# Patient Record
Sex: FEMALE | Race: WHITE | NOT HISPANIC OR LATINO | Employment: FULL TIME | URBAN - METROPOLITAN AREA
[De-identification: names, ages, dates, MRNs, and addresses within clinical notes are randomized per-mention and may not be internally consistent; named-entity substitution may affect disease eponyms.]

---

## 2017-01-17 ENCOUNTER — TELEPHONE (OUTPATIENT)
Dept: FAMILY MEDICINE | Facility: CLINIC | Age: 52
End: 2017-01-17

## 2017-01-17 NOTE — TELEPHONE ENCOUNTER
"Genesis Adhikari is a 51 year old female who calls with stomach pain.    NURSING ASSESSMENT:  Description:  Dull feeling in the right lower quadrant. It doesn't even feel like a pain. \"It surprises me when it happens\". Intermittent about 5-6 times per day. LBM was yesterday morning - normal per report.  Onset/duration:  5-6 days  Precip. factors: Unknown  Associated symptoms: None - denies fever, decreased appetite, nausea, pain, dizziness  Improves/worsens symptoms: Nothing tried. She knows it happens when she sits down, but really hasn't tracked what she is doing before she notices it  Allergies:   Allergies   Allergen Reactions     No Known Drug Allergies      RECOMMENDED DISPOSITION: Patient would like to wait until Monday to be seen. Declines earlier appointment. Will let us know if further symptoms arise.   Will comply with recommendation: YES   If further questions/concerns or if Sx do not improve, worsen or new Sx develop, call your PCP or [facility] as soon as possible.    NOTES:  Disposition was determined by the first positive assessment question, therefore all previous assessment questions were negative.     Guideline used:  Telephone Triage Protocols for Nurses, Fourth Edition, Che Rodrigues, RN, BSN      "

## 2017-01-17 NOTE — TELEPHONE ENCOUNTER
[facility] phone call message- patient reporting a symptom:    Symptom or request: stomach pain. Patient said it was dull and not severe    Duration (how long have symptoms been present): about 5 days  Have you been treated for this before? No    Additional comments: patient did not feel like she needed to speak to someone urgently but she would like to discuss this with someone today. She also made a appt for Monday    Call taken on 1/17/2017 at 1:53 PM by Herminia Baker

## 2017-01-19 ENCOUNTER — TELEPHONE (OUTPATIENT)
Dept: FAMILY MEDICINE | Facility: CLINIC | Age: 52
End: 2017-01-19

## 2017-01-23 ENCOUNTER — OFFICE VISIT (OUTPATIENT)
Dept: FAMILY MEDICINE | Facility: CLINIC | Age: 52
End: 2017-01-23
Payer: COMMERCIAL

## 2017-01-23 VITALS
HEART RATE: 72 BPM | WEIGHT: 204.2 LBS | BODY MASS INDEX: 34.02 KG/M2 | SYSTOLIC BLOOD PRESSURE: 118 MMHG | RESPIRATION RATE: 16 BRPM | DIASTOLIC BLOOD PRESSURE: 72 MMHG | HEIGHT: 65 IN | TEMPERATURE: 97.7 F

## 2017-01-23 DIAGNOSIS — R10.31 RLQ ABDOMINAL PAIN: Primary | ICD-10-CM

## 2017-01-23 DIAGNOSIS — F17.200 TOBACCO USE DISORDER: ICD-10-CM

## 2017-01-23 PROCEDURE — 99214 OFFICE O/P EST MOD 30 MIN: CPT | Performed by: NURSE PRACTITIONER

## 2017-01-23 ASSESSMENT — ANXIETY QUESTIONNAIRES
7. FEELING AFRAID AS IF SOMETHING AWFUL MIGHT HAPPEN: NOT AT ALL
2. NOT BEING ABLE TO STOP OR CONTROL WORRYING: NOT AT ALL
GAD7 TOTAL SCORE: 0
6. BECOMING EASILY ANNOYED OR IRRITABLE: NOT AT ALL
1. FEELING NERVOUS, ANXIOUS, OR ON EDGE: NOT AT ALL
5. BEING SO RESTLESS THAT IT IS HARD TO SIT STILL: NOT AT ALL
3. WORRYING TOO MUCH ABOUT DIFFERENT THINGS: NOT AT ALL

## 2017-01-23 ASSESSMENT — PATIENT HEALTH QUESTIONNAIRE - PHQ9: 5. POOR APPETITE OR OVEREATING: NOT AT ALL

## 2017-01-23 ASSESSMENT — PAIN SCALES - GENERAL: PAINLEVEL: MILD PAIN (2)

## 2017-01-23 NOTE — NURSING NOTE
"Chief Complaint   Patient presents with     Abdominal Pain     Panel Management     MyChart, Flu shot, Colon cancer screen, Tobacco cessation, phq, nyla       Initial /72 mmHg  Pulse 72  Temp(Src) 97.7  F (36.5  C) (Temporal)  Resp 16  Ht 5' 5\" (1.651 m)  Wt 204 lb 3.2 oz (92.625 kg)  BMI 33.98 kg/m2 Estimated body mass index is 33.98 kg/(m^2) as calculated from the following:    Height as of this encounter: 5' 5\" (1.651 m).    Weight as of this encounter: 204 lb 3.2 oz (92.625 kg).  BP completed using cuff size: yahir Clarke, LORIN    "

## 2017-01-24 ASSESSMENT — PATIENT HEALTH QUESTIONNAIRE - PHQ9: SUM OF ALL RESPONSES TO PHQ QUESTIONS 1-9: 0

## 2017-01-24 ASSESSMENT — ANXIETY QUESTIONNAIRES: GAD7 TOTAL SCORE: 0

## 2017-02-01 ENCOUNTER — RADIANT APPOINTMENT (OUTPATIENT)
Dept: ULTRASOUND IMAGING | Facility: CLINIC | Age: 52
End: 2017-02-01
Attending: NURSE PRACTITIONER
Payer: COMMERCIAL

## 2017-02-01 ENCOUNTER — TELEPHONE (OUTPATIENT)
Dept: FAMILY MEDICINE | Facility: OTHER | Age: 52
End: 2017-02-01

## 2017-02-01 DIAGNOSIS — N85.8 CYST OF UTERUS: ICD-10-CM

## 2017-02-01 DIAGNOSIS — R10.2 PELVIC PAIN IN FEMALE: Primary | ICD-10-CM

## 2017-02-01 DIAGNOSIS — N85.2 UTERINE ENLARGEMENT: ICD-10-CM

## 2017-02-01 DIAGNOSIS — R10.31 RLQ ABDOMINAL PAIN: ICD-10-CM

## 2017-02-01 PROCEDURE — 76856 US EXAM PELVIC COMPLETE: CPT | Performed by: RADIOLOGY

## 2017-02-01 PROCEDURE — 76830 TRANSVAGINAL US NON-OB: CPT | Performed by: RADIOLOGY

## 2017-02-01 NOTE — TELEPHONE ENCOUNTER
Patient notified. Scheduled 2/10 @1049 with Dr. Ford in [place]. Patient would like a call from  to discuss further details upon her return Friday. Writer tried to discuss with patient, but has more questions for .     Joana Wilburn RN, BSN

## 2017-02-01 NOTE — TELEPHONE ENCOUNTER
Call pt. Let her know I am out. Needs to follow-up with Gynecology for uterine biopsy, needs ibuprofen 800 mg hour prior to exam. This is necessary as her uterus is a bit thickened, and should not be at age 51. Second, has multiple tiny cysts on uterus, these look benign on imaging, and may not need any intervention, but needs endometrial biopsy likely. Will consult with GYN at that time about further follow-up. Ovaries are normal.   Joana Taylor

## 2017-02-03 NOTE — TELEPHONE ENCOUNTER
Called pt- discussed results. Not sure if EUBx is needed will defer to visit as is not having bleeding. Joana Taylor

## 2017-02-10 ENCOUNTER — OFFICE VISIT (OUTPATIENT)
Dept: OBGYN | Facility: CLINIC | Age: 52
End: 2017-02-10
Payer: COMMERCIAL

## 2017-02-10 VITALS
HEART RATE: 72 BPM | SYSTOLIC BLOOD PRESSURE: 118 MMHG | WEIGHT: 199 LBS | BODY MASS INDEX: 33.12 KG/M2 | DIASTOLIC BLOOD PRESSURE: 80 MMHG

## 2017-02-10 DIAGNOSIS — R93.89 THICKENED ENDOMETRIUM: Primary | ICD-10-CM

## 2017-02-10 DIAGNOSIS — Z98.890 S/P ENDOMETRIAL ABLATION: ICD-10-CM

## 2017-02-10 PROCEDURE — 99204 OFFICE O/P NEW MOD 45 MIN: CPT | Mod: 25 | Performed by: OBSTETRICS & GYNECOLOGY

## 2017-02-10 PROCEDURE — 58100 BIOPSY OF UTERUS LINING: CPT | Performed by: OBSTETRICS & GYNECOLOGY

## 2017-02-10 PROCEDURE — 88305 TISSUE EXAM BY PATHOLOGIST: CPT | Performed by: OBSTETRICS & GYNECOLOGY

## 2017-02-10 ASSESSMENT — PAIN SCALES - GENERAL: PAINLEVEL: NO PAIN (0)

## 2017-02-10 NOTE — PROGRESS NOTES
"Chief Complaint   Patient presents with     Procedure     Endometrial biopsy/  No UPT/  consent       Initial There were no vitals taken for this visit. Estimated body mass index is 33.98 kg/(m^2) as calculated from the following:    Height as of 1/23/17: 1.651 m (5' 5\").    Weight as of 1/23/17: 92.625 kg (204 lb 3.2 oz).  Medication Reconciliation: complete  "

## 2017-02-10 NOTE — PROGRESS NOTES
Subjective  51 year old non-pregnant female presents today for an endometrial biopsy referred by Joana Taylor for a thickened endometrial lining and endometrial cysts.  Patient states 2 weeks ago she was having right sided lower quadrant pain.  The pain was dull and lasted a week.  No pain now.  Patient had an ultrasound done which showed numerous endometrial cysts and a thickened endometrium.  Patient had an endometrial ablation in .  She has not had any vaginal bleeding since then.  No problems urinating.  Normal bowel movements.  Patient is sexually active.  No dyspareunia.  4 NSVDs.  Patient had menopausal symptoms a year ago including hot flashes, night sweats, and some vaginal dryness.  No more signs and symptoms of these now.  Patient's maternal mother with breast cancer.  No other female cancers that she knows of.  Patient smokes a pack a day.  We discussed doing an endometrial biopsy based on her ultrasound findings and she is in agreement.         ROS: 10 point ROS neg other than the symptoms noted above in the HPI.  Past Medical History   Diagnosis Date     NO ACTIVE PROBLEMS      Past Surgical History   Procedure Laterality Date     C ligate fallopian tube,postpartum  2002     Lap band  3/23/13     Hysteroscopy  3/21/11     As ablation, endometrial, thermal, w/o hysteroscopic guidance  3/21/11     uterine ablation     Hc tooth extraction w/forcep       Family History   Problem Relation Age of Onset     CANCER Father      liver and stomach     HEART DISEASE Maternal Grandfather       of heart problems     CANCER Maternal Grandmother      breast cancer     DIABETES No family hx of      Hypertension No family hx of      Hyperlipidemia No family hx of      Colon Cancer No family hx of      Anxiety Disorder No family hx of      Substance Abuse No family hx of      Asthma No family hx of      Genetic Disorder No family hx of      OSTEOPOROSIS No family hx of      Anesthesia Reaction No family hx of       MENTAL ILLNESS No family hx of      Depression No family hx of      Other Cancer No family hx of      Prostate Cancer No family hx of      Breast Cancer No family hx of      CEREBROVASCULAR DISEASE No family hx of      Coronary Artery Disease No family hx of      Social History   Substance Use Topics     Smoking status: Current Every Day Smoker -- 0.50 packs/day for 25 years     Types: Cigarettes     Smokeless tobacco: Never Used     Alcohol Use: 6.0 oz/week      Comment: 4 beers daily         Objective  Vitals: /80 mmHg  Pulse 72  Wt 90.266 kg (199 lb)  BMI= Body mass index is 33.12 kg/(m^2).    General appearance=mood is stable, no deformities noted  Abd=soft, Nontender/nondistended, +bowel sounds x4, no masses, no signs of hernias, no evidence of hepatosplenomegaly  PELVIC:    External genitalia: normal without lesions or masses  Urethral meatus: no lesions or prolapse noted, normal size  Urethra: mo masses, non tender  Bladder: non tender, no fullness  Vagina: normal mucosa and rugae, no discharge.  Cervix: normal without lesion, no cervical motion tenderness, healthy, multiparous  Uterus: small, mobile, nontender.  Adnexa: non tender, without masses  Rectal: deffered  Ext=no clubbing or cyanosis, no swelling    Pelvic ultrasound=2/17/17:  FINDINGS: Transabdominal and transvaginal imaging. The uterus measures  11.2 x 5.1 x 7.7 cm. 2 cm anechoic structure in the high right uterus  which likely extends from the endometrium. There are other small  endometrial cysts. The endometrial stripe measures 10 mm. There is no  intrauterine mass. There is no free fluid in the pelvis.     The right ovary measures 2.5 x 2.5 x 2.3 cm. The left ovary measures  2.9 x 3.2 x 2.6 cm. There is no adnexal mass. There is normal blood  flow to the ovaries.                                                                       IMPRESSION: Numerous endometrial cysts which can be seen post  ablation, but can also be seen with  endometrial hyperplasia. The  endometrium is also wide for a postmenopausal patient.    Procedure:  Endometrial biopsy    Indication: Thickened endometrial lining, endometrial cysts    Discussed risk of bleeding, infection, uterine perforation, cramping pain.  Pt agreed to proceed with procedure after all questions answered.    Speculum placed and cervix visualized.  Cervix cleansed with betadine x 3.  Tenaculum placed on anterior lip of the cervix.  Endometrial biopsy pipelle passed through cervix and uterus sounded to 7.5 cm.  Biopsy specimen collected with two passes with return of moderate amount of pink tissue.  Specimen placed in a labeled container and set aside to be sent to pathology.  Tenaculum removed from the cervix and sites hemostatic with Silver Nitrate.  No bleeding noted from cervical os.     Patient tolerated the procedure well.  There were no apparent complications and bleeding was minimal.    She is instructed to use no tampons and have no intercourse for the next 5 days.        Assessment  1.)  RLQ pain=resolved  2.)  Numerous endometrial cysts seen on ultrasound  3.)  Thickened endometrial lining      Plan  1.)  Endometrial biopsy      35 minutes was spent face to face with the patient today discussing her history, diagnosis, and follow-up plan as noted above.  Over 50% of the visit was spent in counseling and coordination of care.    Total Visit Time: 45 minutes including counseling and physical exam not including time spent on the procedure.    Socorro Ford

## 2017-02-15 LAB — COPATH REPORT: NORMAL

## 2017-02-17 ENCOUNTER — TELEPHONE (OUTPATIENT)
Dept: OBGYN | Facility: OTHER | Age: 52
End: 2017-02-17

## 2017-02-17 NOTE — TELEPHONE ENCOUNTER
[facility] phone call message- patient requests results:    Name of test or procedure: uterine bx results   Date of test of procedure: 2/10/17  Location of the test or procedure: [facility]  OK to leave the result message on voice mail or with a family member? YES    Additional comments: please call    Call taken on 2/17/2017 at 8:52 AM by Roselia Momin

## 2017-02-23 ENCOUNTER — TELEPHONE (OUTPATIENT)
Dept: OBGYN | Facility: OTHER | Age: 52
End: 2017-02-23

## 2017-02-23 NOTE — TELEPHONE ENCOUNTER
Called and spoke with patient regarding message below.  Informed patient to call back with any other concerns or questions.  Patient verbalized understanding.  Aziza Case [facility]

## 2017-02-23 NOTE — TELEPHONE ENCOUNTER
Please let patient know that there are no restrictions for lifting after her procedure but I do not recommend heavy lifting the day after.  Only restriction is pelvic rest, so no intercourse or tampons for 4 weeks.    Socorro Ford

## 2017-02-23 NOTE — TELEPHONE ENCOUNTER
Surgery Scheduled    Date of Surgery 03/16/17 Time of Surgery 11:00am  Procedure: Hysteroscopy Dilation & Curettage  Hospital/Surgical Facility: [facility]  Surgeon: Dr Ford  Type of Anesthesia Anticipated: MAC  Pre-Op: 03/14/17 with Iona Lozada   Post-Op: 04/11/17 with Dr Ford  Pre-Certification - to be completed  Consent Signed -to be completed  Hospital Stay -same day procedure    Surgery Packet (and/or) Colonscopy Prep (was given/or mailed) to patient. Patient was also instructed to arrive 1 hour(s) prior to surgery.  Patient understood and agrees to the plan.      Mihaela Hatch  Specialty    __________________________________________  Surgery Pre-Certification    Medical Record Number: [id number]  Genesis Adhikari  YOB: 1965   Phone: [phone] (home) none (work)  Primary Provider: Joana Taylor      Surgeon: Dr Ford  Surgical Procedure: Hysteroscopy Dilation & Curettage  ICD-9 Coded: R93.8  Date of Surgery: 03/16/17  Consent signed? No    Date signed:   Hospital: [facility]  Outpatient    Requestor:  Aranza Hatch     Location:  [facility]

## 2017-02-23 NOTE — TELEPHONE ENCOUNTER
Genesis is scheduled to have HD&C with Dr Ford on 03/16. She's wondering if she'll have any lifting restrictions after the procedure?

## 2017-03-08 NOTE — PROGRESS NOTES
[facility] [place]  [address], [address]  [place] [address]  [phone]  Dept: [phone]    PRE-OP EVALUATION:  Today's date: 3/14/2017    Genesis Adhikari (: 1965) presents for pre-operative evaluation assessment as requested by Dr. Ford.  She requires evaluation and anesthesia risk assessment prior to undergoing surgery/procedure for treatment of D & C .  Proposed procedure: Hysteroscopy, D&C    Date of Surgery/ Procedure: 3/16/2017  Time of Surgery/ Procedure: 11:00am  Hospital/Surgical Facility: [facility]  Fax number for surgical facility:   Primary Physician: Joana Taylor  Type of Anesthesia Anticipated: General    Patient has a Health Care Directive or Living Will:  NO    1. NO - Do you have a history of heart attack, stroke, stent, bypass or surgery on an artery in the head, neck, heart or legs?  2. NO - Do you ever have any pain or discomfort in your chest?  3. NO - Do you have a history of  Heart Failure?  4. NO - Are you troubled by shortness of breath when: walking on the level, up a slight hill or at night?  5. NO - Do you currently have a cold, bronchitis or other respiratory infection?  6. NO - Do you have a cough, shortness of breath or wheezing?  7. NO - Do you sometimes get pains in the calves of your legs when you walk?  8. NO - Do you or anyone in your family have previous history of blood clots?  9. NO - Do you or does anyone in your family have a serious bleeding problem such as prolonged bleeding following surgeries or cuts?  10. NO - Have you ever had problems with anemia or been told to take iron pills?  11. NO - Have you had any abnormal blood loss such as black, tarry or bloody stools, or abnormal vaginal bleeding?  12. NO - Have you ever had a blood transfusion?  13. NO - Have you or any of your relatives ever had problems with anesthesia?  14. NO - Do you have sleep apnea, excessive snoring or daytime drowsiness?  15. NO - Do you have any prosthetic  heart valves?  16. NO - Do you have prosthetic joints?  17. NO - Is there any chance that you may be pregnant?      HPI:                                                      Brief HPI related to upcoming procedure: Patient is having D&C due to thickened endometrium       See problem list for active medical problems.  Problems all longstanding and stable, except as noted/documented.  See ROS for pertinent symptoms related to these conditions.                                                                                                  .    MEDICAL HISTORY:                                                      Patient Active Problem List    Diagnosis Date Noted     Thickened endometrium 02/10/2017     Priority: Medium     S/P endometrial ablation 02/10/2017     Priority: Medium     Uterine enlargement 02/01/2017     Priority: Medium     Cyst of uterus 02/01/2017     Priority: Medium     Alcohol use 06/24/2016     Priority: Medium     Greater than 4 beers/day       Obesity 04/07/2015     Priority: Medium     H/O laparoscopic adjustable gastric banding 04/07/2015     Priority: Medium     Seborrheic dermatitis of scalp 04/07/2015     Priority: Medium     Polyp of corpus uteri 03/23/2011     Priority: Medium     Major depressive disorder, recurrent episode, mild (H) 03/16/2011     Priority: Medium     CARDIOVASCULAR SCREENING; LDL GOAL LESS THAN 160 10/31/2010     Priority: Medium     Other, mixed, or unspecified nondependent drug abuse, in remission 03/26/2006     Priority: Medium     Depressive disorder, not elsewhere classified 06/11/2002     Priority: Medium     Vaginitis and vulvovaginitis 01/18/2002     Priority: Medium     Problem list name updated by automated process. Provider to review        Past Medical History   Diagnosis Date     NO ACTIVE PROBLEMS      Past Surgical History   Procedure Laterality Date     C ligate fallopian tube,postpartum  08/07/2002     Lap band  3/23/13     Hysteroscopy  3/21/11     As  ablation, endometrial, thermal, w/o hysteroscopic guidance  3/21/11     uterine ablation     Hc tooth extraction w/forcep       Current Outpatient Prescriptions   Medication Sig Dispense Refill     varenicline (CHANTIX STARTING MONTH PAK) 0.5 MG X 11 & 1 MG X 42 tablet Take 0.5 mg tab daily for 3 days, then 0.5 mg tab twice daily for 4 days, then 1 mg twice daily. 53 tablet 1     citalopram (CELEXA) 20 MG tablet Take 1 tablet (20 mg) by mouth daily 90 tablet 3     clobetasol propionate 0.05 % SHAM Apply topically daily as needed Apply daily in the shower for 1 week, then once weekly as needed.  Leave in place for 15 minutes then add water, lather and rinse thoroughly. 1 Bottle 5     ketoconazole (NIZORAL) 2 % cream Apply topically daily Use twice daily on the external ear on non steroid days (Patient not taking: Reported on 3/14/2017) 30 g 11     triamcinolone (KENALOG) 0.1 % cream For external ears use twice daily for 2 weeks, then twice daily weekends only. (Patient not taking: Reported on 3/14/2017) 45 g 1     fluocinonide (LIDEX) 0.05 % ointment Apply twice daily for 2 weeks behind the ears. (Patient not taking: Reported on 3/14/2017) 30 g 0     OTC products: None, except as noted above    Allergies   Allergen Reactions     No Known Drug Allergies       Latex Allergy: NO    Social History   Substance Use Topics     Smoking status: Former Smoker     Packs/day: 0.50     Years: 25.00     Types: Cigarettes     Smokeless tobacco: Never Used      Comment: quit 1 week ago     Alcohol use 6.0 oz/week      Comment: 4 beers daily     History   Drug Use No       REVIEW OF SYSTEMS:                                                    C: NEGATIVE for fever, chills, change in weight  I: NEGATIVE for worrisome rashes, moles or lesions  E: NEGATIVE for vision changes or irritation  E/M: NEGATIVE for ear, mouth and throat problems  R: NEGATIVE for significant cough or SOB  CV: NEGATIVE for chest pain, palpitations or peripheral  "edema  GI: NEGATIVE for nausea, abdominal pain, heartburn, or change in bowel habits  : NEGATIVE for frequency, dysuria, or hematuria  M: NEGATIVE for significant arthralgias or myalgia  N: NEGATIVE for weakness, dizziness or paresthesias  E: NEGATIVE for temperature intolerance, skin/hair changes  H: NEGATIVE for bleeding problems  P: NEGATIVE for changes in mood or affect    EXAM:                                                    /60  Pulse 72  Temp 97.8  F (36.6  C) (Temporal)  Resp 8  Ht 5' 4.57\" (1.64 m)  Wt 209 lb (94.8 kg)  BMI 35.25 kg/m2    GENERAL APPEARANCE: alert, no distress and over weight     EYES: EOMI, PERRL     HENT: ear canals and TM's normal and nose and mouth without ulcers or lesions     NECK: no adenopathy, no asymmetry, masses, or scars and thyroid normal to palpation     RESP: lungs clear to auscultation - no rales, rhonchi or wheezes     CV: regular rates and rhythm     ABDOMEN:  soft, nontender, no HSM or masses and bowel sounds normal     MS: extremities normal- no gross deformities noted, no evidence of inflammation in joints, FROM in all extremities.     SKIN: no suspicious lesions or rashes     PSYCH: mentation appears normal. and affect normal/bright     LYMPHATICS: normal ant/post cervical, supraclavicular nodes    DIAGNOSTICS:                                                    EKG: Not indicated due to non-vascular surgery and low risk of event (age <65 and without cardiac risk factors)    Recent Labs   Lab Test  04/02/10   0814   HGB  13.0      Labs Pending: CBC and BMP    IMPRESSION:                                                    Reason for surgery/procedure: thickened endometrium/ D&C  Diagnosis/reason for consult: Pre Op    The proposed surgical procedure is considered INTERMEDIATE risk.    REVISED CARDIAC RISK INDEX  The patient has the following serious cardiovascular risks for perioperative complications such as (MI, PE, VFib and 3  AV Block):  No serious cardiac " risks  INTERPRETATION: 0 risks: Class I (very low risk - 0.4% complication rate)    The patient has the following additional risks for perioperative complications:  No identified additional risks        ICD-10-CM    1. Preop general physical exam Z01.818    2. Thickened endometrium R93.8        RECOMMENDATIONS:                                                      --Patient is to take all scheduled medications on the day of surgery EXCEPT for modifications listed below.    APPROVAL GIVEN to proceed with proposed procedure, without further diagnostic evaluation       Signed Electronically by: Iona Lozada PA-C    Copy of this evaluation report is provided to requesting physician.    [facility] Preop Guidelines

## 2017-03-14 ENCOUNTER — OFFICE VISIT (OUTPATIENT)
Dept: FAMILY MEDICINE | Facility: CLINIC | Age: 52
End: 2017-03-14
Payer: COMMERCIAL

## 2017-03-14 VITALS
WEIGHT: 209 LBS | HEIGHT: 65 IN | DIASTOLIC BLOOD PRESSURE: 60 MMHG | SYSTOLIC BLOOD PRESSURE: 100 MMHG | RESPIRATION RATE: 8 BRPM | HEART RATE: 72 BPM | BODY MASS INDEX: 34.82 KG/M2 | TEMPERATURE: 97.8 F

## 2017-03-14 DIAGNOSIS — R93.89 THICKENED ENDOMETRIUM: ICD-10-CM

## 2017-03-14 DIAGNOSIS — Z01.818 PREOP GENERAL PHYSICAL EXAM: Primary | ICD-10-CM

## 2017-03-14 LAB
ANION GAP SERPL CALCULATED.3IONS-SCNC: 9 MMOL/L (ref 3–14)
BUN SERPL-MCNC: 11 MG/DL (ref 7–30)
CALCIUM SERPL-MCNC: 8.5 MG/DL (ref 8.5–10.1)
CHLORIDE SERPL-SCNC: 108 MMOL/L (ref 94–109)
CO2 SERPL-SCNC: 25 MMOL/L (ref 20–32)
CREAT SERPL-MCNC: 0.63 MG/DL (ref 0.52–1.04)
ERYTHROCYTE [DISTWIDTH] IN BLOOD BY AUTOMATED COUNT: 12.7 % (ref 10–15)
GFR SERPL CREATININE-BSD FRML MDRD: NORMAL ML/MIN/1.7M2
GLUCOSE SERPL-MCNC: 93 MG/DL (ref 70–99)
HCT VFR BLD AUTO: 37.5 % (ref 35–47)
HGB BLD-MCNC: 12.7 G/DL (ref 11.7–15.7)
MCH RBC QN AUTO: 31.3 PG (ref 26.5–33)
MCHC RBC AUTO-ENTMCNC: 33.9 G/DL (ref 31.5–36.5)
MCV RBC AUTO: 92 FL (ref 78–100)
PLATELET # BLD AUTO: 159 10E9/L (ref 150–450)
POTASSIUM SERPL-SCNC: 3.7 MMOL/L (ref 3.4–5.3)
RBC # BLD AUTO: 4.06 10E12/L (ref 3.8–5.2)
SODIUM SERPL-SCNC: 142 MMOL/L (ref 133–144)
WBC # BLD AUTO: 5.7 10E9/L (ref 4–11)

## 2017-03-14 PROCEDURE — 36415 COLL VENOUS BLD VENIPUNCTURE: CPT | Performed by: PHYSICIAN ASSISTANT

## 2017-03-14 PROCEDURE — 99214 OFFICE O/P EST MOD 30 MIN: CPT | Performed by: PHYSICIAN ASSISTANT

## 2017-03-14 PROCEDURE — 80048 BASIC METABOLIC PNL TOTAL CA: CPT | Performed by: PHYSICIAN ASSISTANT

## 2017-03-14 PROCEDURE — 85027 COMPLETE CBC AUTOMATED: CPT | Performed by: PHYSICIAN ASSISTANT

## 2017-03-14 ASSESSMENT — PAIN SCALES - GENERAL: PAINLEVEL: NO PAIN (0)

## 2017-03-14 NOTE — MR AVS SNAPSHOT
After Visit Summary   3/14/2017    Genesis Adhikari    MRN: [id number]           Patient Information     Date Of Birth          1965        Visit Information        Provider Department      3/14/2017 2:40 PM Iona Lozada PA-C [facility]        Today's Diagnoses     Preop general physical exam    -  1    Thickened endometrium          Care Instructions      Before Your Surgery      Call your surgeon if there is any change in your health. This includes signs of a cold or flu (such as a sore throat, runny nose, cough, rash or fever).    Do not smoke, drink alcohol or take over the counter medicine (unless your surgeon or primary care doctor tells you to) for the 24 hours before and after surgery.    If you take prescribed drugs: Follow your doctor s orders about which medicines to take and which to stop until after surgery.    Eating and drinking prior to surgery: follow the instructions from your surgeon    Take a shower or bath the night before surgery. Use the soap your surgeon gave you to gently clean your skin. If you do not have soap from your surgeon, use your regular soap. Do not shave or scrub the surgery site.  Wear clean pajamas and have clean sheets on your bed.         Follow-ups after your visit        Your next 10 appointments already scheduled     Mar 16, 2017   Procedure with Socorro Ford DO   [facility] ([place])    [address] [place] [id number] [phone]            Apr 11, 2017  3:45 PM CDT   SHORT with Socorro Ford DO   [facility]    [address] [place] [id number] [phone]              Who to contact     If you have questions or need follow up information about today's clinic visit or your schedule please contact [facility] directly at [phone].  Normal or non-critical lab and imaging results will be communicated to you by  "MyChart, letter or phone within 4 business days after the clinic has received the results. If you do not hear from us within 7 days, please contact the clinic through [employer] or phone. If you have a critical or abnormal lab result, we will notify you by phone as soon as possible.  Submit refill requests through "[employer] or call your pharmacy and they will forward the refill request to us. Please allow 3 business days for your refill to be completed.          Additional Information About Your Visit        [employer] Information     "[employer] lets you send messages to your doctor, view your test results, renew your prescriptions, schedule appointments and more. To sign up, go to [email or web address] . Click on \"Log in\" on the left side of the screen, which will take you to the Welcome page. Then click on \"Sign up Now\" on the right side of the page.     You will be asked to enter the access code listed below, as well as some personal information. Please follow the directions to create your username and password.     Your access code is: [email or web address]  Expires: 2017  3:03 PM     Your access code will  in 90 days. If you need help or a new code, please call your [place] clinic or [phone].        Care EveryWhere ID     This is your Care EveryWhere ID. This could be used by other organizations to access your [place] medical records  [id number]        Your Vitals Were     Pulse Temperature Respirations Height BMI (Body Mass Index)       72 97.8  F (36.6  C) (Temporal) 8 5' 4.57\" (1.64 m) 35.25 kg/m2        Blood Pressure from Last 3 Encounters:   17 100/60   02/10/17 118/80   17 118/72    Weight from Last 3 Encounters:   17 209 lb (94.8 kg)   02/10/17 199 lb (90.3 kg)   17 204 lb 3.2 oz (92.6 kg)              We Performed the Following     Basic metabolic panel     CBC with platelets        Primary Care Provider Office Phone # Fax #    AMADOU Smith [phone] "       [facility] [address] [facility] [address]        Thank you!     Thank you for choosing [facility]  for your care. Our goal is always to provide you with excellent care. Hearing back from our patients is one way we can continue to improve our services. Please take a few minutes to complete the written survey that you may receive in the mail after your visit with us. Thank you!             Your Updated Medication List - Protect others around you: Learn how to safely use, store and throw away your medicines at www.disposemymeds.org.          This list is accurate as of: 3/14/17  3:03 PM.  Always use your most recent med list.                   Brand Name Dispense Instructions for use    citalopram 20 MG tablet    celeXA    90 tablet    Take 1 tablet (20 mg) by mouth daily       clobetasol propionate 0.05 % Sham     1 Bottle    Apply topically daily as needed Apply daily in the shower for 1 week, then once weekly as needed.  Leave in place for 15 minutes then add water, lather and rinse thoroughly.       fluocinonide 0.05 % ointment    LIDEX    30 g    Apply twice daily for 2 weeks behind the ears.       ketoconazole 2 % cream    NIZORAL    30 g    Apply topically daily Use twice daily on the external ear on non steroid days       triamcinolone 0.1 % cream    KENALOG    45 g    For external ears use twice daily for 2 weeks, then twice daily weekends only.       varenicline 0.5 MG X 11 & 1 MG X 42 tablet    CHANTIX STARTING MONTH KYLER    53 tablet    Take 0.5 mg tab daily for 3 days, then 0.5 mg tab twice daily for 4 days, then 1 mg twice daily.

## 2017-03-14 NOTE — NURSING NOTE
"Chief Complaint   Patient presents with     Pre-Op Exam     Panel Management     MyChart, Colonoscopy/FIT, Tobacco Cessation, PHQ-9/CHELSY       Initial /60  Pulse 72  Temp 97.8  F (36.6  C) (Temporal)  Resp 8  Ht 5' 4.57\" (1.64 m)  Wt 209 lb (94.8 kg)  BMI 35.25 kg/m2 Estimated body mass index is 35.25 kg/(m^2) as calculated from the following:    Height as of this encounter: 5' 4.57\" (1.64 m).    Weight as of this encounter: 209 lb (94.8 kg).  Medication Reconciliation: complete     Lhu Cui CMA  "

## 2017-03-16 ENCOUNTER — ANESTHESIA EVENT (OUTPATIENT)
Dept: SURGERY | Facility: AMBULATORY SURGERY CENTER | Age: 52
End: 2017-03-16

## 2017-03-16 ENCOUNTER — HOSPITAL ENCOUNTER (OUTPATIENT)
Facility: AMBULATORY SURGERY CENTER | Age: 52
Discharge: HOME | End: 2017-03-16
Attending: OBSTETRICS & GYNECOLOGY | Admitting: OBSTETRICS & GYNECOLOGY
Payer: COMMERCIAL

## 2017-03-16 ENCOUNTER — ANESTHESIA (OUTPATIENT)
Dept: SURGERY | Facility: AMBULATORY SURGERY CENTER | Age: 52
End: 2017-03-16

## 2017-03-16 VITALS
RESPIRATION RATE: 16 BRPM | OXYGEN SATURATION: 94 % | DIASTOLIC BLOOD PRESSURE: 56 MMHG | SYSTOLIC BLOOD PRESSURE: 101 MMHG | TEMPERATURE: 97.5 F

## 2017-03-16 DIAGNOSIS — G89.18 POST-OP PAIN: Primary | ICD-10-CM

## 2017-03-16 LAB
BETA HCG QUAL IFA URINE: NEGATIVE
ERYTHROCYTE [DISTWIDTH] IN BLOOD BY AUTOMATED COUNT: 13.1 % (ref 10–15)
HCT VFR BLD AUTO: 38.8 % (ref 35–47)
HGB BLD-MCNC: 13.2 G/DL (ref 11.7–15.7)
MCH RBC QN AUTO: 31.1 PG (ref 26.5–33)
MCHC RBC AUTO-ENTMCNC: 34 G/DL (ref 31.5–36.5)
MCV RBC AUTO: 92 FL (ref 78–100)
PLATELET # BLD AUTO: 145 10E9/L (ref 150–450)
RBC # BLD AUTO: 4.24 10E12/L (ref 3.8–5.2)
WBC # BLD AUTO: 5.1 10E9/L (ref 4–11)

## 2017-03-16 PROCEDURE — 58558 HYSTEROSCOPY BIOPSY: CPT | Performed by: OBSTETRICS & GYNECOLOGY

## 2017-03-16 PROCEDURE — 85027 COMPLETE CBC AUTOMATED: CPT | Performed by: OBSTETRICS & GYNECOLOGY

## 2017-03-16 PROCEDURE — 36415 COLL VENOUS BLD VENIPUNCTURE: CPT | Performed by: OBSTETRICS & GYNECOLOGY

## 2017-03-16 PROCEDURE — G8918 PT W/O PREOP ORDER IV AB PRO: HCPCS

## 2017-03-16 PROCEDURE — 58558 HYSTEROSCOPY BIOPSY: CPT

## 2017-03-16 PROCEDURE — G8907 PT DOC NO EVENTS ON DISCHARG: HCPCS

## 2017-03-16 PROCEDURE — 88305 TISSUE EXAM BY PATHOLOGIST: CPT | Performed by: OBSTETRICS & GYNECOLOGY

## 2017-03-16 PROCEDURE — 84703 CHORIONIC GONADOTROPIN ASSAY: CPT | Performed by: ANESTHESIOLOGY

## 2017-03-16 RX ORDER — GABAPENTIN 300 MG/1
300 CAPSULE ORAL ONCE
Status: COMPLETED | OUTPATIENT
Start: 2017-03-16 | End: 2017-03-16

## 2017-03-16 RX ORDER — GLYCOPYRROLATE 0.2 MG/ML
INJECTION, SOLUTION INTRAMUSCULAR; INTRAVENOUS PRN
Status: DISCONTINUED | OUTPATIENT
Start: 2017-03-16 | End: 2017-03-16

## 2017-03-16 RX ORDER — HYDROCODONE BITARTRATE AND ACETAMINOPHEN 5; 325 MG/1; MG/1
1-2 TABLET ORAL
Status: CANCELLED | OUTPATIENT
Start: 2017-03-16

## 2017-03-16 RX ORDER — FENTANYL CITRATE 50 UG/ML
25-50 INJECTION, SOLUTION INTRAMUSCULAR; INTRAVENOUS
Status: CANCELLED | OUTPATIENT
Start: 2017-03-16

## 2017-03-16 RX ORDER — EPHEDRINE SULFATE 50 MG/ML
INJECTION, SOLUTION INTRAMUSCULAR; INTRAVENOUS; SUBCUTANEOUS PRN
Status: DISCONTINUED | OUTPATIENT
Start: 2017-03-16 | End: 2017-03-16

## 2017-03-16 RX ORDER — NALOXONE HYDROCHLORIDE 0.4 MG/ML
.1-.4 INJECTION, SOLUTION INTRAMUSCULAR; INTRAVENOUS; SUBCUTANEOUS
Status: CANCELLED | OUTPATIENT
Start: 2017-03-16 | End: 2017-03-17

## 2017-03-16 RX ORDER — HYDROMORPHONE HYDROCHLORIDE 1 MG/ML
.3-.5 INJECTION, SOLUTION INTRAMUSCULAR; INTRAVENOUS; SUBCUTANEOUS EVERY 10 MIN PRN
Status: CANCELLED | OUTPATIENT
Start: 2017-03-16

## 2017-03-16 RX ORDER — SODIUM CHLORIDE, SODIUM LACTATE, POTASSIUM CHLORIDE, CALCIUM CHLORIDE 600; 310; 30; 20 MG/100ML; MG/100ML; MG/100ML; MG/100ML
INJECTION, SOLUTION INTRAVENOUS CONTINUOUS PRN
Status: DISCONTINUED | OUTPATIENT
Start: 2017-03-16 | End: 2017-03-16

## 2017-03-16 RX ORDER — MEPERIDINE HYDROCHLORIDE 25 MG/ML
12.5 INJECTION INTRAMUSCULAR; INTRAVENOUS; SUBCUTANEOUS
Status: CANCELLED | OUTPATIENT
Start: 2017-03-16

## 2017-03-16 RX ORDER — ALBUTEROL SULFATE 0.83 MG/ML
2.5 SOLUTION RESPIRATORY (INHALATION) EVERY 4 HOURS PRN
Status: CANCELLED | OUTPATIENT
Start: 2017-03-16

## 2017-03-16 RX ORDER — IBUPROFEN 600 MG/1
600 TABLET, FILM COATED ORAL
Status: CANCELLED | OUTPATIENT
Start: 2017-03-16

## 2017-03-16 RX ORDER — FENTANYL CITRATE 50 UG/ML
INJECTION, SOLUTION INTRAMUSCULAR; INTRAVENOUS PRN
Status: DISCONTINUED | OUTPATIENT
Start: 2017-03-16 | End: 2017-03-16

## 2017-03-16 RX ORDER — ONDANSETRON 2 MG/ML
4 INJECTION INTRAMUSCULAR; INTRAVENOUS EVERY 30 MIN PRN
Status: CANCELLED | OUTPATIENT
Start: 2017-03-16

## 2017-03-16 RX ORDER — ONDANSETRON 2 MG/ML
INJECTION INTRAMUSCULAR; INTRAVENOUS PRN
Status: DISCONTINUED | OUTPATIENT
Start: 2017-03-16 | End: 2017-03-16

## 2017-03-16 RX ORDER — KETOROLAC TROMETHAMINE 30 MG/ML
30 INJECTION, SOLUTION INTRAMUSCULAR; INTRAVENOUS EVERY 6 HOURS PRN
Status: CANCELLED | OUTPATIENT
Start: 2017-03-16 | End: 2017-03-21

## 2017-03-16 RX ORDER — IBUPROFEN 600 MG/1
600 TABLET, FILM COATED ORAL EVERY 6 HOURS PRN
Qty: 30 TABLET | Refills: 0 | Status: SHIPPED | OUTPATIENT
Start: 2017-03-16 | End: 2018-06-11

## 2017-03-16 RX ORDER — PROPOFOL 10 MG/ML
INJECTION, EMULSION INTRAVENOUS CONTINUOUS PRN
Status: DISCONTINUED | OUTPATIENT
Start: 2017-03-16 | End: 2017-03-16

## 2017-03-16 RX ORDER — SODIUM CHLORIDE, SODIUM LACTATE, POTASSIUM CHLORIDE, CALCIUM CHLORIDE 600; 310; 30; 20 MG/100ML; MG/100ML; MG/100ML; MG/100ML
INJECTION, SOLUTION INTRAVENOUS CONTINUOUS
Status: CANCELLED | OUTPATIENT
Start: 2017-03-16

## 2017-03-16 RX ORDER — ONDANSETRON 4 MG/1
4 TABLET, ORALLY DISINTEGRATING ORAL EVERY 30 MIN PRN
Status: CANCELLED | OUTPATIENT
Start: 2017-03-16

## 2017-03-16 RX ORDER — HYDROCODONE BITARTRATE AND ACETAMINOPHEN 5; 325 MG/1; MG/1
1-2 TABLET ORAL EVERY 4 HOURS PRN
Qty: 30 TABLET | Refills: 0 | Status: SHIPPED | OUTPATIENT
Start: 2017-03-16 | End: 2017-04-11

## 2017-03-16 RX ORDER — DEXAMETHASONE SODIUM PHOSPHATE 4 MG/ML
4 INJECTION, SOLUTION INTRA-ARTICULAR; INTRALESIONAL; INTRAMUSCULAR; INTRAVENOUS; SOFT TISSUE EVERY 10 MIN PRN
Status: CANCELLED | OUTPATIENT
Start: 2017-03-16

## 2017-03-16 RX ORDER — ACETAMINOPHEN 325 MG/1
975 TABLET ORAL ONCE
Status: COMPLETED | OUTPATIENT
Start: 2017-03-16 | End: 2017-03-16

## 2017-03-16 RX ORDER — PROPOFOL 10 MG/ML
INJECTION, EMULSION INTRAVENOUS PRN
Status: DISCONTINUED | OUTPATIENT
Start: 2017-03-16 | End: 2017-03-16

## 2017-03-16 RX ADMIN — PROPOFOL 50 MG: 10 INJECTION, EMULSION INTRAVENOUS at 11:13

## 2017-03-16 RX ADMIN — FENTANYL CITRATE 100 MCG: 50 INJECTION, SOLUTION INTRAMUSCULAR; INTRAVENOUS at 11:06

## 2017-03-16 RX ADMIN — EPHEDRINE SULFATE 5 MG: 50 INJECTION, SOLUTION INTRAMUSCULAR; INTRAVENOUS; SUBCUTANEOUS at 11:11

## 2017-03-16 RX ADMIN — GLYCOPYRROLATE 0.2 MG: 0.2 INJECTION, SOLUTION INTRAMUSCULAR; INTRAVENOUS at 11:15

## 2017-03-16 RX ADMIN — SODIUM CHLORIDE, SODIUM LACTATE, POTASSIUM CHLORIDE, CALCIUM CHLORIDE: 600; 310; 30; 20 INJECTION, SOLUTION INTRAVENOUS at 11:31

## 2017-03-16 RX ADMIN — GABAPENTIN 300 MG: 300 CAPSULE ORAL at 10:43

## 2017-03-16 RX ADMIN — ACETAMINOPHEN 975 MG: 325 TABLET ORAL at 10:43

## 2017-03-16 RX ADMIN — PROPOFOL 50 MG: 10 INJECTION, EMULSION INTRAVENOUS at 11:37

## 2017-03-16 RX ADMIN — ONDANSETRON 4 MG: 2 INJECTION INTRAMUSCULAR; INTRAVENOUS at 11:06

## 2017-03-16 RX ADMIN — EPHEDRINE SULFATE 10 MG: 50 INJECTION, SOLUTION INTRAMUSCULAR; INTRAVENOUS; SUBCUTANEOUS at 11:09

## 2017-03-16 RX ADMIN — SODIUM CHLORIDE, SODIUM LACTATE, POTASSIUM CHLORIDE, CALCIUM CHLORIDE: 600; 310; 30; 20 INJECTION, SOLUTION INTRAVENOUS at 11:06

## 2017-03-16 RX ADMIN — PROPOFOL 150 MCG/KG/MIN: 10 INJECTION, EMULSION INTRAVENOUS at 11:08

## 2017-03-16 NOTE — INTERVAL H&P NOTE
I discussed risks, benefits, and complications of surgery including but not limited to bleeding, infection, damage to nearby organs including but not limited to bladder, bowel, ureters, nerves, and blood vessels as well as anesthesia risks and uterine perforation.  Injury may result at the time of surgery or in a separate procedure.  We also discussed the possibility of a reoperation if the pathology came back abnormal.  All questions answered, and accepting these risks, the patient elects to proceed with the procedure.        Socorro Ford  Hgb=13.2

## 2017-03-16 NOTE — BRIEF OP NOTE
Operative Note:   Pre-Op Dx: Thickened endometrial lining, endometrial cysts, s/p endometrial ablation  Post-Op Dx: Same   Procedure: Hysteroscopy dilation and curettage  Attending: Dr. Ford  Assist: OR Staff  Anesthesia: MAC   Fluids: 1200cc LR   EBL: Minimal   Urine: Minimal   Complications: None   Speciman: Endometrial curettings   Findings: Anteverted uterus, no obvious uterine cavity seen, significant scarring of cervix  See full dictated operative note   Socorro Ford    Dictation ivarig=[id number]

## 2017-03-16 NOTE — OP NOTE
PROCEDURE/SERVICE DATE:   03/16/2017.      PREOPERATIVE DIAGNOSIS:  Thickened endometrial lining, endometrial cysts, status post endometrial ablation.      POSTOPERATIVE DIAGNOSIS:  Thickened endometrial lining, endometrial cysts, status post endometrial ablation.      PROCEDURE:  Hysteroscopy with D&C.      ATTENDING SURGEON:  Socorro Ford DO.      ASSISTANT:  OR staff.      ANESTHESIA:  MAC.      FLUIDS:  1,200 cc of lactated Ringer's.      ESTIMATED BLOOD LOSS:  Minimal.      URINE OUTPUT:  Minimal.      COMPLICATIONS:  None.      SPECIMEN:  Endometrial curettings.      FINDINGS:     1. Anteverted uterus.     2. No obvious uterine cavity appreciated.   3. Significant scarring of the cervix.      PROCEDURE:  Genesis Adhikari was taken to the operating room, where MAC anesthesia was obtained without difficulty.  She was then prepped and draped in the normal sterile fashion in the dorsal lithotomy position.  At this time, a timeout procedure was performed to confirm the correct patient and procedure, and all were in agreement.  Next, a Red Ron catheter was used to drain the patient's bladder with minimal amount of urine in return.  The catheter was removed.  Next, a Graves speculum was placed in the patient's vagina and the anterior lip of the cervix was grasped with an Allis clamp.  The cervix was gently dilated with a Hegar dilator.  However, due to the patient's history of an endometrial ablation, I planned on dilating the patient's cervix further with hydrodilation from the hysteroscope.  The hysteroscope was primed according to protocol and gently introduced into the cervix.  There was significant scarring of the cervix.  There were false cavities noted from the Hegar dilator.  The hysteroscope was used to hydrodilate the cervix.  I did follow the large tunnel of the cervix, which went on for quite a distance.  However, I did not appreciate any significant uterine cavity.  There was significant scarring  of the cervix and it was difficult to completely hydrodilate the cervix.  The hysteroscope ran into an obliterated tunnel.  The hysteroscope was removed.  The endometrial pipelle was passed with 2 attempts with moderate amount of tissue in return.  Sharp curettage was performed in a circumferential manner until a gritty texture was noted.  The Allis clamp was removed, and everything appeared hemostatic.  Everything was removed from the patient's vagina.  The patient tolerated the procedure well.  Sponge, lap and needle counts were correct x2.  The patient was taken to the Recovery Room in awake and stable condition.        She was given the standard postoperative instructions and instructed to follow up with me in 2 weeks.  She does not have anybody here to speak to and she did not wish for me to call anybody.  Her ride will be here in a few hours.         SUHAS AGUILAR DO             D: 2017 11:40   T: 2017 12:45   MT: FLIP#[id number]      Name:     DORA URENA   MRN:      [id number]        Account:        [id number]   :      1965           Procedure Date: 2017      Document: [id number]

## 2017-03-16 NOTE — IP AVS SNAPSHOT
[facility]    [address] [place] [address]    Phone:  [phone]                                       After Visit Summary   3/16/2017    Genesis Adhikari    MRN: [id number]           After Visit Summary Signature Page     I have received my discharge instructions, and my questions have been answered. I have discussed any challenges I see with this plan with the nurse or doctor.    ..........................................................................................................................................  Patient/Patient Representative Signature      ..........................................................................................................................................  Patient Representative Print Name and Relationship to Patient    ..................................................               ................................................  Date                                            Time    ..........................................................................................................................................  Reviewed by Signature/Title    ...................................................              ..............................................  Date                                                            Time

## 2017-03-16 NOTE — DISCHARGE INSTRUCTIONS
[facility]  Same-Day Surgery   Adult Discharge Orders & Instructions   For 24 hours after surgery  1. Get plenty of rest.  A responsible adult must stay with you for at least 24 hours after you leave the hospital.   2. Do not drive or use heavy equipment.  If you have weakness or tingling, don't drive or use heavy equipment until this feeling goes away.  3. Do not drink alcohol.  4. Avoid strenuous or risky activities.  Ask for help when climbing stairs.   5. You may feel lightheaded.  IF so, sit for a few minutes before standing.  Have someone help you get up.   6. If you have nausea (feel sick to your stomach): Drink only clear liquids such as apple juice, ginger ale, broth or 7-Up.  Rest may also help.  Be sure to drink enough fluids.  Move to a regular diet as you feel able.  7. You may have a slight fever. Call the doctor if your fever is over 100 F (37.7 C) (taken under the tongue) or lasts longer than 24 hours.  8. You may have a dry mouth, a sore throat, muscle aches or trouble sleeping.  These should go away after 24 hours.  9. Do not make important or legal decisions.   Call your doctor for any of the followin.  Signs of infection (fever, growing tenderness at the surgery site, a large amount of drainage or bleeding, severe pain, foul-smelling drainage, redness, swelling).    2. It has been over 8 to 10 hours since surgery and you are still not able to urinate (pass water).    3.  Headache for over 24 hours.    4.  Numbness, tingling or weakness the day after surgery (if you had spinal anesthesia).

## 2017-03-16 NOTE — ANESTHESIA CARE TRANSFER NOTE
Patient: Genesis Adhikari    Procedure(s):  Hysteroscopy, D&C - Wound Class: II-Clean Contaminated    Diagnosis: thickened endometrium s/p endometrial ablation, endometrial cysts  Diagnosis Additional Information: No value filed.    Anesthesia Type:   MAC     Note:  Airway :Room Air  Patient transferred to:Phase II        Vitals: (Last set prior to Anesthesia Care Transfer)    CRNA VITALS  3/16/2017 1112 - 3/16/2017 1146      3/16/2017             Pulse: 78    SpO2: 94 %    Resp Rate (observed): (!)  2                Electronically Signed By: AMADOU Tafoya CRNA  March 16, 2017  11:46 AM

## 2017-03-16 NOTE — ANESTHESIA POSTPROCEDURE EVALUATION
Patient: Genesis Adhikari    Procedure(s):  Hysteroscopy, D&C - Wound Class: II-Clean Contaminated    Diagnosis:thickened endometrium s/p endometrial ablation, endometrial cysts  Diagnosis Additional Information: No value filed.    Anesthesia Type:  MAC    Note:  Anesthesia Post Evaluation    Patient location during evaluation: PACU  Patient participation: Able to fully participate in evaluation  Level of consciousness: awake  Pain management: adequate  Airway patency: patent  Cardiovascular status: acceptable  Respiratory status: acceptable  Hydration status: balanced  PONV: none     Anesthetic complications: None          Last vitals:  Vitals:    03/16/17 1146 03/16/17 1200 03/16/17 1215   BP: 102/48 106/58 101/56   Resp: 16 16 16   Temp: 36.4  C (97.5  F)     SpO2: 93% 92% 94%         Electronically Signed By: Shyam Steiner MD  March 16, 2017  3:14 PM

## 2017-03-16 NOTE — H&P (VIEW-ONLY)
[facility] [place]  [address], [address]  [place] [address]  [phone]  Dept: [phone]    PRE-OP EVALUATION:  Today's date: 3/14/2017    Genesis Adhikari (: 1965) presents for pre-operative evaluation assessment as requested by Dr. Ford.  She requires evaluation and anesthesia risk assessment prior to undergoing surgery/procedure for treatment of D & C .  Proposed procedure: Hysteroscopy, D&C    Date of Surgery/ Procedure: 3/16/2017  Time of Surgery/ Procedure: 11:00am  Hospital/Surgical Facility: [facility]  Fax number for surgical facility:   Primary Physician: Joana Taylor  Type of Anesthesia Anticipated: General    Patient has a Health Care Directive or Living Will:  NO    1. NO - Do you have a history of heart attack, stroke, stent, bypass or surgery on an artery in the head, neck, heart or legs?  2. NO - Do you ever have any pain or discomfort in your chest?  3. NO - Do you have a history of  Heart Failure?  4. NO - Are you troubled by shortness of breath when: walking on the level, up a slight hill or at night?  5. NO - Do you currently have a cold, bronchitis or other respiratory infection?  6. NO - Do you have a cough, shortness of breath or wheezing?  7. NO - Do you sometimes get pains in the calves of your legs when you walk?  8. NO - Do you or anyone in your family have previous history of blood clots?  9. NO - Do you or does anyone in your family have a serious bleeding problem such as prolonged bleeding following surgeries or cuts?  10. NO - Have you ever had problems with anemia or been told to take iron pills?  11. NO - Have you had any abnormal blood loss such as black, tarry or bloody stools, or abnormal vaginal bleeding?  12. NO - Have you ever had a blood transfusion?  13. NO - Have you or any of your relatives ever had problems with anesthesia?  14. NO - Do you have sleep apnea, excessive snoring or daytime drowsiness?  15. NO - Do you have any prosthetic  heart valves?  16. NO - Do you have prosthetic joints?  17. NO - Is there any chance that you may be pregnant?      HPI:                                                      Brief HPI related to upcoming procedure: Patient is having D&C due to thickened endometrium       See problem list for active medical problems.  Problems all longstanding and stable, except as noted/documented.  See ROS for pertinent symptoms related to these conditions.                                                                                                  .    MEDICAL HISTORY:                                                      Patient Active Problem List    Diagnosis Date Noted     Thickened endometrium 02/10/2017     Priority: Medium     S/P endometrial ablation 02/10/2017     Priority: Medium     Uterine enlargement 02/01/2017     Priority: Medium     Cyst of uterus 02/01/2017     Priority: Medium     Alcohol use 06/24/2016     Priority: Medium     Greater than 4 beers/day       Obesity 04/07/2015     Priority: Medium     H/O laparoscopic adjustable gastric banding 04/07/2015     Priority: Medium     Seborrheic dermatitis of scalp 04/07/2015     Priority: Medium     Polyp of corpus uteri 03/23/2011     Priority: Medium     Major depressive disorder, recurrent episode, mild (H) 03/16/2011     Priority: Medium     CARDIOVASCULAR SCREENING; LDL GOAL LESS THAN 160 10/31/2010     Priority: Medium     Other, mixed, or unspecified nondependent drug abuse, in remission 03/26/2006     Priority: Medium     Depressive disorder, not elsewhere classified 06/11/2002     Priority: Medium     Vaginitis and vulvovaginitis 01/18/2002     Priority: Medium     Problem list name updated by automated process. Provider to review        Past Medical History   Diagnosis Date     NO ACTIVE PROBLEMS      Past Surgical History   Procedure Laterality Date     C ligate fallopian tube,postpartum  08/07/2002     Lap band  3/23/13     Hysteroscopy  3/21/11     As  ablation, endometrial, thermal, w/o hysteroscopic guidance  3/21/11     uterine ablation     Hc tooth extraction w/forcep       Current Outpatient Prescriptions   Medication Sig Dispense Refill     varenicline (CHANTIX STARTING MONTH PAK) 0.5 MG X 11 & 1 MG X 42 tablet Take 0.5 mg tab daily for 3 days, then 0.5 mg tab twice daily for 4 days, then 1 mg twice daily. 53 tablet 1     citalopram (CELEXA) 20 MG tablet Take 1 tablet (20 mg) by mouth daily 90 tablet 3     clobetasol propionate 0.05 % SHAM Apply topically daily as needed Apply daily in the shower for 1 week, then once weekly as needed.  Leave in place for 15 minutes then add water, lather and rinse thoroughly. 1 Bottle 5     ketoconazole (NIZORAL) 2 % cream Apply topically daily Use twice daily on the external ear on non steroid days (Patient not taking: Reported on 3/14/2017) 30 g 11     triamcinolone (KENALOG) 0.1 % cream For external ears use twice daily for 2 weeks, then twice daily weekends only. (Patient not taking: Reported on 3/14/2017) 45 g 1     fluocinonide (LIDEX) 0.05 % ointment Apply twice daily for 2 weeks behind the ears. (Patient not taking: Reported on 3/14/2017) 30 g 0     OTC products: None, except as noted above    Allergies   Allergen Reactions     No Known Drug Allergies       Latex Allergy: NO    Social History   Substance Use Topics     Smoking status: Former Smoker     Packs/day: 0.50     Years: 25.00     Types: Cigarettes     Smokeless tobacco: Never Used      Comment: quit 1 week ago     Alcohol use 6.0 oz/week      Comment: 4 beers daily     History   Drug Use No       REVIEW OF SYSTEMS:                                                    C: NEGATIVE for fever, chills, change in weight  I: NEGATIVE for worrisome rashes, moles or lesions  E: NEGATIVE for vision changes or irritation  E/M: NEGATIVE for ear, mouth and throat problems  R: NEGATIVE for significant cough or SOB  CV: NEGATIVE for chest pain, palpitations or peripheral  "edema  GI: NEGATIVE for nausea, abdominal pain, heartburn, or change in bowel habits  : NEGATIVE for frequency, dysuria, or hematuria  M: NEGATIVE for significant arthralgias or myalgia  N: NEGATIVE for weakness, dizziness or paresthesias  E: NEGATIVE for temperature intolerance, skin/hair changes  H: NEGATIVE for bleeding problems  P: NEGATIVE for changes in mood or affect    EXAM:                                                    /60  Pulse 72  Temp 97.8  F (36.6  C) (Temporal)  Resp 8  Ht 5' 4.57\" (1.64 m)  Wt 209 lb (94.8 kg)  BMI 35.25 kg/m2    GENERAL APPEARANCE: alert, no distress and over weight     EYES: EOMI, PERRL     HENT: ear canals and TM's normal and nose and mouth without ulcers or lesions     NECK: no adenopathy, no asymmetry, masses, or scars and thyroid normal to palpation     RESP: lungs clear to auscultation - no rales, rhonchi or wheezes     CV: regular rates and rhythm     ABDOMEN:  soft, nontender, no HSM or masses and bowel sounds normal     MS: extremities normal- no gross deformities noted, no evidence of inflammation in joints, FROM in all extremities.     SKIN: no suspicious lesions or rashes     PSYCH: mentation appears normal. and affect normal/bright     LYMPHATICS: normal ant/post cervical, supraclavicular nodes    DIAGNOSTICS:                                                    EKG: Not indicated due to non-vascular surgery and low risk of event (age <65 and without cardiac risk factors)    Recent Labs   Lab Test  04/02/10   0814   HGB  13.0      Labs Pending: CBC and BMP    IMPRESSION:                                                    Reason for surgery/procedure: thickened endometrium/ D&C  Diagnosis/reason for consult: Pre Op    The proposed surgical procedure is considered INTERMEDIATE risk.    REVISED CARDIAC RISK INDEX  The patient has the following serious cardiovascular risks for perioperative complications such as (MI, PE, VFib and 3  AV Block):  No serious cardiac " risks  INTERPRETATION: 0 risks: Class I (very low risk - 0.4% complication rate)    The patient has the following additional risks for perioperative complications:  No identified additional risks        ICD-10-CM    1. Preop general physical exam Z01.818    2. Thickened endometrium R93.8        RECOMMENDATIONS:                                                      --Patient is to take all scheduled medications on the day of surgery EXCEPT for modifications listed below.    APPROVAL GIVEN to proceed with proposed procedure, without further diagnostic evaluation       Signed Electronically by: Iona Lozada PA-C    Copy of this evaluation report is provided to requesting physician.    [facility] Preop Guidelines

## 2017-03-16 NOTE — IP AVS SNAPSHOT
MRN:[id number]                      After Visit Summary   3/16/2017    Genesis Adhikari    MRN: [id number]           Thank you!     Thank you for choosing [facility] for your care. Our goal is always to provide you with excellent care. Hearing back from our patients is one way we can continue to improve our services. Please take a few minutes to complete the written survey that you may receive in the mail after you visit with us. Thank you!        Patient Information     Date Of Birth          1965        About your hospital stay     You were admitted on:  March 16, 2017 You last received care in the:  [facility]    You were discharged on:  March 16, 2017       Who to Call     For medical emergencies, please call 911.  For non-urgent questions about your medical care, please call your primary care provider or clinic, [phone]  For questions related to your surgery, please call your surgery clinic        Attending Provider     Provider Socorro Bishop DO OB/Gyn       Primary Care Provider Office Phone # Fax #    JoanaAMADOU Bosch [facility] [phone]       [facility] [address] [place] [id number]        After Care Instructions     Discharge Instructions       Pelvic Rest. No tampons, douching or intercourse for  2  weeks.            Discharge Instructions       Patient may return to work POD  1            Discharge Instructions       Patient to arrange follow up appointment in 2 weeks            Shower        Shower on Post-op day 1.   DO NOT take a bath                  Your next 10 appointments already scheduled     Apr 11, 2017  3:45 PM CDT   SHORT with Socorro Ford DO   [facility] ([facility]    [address] [address] [place] [id number] [phone]              Further instructions from your care team       [facility] [place]  Same-Day Surgery   Adult  Discharge Orders & Instructions   For 24 hours after surgery  1. Get plenty of rest.  A responsible adult must stay with you for at least 24 hours after you leave the hospital.   2. Do not drive or use heavy equipment.  If you have weakness or tingling, don't drive or use heavy equipment until this feeling goes away.  3. Do not drink alcohol.  4. Avoid strenuous or risky activities.  Ask for help when climbing stairs.   5. You may feel lightheaded.  IF so, sit for a few minutes before standing.  Have someone help you get up.   6. If you have nausea (feel sick to your stomach): Drink only clear liquids such as apple juice, ginger ale, broth or 7-Up.  Rest may also help.  Be sure to drink enough fluids.  Move to a regular diet as you feel able.  7. You may have a slight fever. Call the doctor if your fever is over 100 F (37.7 C) (taken under the tongue) or lasts longer than 24 hours.  8. You may have a dry mouth, a sore throat, muscle aches or trouble sleeping.  These should go away after 24 hours.  9. Do not make important or legal decisions.   Call your doctor for any of the followin.  Signs of infection (fever, growing tenderness at the surgery site, a large amount of drainage or bleeding, severe pain, foul-smelling drainage, redness, swelling).    2. It has been over 8 to 10 hours since surgery and you are still not able to urinate (pass water).    3.  Headache for over 24 hours.    4.  Numbness, tingling or weakness the day after surgery (if you had spinal anesthesia).      Pending Results     No orders found from 3/14/2017 to 3/17/2017.            Admission Information     Date & Time Provider Department Dept. Phone    3/16/2017 Socorro Ford,  [facility] [phone]      Your Vitals Were     Blood Pressure Temperature Respirations Pulse Oximetry          102/48 97.5  F (36.4  C) (Temporal) 16 93%        MyChart Information     [employer] lets you send messages to your doctor, view  "your test results, renew your prescriptions, schedule appointments and more. To sign up, go to [email or web address] . Click on \"Log in\" on the left side of the screen, which will take you to the Welcome page. Then click on \"Sign up Now\" on the right side of the page.     You will be asked to enter the access code listed below, as well as some personal information. Please follow the directions to create your username and password.     Your access code is: [email or web address]  Expires: 2017  3:03 PM     Your access code will  in 90 days. If you need help or a new code, please call your [facility] clinic or [phone].        Care EveryWhere ID     This is your Care EveryWhere ID. This could be used by other organizations to access your [facility] medical records  [id number]           Review of your medicines      START taking        Dose / Directions    HYDROcodone-acetaminophen 5-325 MG per tablet   Commonly known as:  NORCO   Used for:  Post-op pain        Dose:  1-2 tablet   Take 1-2 tablets by mouth every 4 hours as needed for other (Moderate to Severe Pain)   Quantity:  30 tablet   Refills:  0       ibuprofen 600 MG tablet   Commonly known as:  ADVIL/MOTRIN   Used for:  Post-op pain        Dose:  600 mg   Take 1 tablet (600 mg) by mouth every 6 hours as needed for pain (mild)   Quantity:  30 tablet   Refills:  0         CONTINUE these medicines which have NOT CHANGED        Dose / Directions    citalopram 20 MG tablet   Commonly known as:  celeXA   Used for:  Major depressive disorder, recurrent episode, mild (H)        Dose:  20 mg   Take 1 tablet (20 mg) by mouth daily   Quantity:  90 tablet   Refills:  3       clobetasol propionate 0.05 % Sham   Used for:  Dermatitis, seborrheic        Apply topically daily as needed Apply daily in the shower for 1 week, then once weekly as needed.  Leave in place for 15 minutes then add water, lather and rinse thoroughly.   Quantity:  1 Bottle   Refills:  5       " fluocinonide 0.05 % ointment   Commonly known as:  LIDEX   Used for:  Dermatitis, seborrheic        Apply twice daily for 2 weeks behind the ears.   Quantity:  30 g   Refills:  0       ketoconazole 2 % cream   Commonly known as:  NIZORAL   Used for:  Dermatitis, seborrheic        Apply topically daily Use twice daily on the external ear on non steroid days   Quantity:  30 g   Refills:  11       triamcinolone 0.1 % cream   Commonly known as:  KENALOG   Used for:  Dermatitis, seborrheic        For external ears use twice daily for 2 weeks, then twice daily weekends only.   Quantity:  45 g   Refills:  1       varenicline 0.5 MG X 11 & 1 MG X 42 tablet   Commonly known as:  CHANTIX STARTING MONTH PAK   Used for:  Tobacco use disorder        Take 0.5 mg tab daily for 3 days, then 0.5 mg tab twice daily for 4 days, then 1 mg twice daily.   Quantity:  53 tablet   Refills:  1            Where to get your medicines      Some of these will need a paper prescription and others can be bought over the counter. Ask your nurse if you have questions.     Bring a paper prescription for each of these medications     HYDROcodone-acetaminophen 5-325 MG per tablet    ibuprofen 600 MG tablet                Protect others around you: Learn how to safely use, store and throw away your medicines at www.disposemymeds.org.             Medication List: This is a list of all your medications and when to take them. Check marks below indicate your daily home schedule. Keep this list as a reference.      Medications           Morning Afternoon Evening Bedtime As Needed    citalopram 20 MG tablet   Commonly known as:  celeXA   Take 1 tablet (20 mg) by mouth daily                                clobetasol propionate 0.05 % Sham   Apply topically daily as needed Apply daily in the shower for 1 week, then once weekly as needed.  Leave in place for 15 minutes then add water, lather and rinse thoroughly.                                fluocinonide 0.05  % ointment   Commonly known as:  LIDEX   Apply twice daily for 2 weeks behind the ears.                                HYDROcodone-acetaminophen 5-325 MG per tablet   Commonly known as:  NORCO   Take 1-2 tablets by mouth every 4 hours as needed for other (Moderate to Severe Pain)                                ibuprofen 600 MG tablet   Commonly known as:  ADVIL/MOTRIN   Take 1 tablet (600 mg) by mouth every 6 hours as needed for pain (mild)                                ketoconazole 2 % cream   Commonly known as:  NIZORAL   Apply topically daily Use twice daily on the external ear on non steroid days                                triamcinolone 0.1 % cream   Commonly known as:  KENALOG   For external ears use twice daily for 2 weeks, then twice daily weekends only.                                varenicline 0.5 MG X 11 & 1 MG X 42 tablet   Commonly known as:  CHANTIX STARTING MONTH KYLER   Take 0.5 mg tab daily for 3 days, then 0.5 mg tab twice daily for 4 days, then 1 mg twice daily.

## 2017-03-16 NOTE — ANESTHESIA PREPROCEDURE EVALUATION
Anesthesia Evaluation     .        ROS/MED HX    ENT/Pulmonary:  - neg pulmonary ROS     Neurologic:  - neg neurologic ROS     Cardiovascular:  - neg cardiovascular ROS       METS/Exercise Tolerance:     Hematologic:  - neg hematologic  ROS       Musculoskeletal:  - neg musculoskeletal ROS       GI/Hepatic:  - neg GI/hepatic ROS       Renal/Genitourinary:  - ROS Renal section negative       Endo: Comment: History of Lap Band surgery    (+) Obesity, .      Psychiatric:  - neg psychiatric ROS       Infectious Disease:  - neg infectious disease ROS       Malignancy:      - no malignancy   Other: Comment: History of drug and alcohol abuse   - neg other ROS           Physical Exam  Normal systems: cardiovascular, pulmonary and dental    Airway   Mallampati: II  TM distance: >3 FB  Neck ROM: full    Dental     Cardiovascular       Pulmonary                     Anesthesia Plan      History & Physical Review  History and physical reviewed and following examination; no interval change.    ASA Status:  2 .    NPO Status:  > 8 hours    Plan for MAC with Intravenous induction. Maintenance will be TIVA.  Reason for MAC:  Deep or markedly invasive procedure (G8)  PONV prophylaxis:  Ondansetron (or other 5HT-3)       Postoperative Care  Postoperative pain management:  IV analgesics and Oral pain medications.      Consents  Anesthetic plan, risks, benefits and alternatives discussed with:  Patient..                          .

## 2017-03-20 LAB — COPATH REPORT: NORMAL

## 2017-04-11 ENCOUNTER — OFFICE VISIT (OUTPATIENT)
Dept: OBGYN | Facility: CLINIC | Age: 52
End: 2017-04-11
Payer: COMMERCIAL

## 2017-04-11 VITALS
SYSTOLIC BLOOD PRESSURE: 121 MMHG | DIASTOLIC BLOOD PRESSURE: 73 MMHG | WEIGHT: 207 LBS | BODY MASS INDEX: 34.91 KG/M2 | HEART RATE: 84 BPM

## 2017-04-11 DIAGNOSIS — R93.89 THICKENED ENDOMETRIUM: Primary | ICD-10-CM

## 2017-04-11 PROCEDURE — 99213 OFFICE O/P EST LOW 20 MIN: CPT | Performed by: OBSTETRICS & GYNECOLOGY

## 2017-04-11 ASSESSMENT — PAIN SCALES - GENERAL: PAINLEVEL: NO PAIN (0)

## 2017-04-11 NOTE — NURSING NOTE
"Chief Complaint   Patient presents with     Surgical Followup     D  and  C  / Hysteroscopy on 3/16/17---  remind colon due       Initial /73 (BP Location: Right arm)  Pulse 84  Wt 93.9 kg (207 lb)  BMI 34.91 kg/m2 Estimated body mass index is 34.91 kg/(m^2) as calculated from the following:    Height as of 3/14/17: 1.64 m (5' 4.57\").    Weight as of this encounter: 93.9 kg (207 lb).  Medication Reconciliation: complete       Recheck Biospy  "

## 2017-04-11 NOTE — MR AVS SNAPSHOT
"              After Visit Summary   4/11/2017    Genesis Adhikari    MRN: [id number]           Patient Information     Date Of Birth          1965        Visit Information        Provider Department      4/11/2017 3:45 PM Socorro Ford, DO [facility]        Today's Diagnoses     Thickened endometrium    -  1      Care Instructions    Please call if you any questions.    [facility]  [address] [place]   [address]  [phone]        Socorro Ford        Follow-ups after your visit        Follow-up notes from your care team     Return if symptoms worsen or fail to improve.      Who to contact     If you have questions or need follow up information about today's clinic visit or your schedule please contact [facility] directly at [phone].  Normal or non-critical lab and imaging results will be communicated to you by MyChart, letter or phone within 4 business days after the clinic has received the results. If you do not hear from us within 7 days, please contact the clinic through MyChart or phone. If you have a critical or abnormal lab result, we will notify you by phone as soon as possible.  Submit refill requests through [employer] or call your pharmacy and they will forward the refill request to us. Please allow 3 business days for your refill to be completed.          Additional Information About Your Visit        MyChart Information     [employer] lets you send messages to your doctor, view your test results, renew your prescriptions, schedule appointments and more. To sign up, go to [email or web address] . Click on \"Log in\" on the left side of the screen, which will take you to the Welcome page. Then click on \"Sign up Now\" on the right side of the page.     You will be asked to enter the access code listed below, as well as some personal information. Please follow the directions to create your username and password.     Your access " code is: [email or web address]  Expires: 2017  3:03 PM     Your access code will  in 90 days. If you need help or a new code, please call your [facility] clinic or [phone].        Care EveryWhere ID     This is your Care EveryWhere ID. This could be used by other organizations to access your [facility] medical records  [id number]        Your Vitals Were     Pulse BMI (Body Mass Index)                84 34.91 kg/m2           Blood Pressure from Last 3 Encounters:   17 121/73   17 101/56   17 100/60    Weight from Last 3 Encounters:   17 93.9 kg (207 lb)   17 94.8 kg (209 lb)   02/10/17 90.3 kg (199 lb)              Today, you had the following     No orders found for display       Primary Care Provider Office Phone # Fax #    JoanaAMADOU Bosch [facility] [phone]       [facility] [address] [facility] [address]        Thank you!     Thank you for choosing [facility]  for your care. Our goal is always to provide you with excellent care. Hearing back from our patients is one way we can continue to improve our services. Please take a few minutes to complete the written survey that you may receive in the mail after your visit with us. Thank you!             Your Updated Medication List - Protect others around you: Learn how to safely use, store and throw away your medicines at www.disposemymeds.org.          This list is accurate as of: 17  4:10 PM.  Always use your most recent med list.                   Brand Name Dispense Instructions for use    citalopram 20 MG tablet    celeXA    90 tablet    Take 1 tablet (20 mg) by mouth daily       clobetasol propionate 0.05 % Sham     1 Bottle    Apply topically daily as needed Apply daily in the shower for 1 week, then once weekly as needed.  Leave in place for 15 minutes then add water, lather and rinse thoroughly.       fluocinonide 0.05 % ointment    LIDEX    30 g    Apply twice daily for 2  weeks behind the ears.       ibuprofen 600 MG tablet    ADVIL/MOTRIN    30 tablet    Take 1 tablet (600 mg) by mouth every 6 hours as needed for pain (mild)       ketoconazole 2 % cream    NIZORAL    30 g    Apply topically daily Use twice daily on the external ear on non steroid days       triamcinolone 0.1 % cream    KENALOG    45 g    For external ears use twice daily for 2 weeks, then twice daily weekends only.       varenicline 0.5 MG X 11 & 1 MG X 42 tablet    CHANTIX STARTING MONTH KYLER    53 tablet    Take 0.5 mg tab daily for 3 days, then 0.5 mg tab twice daily for 4 days, then 1 mg twice daily.

## 2017-04-11 NOTE — PROGRESS NOTES
Subjective  51 year old non-pregnant female presents today as a post-op from a HD&C on 3/16/17 for a thickened endometrial lining of 10mm.  Patient had an endometrial ablation done in .  No vaginal bleeding since endometrial ablation.  Patient states she is doing well since surgery.  No vaginal bleeding.  No problems urinating.  Normal bowel movements.  Some fever but patient admits to having the flu recently.  Now she has a cold.  No vaginal discharge, itching, or odor.  We reviewed patient's pictures and pathology from surgery.  All questions answered.      ROS: 10 point ROS neg other than the symptoms noted above in the HPI.  Past Medical History:   Diagnosis Date     NO ACTIVE PROBLEMS      Past Surgical History:   Procedure Laterality Date     AS ABLATION, ENDOMETRIAL, THERMAL, W/O HYSTEROSCOPIC GUIDANCE  3/21/11    uterine ablation     C LIGATE FALLOPIAN TUBE,POSTPARTUM  2002     DILATION AND CURETTAGE, HYSTEROSCOPY DIAGNOSTIC, COMBINED N/A 2017    Thickened endometrial lining     DILATION AND CURETTAGE, HYSTEROSCOPY DIAGNOSTIC, COMBINED N/A 3/16/2017    Procedure: COMBINED DILATION AND CURETTAGE, HYSTEROSCOPY DIAGNOSTIC;  Surgeon: Socorro Ford DO;  Location: [facility] OR      TOOTH EXTRACTION W/FORCEP       HYSTEROSCOPY  3/21/11     lap band  3/23/13     Family History   Problem Relation Age of Onset     CANCER Father      liver and stomach     HEART DISEASE Maternal Grandfather       of heart problems     CANCER Maternal Grandmother      breast cancer     DIABETES No family hx of      Hypertension No family hx of      Hyperlipidemia No family hx of      Colon Cancer No family hx of      Anxiety Disorder No family hx of      Substance Abuse No family hx of      Asthma No family hx of      Genetic Disorder No family hx of      OSTEOPOROSIS No family hx of      Anesthesia Reaction No family hx of      MENTAL ILLNESS No family hx of      Depression No family hx of      Other Cancer No  family hx of      Prostate Cancer No family hx of      Breast Cancer No family hx of      CEREBROVASCULAR DISEASE No family hx of      Coronary Artery Disease No family hx of      Social History   Substance Use Topics     Smoking status: Former Smoker     Packs/day: 0.50     Years: 25.00     Types: Cigarettes     Smokeless tobacco: Never Used      Comment: quit 1 week ago     Alcohol use 6.0 oz/week      Comment: 4 beers daily         Objective  Vitals: /73 (BP Location: Right arm)  Pulse 84  Wt 93.9 kg (207 lb)  BMI 34.91 kg/m2  BMI= Body mass index is 34.91 kg/(m^2).      Pathology=3/16/17:  FINAL DIAGNOSIS:   ENDOMETRIUM, CURETTAGE:   - Proliferative-type endometrium with tubal metaplasia   - Fragments of endocervical epithelium with mild chronic inflammation,   squamous metaplasia and reactive changes   - No evidence of hyperplasia, atypia or malignancy       Assessment  1.)  S/p HD&C on 3/16/17=benign pathology  2.)  Thickened endometrial lining      Plan  1.)  Follow up as needed      Socorro Ford

## 2017-04-11 NOTE — PATIENT INSTRUCTIONS
Please call if you any questions.    [facility]  [address] [place]   [address]  [phone]        Socorro Ford

## 2017-04-12 ENCOUNTER — TELEPHONE (OUTPATIENT)
Dept: FAMILY MEDICINE | Facility: CLINIC | Age: 52
End: 2017-04-12

## 2017-04-12 NOTE — TELEPHONE ENCOUNTER
Summary:    Patient is due/failing the following:   COLONOSCOPY    Action needed:   Schedule a colonoscopy or complete a FIT test     Type of outreach:    Sent letter.    Questions for provider review:    None                                                                                                                                    Lianne Blackwell       Chart routed to Care Team .        Panel Management Review      Patient has the following on her problem list:     Depression / Dysthymia review  PHQ-9 SCORE 6/17/2016 6/24/2016 1/23/2017   Total Score - - -   Total Score 1 2 0      Patient is due for:  None      Composite cancer screening  Chart review shows that this patient is due/due soon for the following Colonoscopy

## 2017-04-12 NOTE — LETTER
[facility]  [address], [address]  [place] [address]  Phone: [phone]  Fax: [phone]  April 12, 2017      Genesis Adhikari  [address] [place] [address]      Dear Genesis,    We care about your health and have reviewed your health plan including your medical conditions, medications, and lab results.  Based on this review, it is recommended that you follow up regarding the following health topic(s):  -Colon Cancer Screening    We recommend you take the following action(s):  -schedule a COLONOSCOPY to look for colon cancer (due every 10 years or 5 years in higher risk situations.)  Colonoscopies can prevent 90-95% of colon cancer deaths.  Problem lesions can be removed before they ever become cancer.  If you do not wish to do a colonoscopy or cannot afford to do one at this time, there is another option called a Fecal Immunochemical Occult Blood Test (FIT) a take home stool sample kit.  It does not replace the colonoscopy for colorectal cancer screening, but it can detect hidden bleeding in the lower colon.  It does need to be repeated every year and if a positive result is obtained, you would be referred for a colonoscopy.  If you have completed either one of these tests at another facility, please have the records sent to our clinic for our records.     Please call us at the [facility] - [phone] (or use "[employer] to address the above recommendations.     Thank you for trusting [facility] and we appreciate the opportunity to serve you.  We look forward to supporting your healthcare needs in the future.    Healthy Regards,    Your Health Care Team  [facility]

## 2017-07-24 ENCOUNTER — TELEPHONE (OUTPATIENT)
Dept: FAMILY MEDICINE | Facility: CLINIC | Age: 52
End: 2017-07-24

## 2017-07-24 NOTE — TELEPHONE ENCOUNTER
Summary:    Patient is due/failing the following:   COLONOSCOPY, LDL and PHQ9    Action needed:   Patient needs to do PHQ9., Patient needs fasting lab only appointment and schedule a colonoscopy     Type of outreach:    Phone, left message for patient to call back.     Questions for provider review:    None                                                                                                                                    Lianne Blackwell     Chart routed to Care Team .        Panel Management Review      Patient has the following on her problem list:     Depression / Dysthymia review  PHQ-9 SCORE 6/17/2016 6/24/2016 1/23/2017   Total Score - - -   Total Score 1 2 0      Patient is due for:  PHQ9        Composite cancer screening  Chart review shows that this patient is due/due soon for the following Colonoscopy

## 2017-07-27 ENCOUNTER — TELEPHONE (OUTPATIENT)
Dept: FAMILY MEDICINE | Facility: CLINIC | Age: 52
End: 2017-07-27

## 2017-07-27 DIAGNOSIS — F33.0 MAJOR DEPRESSIVE DISORDER, RECURRENT EPISODE, MILD (H): ICD-10-CM

## 2017-07-27 NOTE — TELEPHONE ENCOUNTER
citalopram (CELEXA) 20 MG tablet     Last Written Prescription Date: 6/13/2017  Last Fill Quantity: 30, # refills: 0  Last Office Visit with [facility] primary care provider:  3/14/2017        Last PHQ-9 score on record=   PHQ-9 SCORE 1/23/2017   Total Score -   Total Score 0

## 2017-07-28 RX ORDER — CITALOPRAM HYDROBROMIDE 20 MG/1
TABLET ORAL
Qty: 14 TABLET | Refills: 0 | Status: SHIPPED | OUTPATIENT
Start: 2017-07-28 | End: 2017-08-15

## 2017-07-28 NOTE — TELEPHONE ENCOUNTER
Routing refill request to provider for review/approval because:  Joyce given x1 and patient did not follow up, please advise    Sahil Gentile, RN, BSN

## 2017-07-29 DIAGNOSIS — F33.0 MAJOR DEPRESSIVE DISORDER, RECURRENT EPISODE, MILD (H): ICD-10-CM

## 2017-07-31 RX ORDER — CITALOPRAM HYDROBROMIDE 20 MG/1
TABLET ORAL
Qty: 30 TABLET | Refills: 0 | OUTPATIENT
Start: 2017-07-31

## 2017-07-31 NOTE — TELEPHONE ENCOUNTER
celexa     Last Written Prescription Date: 07/28/17  Last Fill Quantity: 14, # refills: 0  Last Office Visit with [facility] primary care provider:  03/14/17        Last PHQ-9 score on record=   PHQ-9 SCORE 1/23/2017   Total Score -   Total Score 0

## 2017-08-15 ENCOUNTER — VIRTUAL VISIT (OUTPATIENT)
Dept: FAMILY MEDICINE | Facility: CLINIC | Age: 52
End: 2017-08-15
Payer: COMMERCIAL

## 2017-08-15 DIAGNOSIS — Z78.9 ALCOHOL USE: Primary | ICD-10-CM

## 2017-08-15 DIAGNOSIS — F33.0 MAJOR DEPRESSIVE DISORDER, RECURRENT EPISODE, MILD (H): ICD-10-CM

## 2017-08-15 PROCEDURE — 98966 PH1 ASSMT&MGMT NQHP 5-10: CPT | Performed by: NURSE PRACTITIONER

## 2017-08-15 RX ORDER — CITALOPRAM HYDROBROMIDE 20 MG/1
TABLET ORAL
Qty: 90 TABLET | Refills: 1 | Status: SHIPPED | OUTPATIENT
Start: 2017-08-15 | End: 2018-03-14

## 2017-08-15 RX ORDER — CITALOPRAM HYDROBROMIDE 20 MG/1
TABLET ORAL
Qty: 90 TABLET | Refills: 0 | Status: SHIPPED | OUTPATIENT
Start: 2017-08-15 | End: 2017-08-15

## 2017-08-15 ASSESSMENT — PATIENT HEALTH QUESTIONNAIRE - PHQ9
SUM OF ALL RESPONSES TO PHQ QUESTIONS 1-9: 0
5. POOR APPETITE OR OVEREATING: NOT AT ALL

## 2017-08-15 ASSESSMENT — ANXIETY QUESTIONNAIRES
6. BECOMING EASILY ANNOYED OR IRRITABLE: NOT AT ALL
IF YOU CHECKED OFF ANY PROBLEMS ON THIS QUESTIONNAIRE, HOW DIFFICULT HAVE THESE PROBLEMS MADE IT FOR YOU TO DO YOUR WORK, TAKE CARE OF THINGS AT HOME, OR GET ALONG WITH OTHER PEOPLE: NOT DIFFICULT AT ALL
3. WORRYING TOO MUCH ABOUT DIFFERENT THINGS: SEVERAL DAYS
7. FEELING AFRAID AS IF SOMETHING AWFUL MIGHT HAPPEN: NOT AT ALL
2. NOT BEING ABLE TO STOP OR CONTROL WORRYING: SEVERAL DAYS
5. BEING SO RESTLESS THAT IT IS HARD TO SIT STILL: NOT AT ALL
GAD7 TOTAL SCORE: 3
1. FEELING NERVOUS, ANXIOUS, OR ON EDGE: SEVERAL DAYS

## 2017-08-15 NOTE — PROGRESS NOTES
Brother and his girlfriend (Scarlett) are trying to move out- is post-georgie is going to live in the camper for awhile.  Mom coming to live with her. She is healthy- will be a good support.   High stress.   Umesh is undergoing therapy- advising the brother leave.   Eldest daughter graduated.   High family stress.   Good with dose.     Advised to keep on same dose, cut back alcohol to under 8/week- pt. States she can do this.     Re-check in 6 months recommended with PE.

## 2017-08-15 NOTE — MR AVS SNAPSHOT
"              After Visit Summary   8/15/2017    Genesis Adhikari    MRN: [id number]           Patient Information     Date Of Birth          1965        Visit Information        Provider Department      8/15/2017 12:20 PM Joana Taylor APRN CNP [facility]        Today's Diagnoses     Alcohol use    -  1    Major depressive disorder, recurrent episode, mild (H)           Follow-ups after your visit        Who to contact     If you have questions or need follow up information about today's clinic visit or your schedule please contact [facility] directly at [phone].  Normal or non-critical lab and imaging results will be communicated to you by [employer], letter or phone within 4 business days after the clinic has received the results. If you do not hear from us within 7 days, please contact the clinic through [employer] or phone. If you have a critical or abnormal lab result, we will notify you by phone as soon as possible.  Submit refill requests through [employer] or call your pharmacy and they will forward the refill request to us. Please allow 3 business days for your refill to be completed.          Additional Information About Your Visit        MyChart Information     [employer] lets you send messages to your doctor, view your test results, renew your prescriptions, schedule appointments and more. To sign up, go to [email or web address] . Click on \"Log in\" on the left side of the screen, which will take you to the Welcome page. Then click on \"Sign up Now\" on the right side of the page.     You will be asked to enter the access code listed below, as well as some personal information. Please follow the directions to create your username and password.     Your access code is: [email or web address]  Expires: 2017 12:54 PM     Your access code will  in 90 days. If you need help or a new code, please call your [facility] or [phone].        Care EveryWhere ID     This is your " Care EveryWhere ID. This could be used by other organizations to access your [facility] medical records  [id number]         Blood Pressure from Last 3 Encounters:   04/11/17 121/73   03/16/17 101/56   03/14/17 100/60    Weight from Last 3 Encounters:   04/11/17 207 lb (93.9 kg)   03/14/17 209 lb (94.8 kg)   02/10/17 199 lb (90.3 kg)              Today, you had the following     No orders found for display         Today's Medication Changes          These changes are accurate as of: 8/15/17 12:54 PM.  If you have any questions, ask your nurse or doctor.               Start taking these medicines.        Dose/Directions    citalopram 20 MG tablet   Commonly known as:  celeXA   Used for:  Major depressive disorder, recurrent episode, mild (H)   Started by:  Joana Taylor APRN CNP        TAKE ONE TABLET BY MOUTH ONCE DAILY   Quantity:  90 tablet   Refills:  1            Where to get your medicines      These medications were sent to [place] [address] [facility] - [place] - [address] [address] [place] [address]  [id number] [address]     Phone:  [phone]     citalopram 20 MG tablet                Primary Care Provider Office Phone # Fax #    AMADOU Smith [phone] [address] [facility] [address]        Equal Access to Services     [facility]: Hadii aad ku hadasho Soomaali, waaxda luqadaha, qaybta kaalmada adeegyada, waxdejan morris. So [facility] [phone].    ATENCIÓN: Si habla español, tiene a flores disposición servicios gratuitos de asistencia lingüística. Marito al [phone].    We comply with applicable federal civil rights laws and [place] laws. We do not discriminate on the basis of race, color, national origin, age, disability sex, sexual orientation or gender identity.            Thank you!     Thank you for choosing [facility]  for your care. Our goal is always to provide you with excellent care. Hearing back from our patients is one way we can  continue to improve our services. Please take a few minutes to complete the written survey that you may receive in the mail after your visit with us. Thank you!             Your Updated Medication List - Protect others around you: Learn how to safely use, store and throw away your medicines at www.disposemymeds.org.          This list is accurate as of: 8/15/17 12:54 PM.  Always use your most recent med list.                   Brand Name Dispense Instructions for use Diagnosis    citalopram 20 MG tablet    celeXA    90 tablet    TAKE ONE TABLET BY MOUTH ONCE DAILY    Major depressive disorder, recurrent episode, mild (H)       clobetasol propionate 0.05 % Sham     1 Bottle    Apply topically daily as needed Apply daily in the shower for 1 week, then once weekly as needed.  Leave in place for 15 minutes then add water, lather and rinse thoroughly.    Dermatitis, seborrheic       fluocinonide 0.05 % ointment    LIDEX    30 g    Apply twice daily for 2 weeks behind the ears.    Dermatitis, seborrheic       ibuprofen 600 MG tablet    ADVIL/MOTRIN    30 tablet    Take 1 tablet (600 mg) by mouth every 6 hours as needed for pain (mild)    Post-op pain       ketoconazole 2 % cream    NIZORAL    30 g    Apply topically daily Use twice daily on the external ear on non steroid days    Dermatitis, seborrheic       triamcinolone 0.1 % cream    KENALOG    45 g    For external ears use twice daily for 2 weeks, then twice daily weekends only.    Dermatitis, seborrheic       varenicline 0.5 MG X 11 & 1 MG X 42 tablet    CHANTIX STARTING MONTH KYLER    53 tablet    Take 0.5 mg tab daily for 3 days, then 0.5 mg tab twice daily for 4 days, then 1 mg twice daily.    Tobacco use disorder

## 2017-08-15 NOTE — PROGRESS NOTES
"Genesis Adhikari is a 52 year old female who is being evaluated via a telephone visit.      The patient has been notified of following:     \"This telephone visit will be conducted via a call between you and your physician/provider. We have found that certain health care needs can be provided without the need for a physical exam.  This service lets us provide the care you need with a short phone conversation.  If a prescription is necessary we can send it directly to your pharmacy.  If lab work is needed we can place an order for that and you can then stop by our lab to have the test done at a later time.    We will bill your insurance company for this service.  Please check with your medical insurance if this type of visit is covered. You may be responsible for the cost of this type of visit if insurance coverage is denied.  The typical cost is $30 (10min), $59 (11-20min) and $85 (21-30min).  Most often these visits are shorter than 10 minutes.    If during the course of the call the physician/provider feels a telephone visit is not appropriate, you will not be charged for this service.\"       Consent has been obtained for this service by 2 care team members: yes. See the scanned image in the medical record.    Genesis Adhikari complains of  Depression and Panel Management      I have reviewed and updated the patient's Past Medical History, Social History, Family History and Medication List.    ALLERGIES  No known drug allergies    Sudarshan Guevara MA   (MA signature)    Additional provider notes: see above    Assessment/Plan:     Major depressive disorder, recurrent episode, mild (H)  Alcohol use     I have reviewed the note as documented above.  This accurately captures the substance of my conversation with the patient,  See above    Total time of call between patient and provider was 6.4 minutes     "

## 2017-08-16 ASSESSMENT — ANXIETY QUESTIONNAIRES: GAD7 TOTAL SCORE: 3

## 2017-10-30 ENCOUNTER — TELEPHONE (OUTPATIENT)
Dept: FAMILY MEDICINE | Facility: CLINIC | Age: 52
End: 2017-10-30

## 2017-10-30 NOTE — TELEPHONE ENCOUNTER
Summary:    Patient is due/failing the following:   COLONOSCOPY    Action needed:   Schedule a colonoscopy or complete a FIT test     Type of outreach:    Phone, left message for patient to call back.     Questions for provider review:    None                                                                                                                                    Lianne Blackwell       Chart routed to Care Team .        Panel Management Review      Patient has the following on her problem list:     Depression / Dysthymia review    Measure:  Needs PHQ-9 score of 4 or less during index window.  Administer PHQ-9 and if score is 5 or more, send encounter to provider for next steps.        PHQ-9 SCORE 6/24/2016 1/23/2017 8/15/2017   Total Score - - -   Total Score 2 0 0       If PHQ-9 recheck is 5 or more, route to provider for next steps.    Patient is due for:  none    Composite cancer screening  Chart review shows that this patient is due/due soon for the following Colonoscopy

## 2017-10-30 NOTE — LETTER
[facility]  [address], [address]  [place] [address]  Phone: [phone]  Fax: [phone]  November 29, 2017      Genesis Adhikari  [address] [place] [address]      Dear Genesis,    We care about your health and have reviewed your health plan including your medical conditions, medications, and lab results.  Based on this review, it is recommended that you follow up regarding the following health topic(s):  -Colon Cancer Screening    We recommend you take the following action(s):  -schedule a COLONOSCOPY to look for colon cancer (due every 10 years or 5 years in higher risk situations.)  Colonoscopies can prevent 90-95% of colon cancer deaths.  Problem lesions can be removed before they ever become cancer.  If you do not wish to do a colonoscopy or cannot afford to do one at this time, there is another option called a Fecal Immunochemical Occult Blood Test (FIT) a take home stool sample kit.  It does not replace the colonoscopy for colorectal cancer screening, but it can detect hidden bleeding in the lower colon.  It does need to be repeated every year and if a positive result is obtained, you would be referred for a colonoscopy.  If you have completed either one of these tests at another facility, please have the records sent to our clinic for our records.     Please call us at the [facility] - [phone] (or use [employer]) to address the above recommendations.     Thank you for trusting [facility] and we appreciate the opportunity to serve you.  We look forward to supporting your healthcare needs in the future.    Healthy Regards,    Your Health Care Team  [facility]

## 2018-02-16 ENCOUNTER — TELEPHONE (OUTPATIENT)
Dept: FAMILY MEDICINE | Facility: CLINIC | Age: 53
End: 2018-02-16

## 2018-02-16 DIAGNOSIS — Z13.6 CARDIOVASCULAR SCREENING; LDL GOAL LESS THAN 160: Primary | ICD-10-CM

## 2018-02-16 NOTE — LETTER
[facility]  [address], [address]  [place] [address]  Phone: [phone]  Fax: [phone]  March 22, 2018      Genesis Adhikari  [address] [place] [address]      Dear Genesis,    We care about your health and have reviewed your health plan including your medical conditions, medications, and lab results.  Based on this review, it is recommended that you follow up regarding the following health topic(s):  -Colon Cancer Screening    We recommend you take the following action(s):  -schedule a COLONOSCOPY to look for colon cancer (due every 10 years or 5 years in higher risk situations.)  Colonoscopies can prevent 90-95% of colon cancer deaths.  Problem lesions can be removed before they ever become cancer.  If you do not wish to do a colonoscopy or cannot afford to do one at this time, there is another option called a Fecal Immunochemical Occult Blood Test (FIT) a take home stool sample kit.  It does not replace the colonoscopy for colorectal cancer screening, but it can detect hidden bleeding in the lower colon.  It does need to be repeated every year and if a positive result is obtained, you would be referred for a colonoscopy.  If you have completed either one of these tests at another facility, please have the records sent to our clinic for our records.     Please call us at the [facility] - [phone] (or use [employer]) to address the above recommendations.     Thank you for trusting [facility] and we appreciate the opportunity to serve you.  We look forward to supporting your healthcare needs in the future.    Healthy Regards,    Your Health Care Team  [facility]

## 2018-02-16 NOTE — TELEPHONE ENCOUNTER
Summary:    Patient is due/failing the following:   COLONOSCOPY, PHQ9 and LDL    Action needed:   Schedule a colonoscopy or complete a FIT test, PHQ9 and fasting lab    Type of outreach:    Phone, left message for patient to call back.     Questions for provider review:    None                                                                                                                                    Lianne Blackwell       Chart routed to Care Team .        Panel Management Review      Patient has the following on her problem list:     Depression / Dysthymia review    Measure:  Needs PHQ-9 score of 4 or less during index window.  Administer PHQ-9 and if score is 5 or more, send encounter to provider for next steps.      PHQ-9 SCORE 6/24/2016 1/23/2017 8/15/2017   Total Score - - -   Total Score 2 0 0       If PHQ-9 recheck is 5 or more, route to provider for next steps.    Patient is due for:  PHQ9      Composite cancer screening  Chart review shows that this patient is due/due soon for the following Colonoscopy

## 2018-03-13 NOTE — PROGRESS NOTES
"  SUBJECTIVE:   Genesis Adhikari is a 52 year old female who presents to clinic today for the following health issues:      History of Present Illness   Frequency of exercise:  2-3 days/week  Duration of exercise:  30-45 minutes  Taking medications regularly:  Yes    1. URINARY TRACT SYMPTOMS  Onset: 3 weeks   Cranberry juice and azo seemed to hold off sx. But worsening in past 4-5 days.  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   Burning w/urination- as passing urine and at end of urine stream. Up at night- 3x per night, usually up 1x. Then burning lasts.   Menopause- s/p ablation. Discharge- milky - no odor    Description:   Painful urination (Dysuria): YES  Blood in urine (Hematuria): no   Delay in urine (Hesitency): no     Intensity: severe, 8/10    Progression of Symptoms:  worsening    Accompanying Signs & Symptoms:  Fever/chills: no   Flank pain no   Nausea and vomiting: no   Any vaginal symptoms: vaginal odor, \" Smells like medicine\"  Abdominal/Pelvic Pain: no     History:   History of frequent UTI's: YES  History of kidney stones: no   Sexually Active: YES. No concerns STDs. No new partners  Possibility of pregnancy: No    Therapies Tried and outcome: Cranberry juice, Cranberry pills and Azos. Patient states it helped in the beginning now symtoms are worsening.     2. Depression Followup- on celexa for years for both depression and anxiety  Can sleep well. Not crying. Was crying at commercials.   Had been on higher dose when friend had cancer, but reduced a few years ago and doing well.   Mood stable- good. Sleep good. Good concentration. No SI/HI  No side effects.    Status since last visit: Stable     See PHQ-9 for current symptoms.  Other associated symptoms: None    Complicating factors:   Significant life event:  No   Current substance abuse:  Alcohol  Anxiety or Panic symptoms:  No    CHELSY-7 SCORE 1/23/2017 8/15/2017 3/14/2018   Total Score - - -   Total " Score - - 0 (minimal anxiety)   Total Score 0 3 0       PHQ-9 2017 8/15/2017 3/14/2018   Total Score 0 0 0   Q9: Suicide Ideation Not at all Not at all Not at all       PHQ-9  English  PHQ-9   Any Language  Suicide Assessment Five-step Evaluation and Treatment (SAFE-T)  Problem list and histories reviewed & adjusted, as indicated.  Additional history: as documented      Patient Active Problem List   Diagnosis     Vaginitis and vulvovaginitis     Depressive disorder, not elsewhere classified     Other, mixed, or unspecified nondependent drug abuse, in remission     CARDIOVASCULAR SCREENING; LDL GOAL LESS THAN 160     Major depressive disorder, recurrent episode, mild (H)     Obesity     H/O laparoscopic adjustable gastric banding     Seborrheic dermatitis of scalp     Alcohol use     Uterine enlargement     Cyst of uterus     Thickened endometrium     S/P endometrial ablation     Polyp of corpus uteri     Past Surgical History:   Procedure Laterality Date     AS ABLATION, ENDOMETRIAL, THERMAL, W/O HYSTEROSCOPIC GUIDANCE  3/21/11    uterine ablation     C LIGATE FALLOPIAN TUBE,POSTPARTUM  2002     DILATION AND CURETTAGE, HYSTEROSCOPY DIAGNOSTIC, COMBINED N/A 2017    Thickened endometrial lining     DILATION AND CURETTAGE, HYSTEROSCOPY DIAGNOSTIC, COMBINED N/A 3/16/2017    Procedure: COMBINED DILATION AND CURETTAGE, HYSTEROSCOPY DIAGNOSTIC;  Surgeon: Socorro Ford DO;  Location: [facility] OR     HC TOOTH EXTRACTION W/FORCEP       HYSTEROSCOPY  3/21/11     lap band  3/23/13       Social History   Substance Use Topics     Smoking status: Former Smoker     Packs/day: 0.50     Years: 25.00     Types: Cigarettes     Smokeless tobacco: Never Used      Comment: quit        Alcohol use 6.0 oz/week      Comment: weekends- 10-12/week     Family History   Problem Relation Age of Onset     CANCER Father      liver and stomach     HEART DISEASE Maternal Grandfather       of heart problems     CANCER  Maternal Grandmother      breast cancer     DIABETES No family hx of      Hypertension No family hx of      Hyperlipidemia No family hx of      Colon Cancer No family hx of      Anxiety Disorder No family hx of      Substance Abuse No family hx of      Asthma No family hx of      Genetic Disorder No family hx of      OSTEOPOROSIS No family hx of      Anesthesia Reaction No family hx of      MENTAL ILLNESS No family hx of      Depression No family hx of      Other Cancer No family hx of      Prostate Cancer No family hx of      Breast Cancer No family hx of      CEREBROVASCULAR DISEASE No family hx of      Coronary Artery Disease No family hx of          Current Outpatient Prescriptions   Medication Sig Dispense Refill     citalopram (CELEXA) 20 MG tablet TAKE ONE TABLET BY MOUTH ONCE DAILY 90 tablet 1     ciprofloxacin (CIPRO) 250 MG tablet Take 1 tablet (250 mg) by mouth 2 times daily 6 tablet 0     ibuprofen (ADVIL/MOTRIN) 600 MG tablet Take 1 tablet (600 mg) by mouth every 6 hours as needed for pain (mild) 30 tablet 0     ketoconazole (NIZORAL) 2 % cream Apply topically daily Use twice daily on the external ear on non steroid days 30 g 11     triamcinolone (KENALOG) 0.1 % cream For external ears use twice daily for 2 weeks, then twice daily weekends only. 45 g 1     fluocinonide (LIDEX) 0.05 % ointment Apply twice daily for 2 weeks behind the ears. 30 g 0     clobetasol propionate 0.05 % SHAM Apply topically daily as needed Apply daily in the shower for 1 week, then once weekly as needed.  Leave in place for 15 minutes then add water, lather and rinse thoroughly. 1 Bottle 5     nitroFURantoin, macrocrystal-monohydrate, (MACROBID) 100 MG capsule Take 1 capsule (100 mg) by mouth 2 times daily 14 capsule 0     [DISCONTINUED] citalopram (CELEXA) 20 MG tablet TAKE ONE TABLET BY MOUTH ONCE DAILY 90 tablet 1     varenicline (CHANTIX STARTING MONTH PAK) 0.5 MG X 11 & 1 MG X 42 tablet Take 0.5 mg tab daily for 3 days,  "then 0.5 mg tab twice daily for 4 days, then 1 mg twice daily. (Patient not taking: Reported on 8/15/2017) 53 tablet 1     Allergies   Allergen Reactions     No Known Drug Allergies      BP Readings from Last 3 Encounters:   03/14/18 122/70   04/11/17 121/73   03/16/17 101/56    Wt Readings from Last 3 Encounters:   03/14/18 206 lb 8 oz (93.7 kg)   04/11/17 207 lb (93.9 kg)   03/14/17 209 lb (94.8 kg)                  Labs reviewed in EPIC    ROS:  Constitutional, HEENT, cardiovascular, pulmonary, gi and gu systems are negative, except as otherwise noted.    OBJECTIVE:     /70  Pulse 66  Temp 96.9  F (36.1  C) (Temporal)  Resp 16  Ht 5' 5\" (1.651 m)  Wt 206 lb 8 oz (93.7 kg)  SpO2 96%  BMI 34.36 kg/m2  Body mass index is 34.36 kg/(m^2).  GENERAL: healthy, alert and no distress  EYES: Eyes grossly normal to inspection, PERRL and conjunctivae and sclerae normal  NECK: no adenopathy, no asymmetry, masses, or scars and thyroid normal to palpation  RESP: lungs clear to auscultation - no rales, rhonchi or wheezes  CV: regular rate and rhythm, normal S1 S2, no S3 or S4, no murmur, click or rub, no peripheral edema and peripheral pulses strong  ABDOMEN: soft, nontender, no hepatosplenomegaly, no masses and bowel sounds normal  MS: no gross musculoskeletal defects noted, no edema  SKIN: white flaking patches behind ears (right >left) and along posterior hairline  NEURO: Normal strength and tone, mentation intact and speech normal  BACK: no CVA tenderness, no paralumbar tenderness  PSYCH: mentation appears normal, affect normal/bright    Diagnostic Test Results:  Results for orders placed or performed in visit on 03/14/18 (from the past 24 hour(s))   *UA reflex to Microscopic and Culture ([place] [facility] (except [place] and [place])   Result Value Ref Range    Color Urine Yellow     Appearance Urine Clear     Glucose Urine Negative NEG^Negative mg/dL    Bilirubin Urine Negative NEG^Negative    " Ketones Urine Negative NEG^Negative mg/dL    Specific Gravity Urine 1.010 1.003 - 1.035    Blood Urine Moderate (A) NEG^Negative    pH Urine 6.5 5.0 - 7.0 pH    Protein Albumin Urine 30 (A) NEG^Negative mg/dL    Urobilinogen Urine 0.2 0.2 - 1.0 EU/dL    Nitrite Urine Negative NEG^Negative    Leukocyte Esterase Urine Large (A) NEG^Negative    Source Midstream Urine    Urine Microscopic   Result Value Ref Range    WBC Urine 25-50 (A) OTO5^0 - 5 /HPF    RBC Urine 2-5 (A) OTO2^O - 2 /HPF    Squamous Epithelial /LPF Urine Few FEW^Few /LPF   Urine Culture Aerobic Bacterial   Result Value Ref Range    Specimen Description Midstream Urine     Special Requests Specimen received in preservative     Culture Micro PENDING    Wet prep   Result Value Ref Range    Specimen Description Vagina     Wet Prep No Trichomonas seen     Wet Prep No clue cells seen     Wet Prep No yeast seen        ASSESSMENT/PLAN:     1. Dysuria  Pt has tolerated cipro in the past while on celexa. Pharmacy called regarding med interaction  Changed to macrobid  Sent for culture  - *UA reflex to Microscopic and Culture ([facility] (except [place] and [place]); Future  - *UA reflex to Microscopic and Culture ([facility] (except [place] and [place])  - Urine Microscopic  - Urine Culture Aerobic Bacterial  - ciprofloxacin (CIPRO) 250 MG tablet; Take 1 tablet (250 mg) by mouth 2 times daily  Dispense: 6 tablet; Refill: 0    2. Major depressive disorder, recurrent episode, mild (H)  Stable, tolerates well. Refilled x 6mo  - citalopram (CELEXA) 20 MG tablet; TAKE ONE TABLET BY MOUTH ONCE DAILY  Dispense: 90 tablet; Refill: 1    3. Breast cancer screening  Info given for pt to schedule on her own  - *[place] Screening Digital Bilateral; Future    4. Seborrheic dermatitis of scalp  Referral pleced, pt is established with   - DERMATOLOGY REFERRAL    5. Vaginal odor  Normal wet prep  - Wet prep    Follow Up: For worsening  symptoms (ie new fevers, worsening pain, etc), non-improvement as expected/discussed, questions regarding your medications or treatment plan. Discussed parameters for follow up and included in After Visit Summary given to patient.      Yanique Jasso PA-C  [facility]

## 2018-03-14 ENCOUNTER — OFFICE VISIT (OUTPATIENT)
Dept: FAMILY MEDICINE | Facility: CLINIC | Age: 53
End: 2018-03-14
Payer: COMMERCIAL

## 2018-03-14 ENCOUNTER — TELEPHONE (OUTPATIENT)
Dept: FAMILY MEDICINE | Facility: CLINIC | Age: 53
End: 2018-03-14

## 2018-03-14 VITALS
HEART RATE: 66 BPM | SYSTOLIC BLOOD PRESSURE: 122 MMHG | TEMPERATURE: 96.9 F | BODY MASS INDEX: 34.41 KG/M2 | HEIGHT: 65 IN | DIASTOLIC BLOOD PRESSURE: 70 MMHG | OXYGEN SATURATION: 96 % | WEIGHT: 206.5 LBS | RESPIRATION RATE: 16 BRPM

## 2018-03-14 DIAGNOSIS — F33.0 MAJOR DEPRESSIVE DISORDER, RECURRENT EPISODE, MILD (H): ICD-10-CM

## 2018-03-14 DIAGNOSIS — L21.9 SEBORRHEIC DERMATITIS OF SCALP: ICD-10-CM

## 2018-03-14 DIAGNOSIS — N89.8 VAGINAL ODOR: ICD-10-CM

## 2018-03-14 DIAGNOSIS — R30.0 DYSURIA: Primary | ICD-10-CM

## 2018-03-14 DIAGNOSIS — Z12.39 BREAST CANCER SCREENING: ICD-10-CM

## 2018-03-14 LAB
ALBUMIN UR-MCNC: 30 MG/DL
APPEARANCE UR: CLEAR
BILIRUB UR QL STRIP: NEGATIVE
COLOR UR AUTO: YELLOW
GLUCOSE UR STRIP-MCNC: NEGATIVE MG/DL
HGB UR QL STRIP: ABNORMAL
KETONES UR STRIP-MCNC: NEGATIVE MG/DL
LEUKOCYTE ESTERASE UR QL STRIP: ABNORMAL
NITRATE UR QL: NEGATIVE
NON-SQ EPI CELLS #/AREA URNS LPF: ABNORMAL /LPF
PH UR STRIP: 6.5 PH (ref 5–7)
RBC #/AREA URNS AUTO: ABNORMAL /HPF
SOURCE: ABNORMAL
SP GR UR STRIP: 1.01 (ref 1–1.03)
SPECIMEN SOURCE: NORMAL
UROBILINOGEN UR STRIP-ACNC: 0.2 EU/DL (ref 0.2–1)
WBC #/AREA URNS AUTO: ABNORMAL /HPF
WET PREP SPEC: NORMAL

## 2018-03-14 PROCEDURE — 99214 OFFICE O/P EST MOD 30 MIN: CPT | Performed by: PHYSICIAN ASSISTANT

## 2018-03-14 PROCEDURE — 87086 URINE CULTURE/COLONY COUNT: CPT | Performed by: PHYSICIAN ASSISTANT

## 2018-03-14 PROCEDURE — 87088 URINE BACTERIA CULTURE: CPT | Performed by: PHYSICIAN ASSISTANT

## 2018-03-14 PROCEDURE — 81001 URINALYSIS AUTO W/SCOPE: CPT | Performed by: PHYSICIAN ASSISTANT

## 2018-03-14 PROCEDURE — 87210 SMEAR WET MOUNT SALINE/INK: CPT | Performed by: PHYSICIAN ASSISTANT

## 2018-03-14 PROCEDURE — 87186 SC STD MICRODIL/AGAR DIL: CPT | Performed by: PHYSICIAN ASSISTANT

## 2018-03-14 RX ORDER — CIPROFLOXACIN 250 MG/1
250 TABLET, FILM COATED ORAL 2 TIMES DAILY
Qty: 6 TABLET | Refills: 0 | Status: SHIPPED | OUTPATIENT
Start: 2018-03-14 | End: 2018-03-14 | Stop reason: ALTCHOICE

## 2018-03-14 RX ORDER — NITROFURANTOIN 25; 75 MG/1; MG/1
100 CAPSULE ORAL 2 TIMES DAILY
Qty: 14 CAPSULE | Refills: 0 | Status: SHIPPED | OUTPATIENT
Start: 2018-03-14 | End: 2018-06-11

## 2018-03-14 RX ORDER — CITALOPRAM HYDROBROMIDE 20 MG/1
TABLET ORAL
Qty: 90 TABLET | Refills: 1 | Status: SHIPPED | OUTPATIENT
Start: 2018-03-14 | End: 2018-09-03

## 2018-03-14 ASSESSMENT — ANXIETY QUESTIONNAIRES
3. WORRYING TOO MUCH ABOUT DIFFERENT THINGS: NOT AT ALL
GAD7 TOTAL SCORE: 0
2. NOT BEING ABLE TO STOP OR CONTROL WORRYING: NOT AT ALL
1. FEELING NERVOUS, ANXIOUS, OR ON EDGE: NOT AT ALL
GAD7 TOTAL SCORE: 0
4. TROUBLE RELAXING: NOT AT ALL
5. BEING SO RESTLESS THAT IT IS HARD TO SIT STILL: NOT AT ALL
6. BECOMING EASILY ANNOYED OR IRRITABLE: NOT AT ALL
7. FEELING AFRAID AS IF SOMETHING AWFUL MIGHT HAPPEN: NOT AT ALL
7. FEELING AFRAID AS IF SOMETHING AWFUL MIGHT HAPPEN: NOT AT ALL
GAD7 TOTAL SCORE: 0

## 2018-03-14 ASSESSMENT — PATIENT HEALTH QUESTIONNAIRE - PHQ9
SUM OF ALL RESPONSES TO PHQ QUESTIONS 1-9: 0
SUM OF ALL RESPONSES TO PHQ QUESTIONS 1-9: 0
10. IF YOU CHECKED OFF ANY PROBLEMS, HOW DIFFICULT HAVE THESE PROBLEMS MADE IT FOR YOU TO DO YOUR WORK, TAKE CARE OF THINGS AT HOME, OR GET ALONG WITH OTHER PEOPLE: NOT DIFFICULT AT ALL

## 2018-03-14 ASSESSMENT — PAIN SCALES - GENERAL: PAINLEVEL: NO PAIN (0)

## 2018-03-14 NOTE — TELEPHONE ENCOUNTER
[facility] calling stating there is a interaction between the prescribed Cipro with the Citalopram that patient is currently taking. Please advise.

## 2018-03-14 NOTE — MR AVS SNAPSHOT
After Visit Summary   3/14/2018    Genesis Adhikari    MRN: [id number]           Patient Information     Date Of Birth          1965        Visit Information        Provider Department      3/14/2018 9:40 AM Yanique Jasso PA-C [facility] [place]        Today's Diagnoses     Dysuria    -  1    Major depressive disorder, recurrent episode, mild (H)        Breast cancer screening        Seborrheic dermatitis of scalp        Vaginal odor          Care Instructions    To schedule your test or specialty referral please call:  [facility]:  Radiology (imaging- mammogram, ultrasound, CT, MRI): [phone]  Speciality consultation: [phone] [address]  [facility] [address]    cipro for bladder infection twice dialy x 3 days  Sending urine for culture  Call if yeast sx    Will let you know vaginal swab results                Follow-ups after your visit        Additional Services     DERMATOLOGY REFERRAL       Your provider has referred you to: [facility]: [facility] [phone]   [email or web address]    Please be aware that coverage of these services is subject to the terms and limitations of your health insurance plan.  Call member services at your health plan with any benefit or coverage questions.      Please bring the following with you to your appointment:    (1) Any X-Rays, CTs or MRIs which have been performed.  Contact the facility where they were done to arrange for  prior to your scheduled appointment.    (2) List of current medications  (3) This referral request   (4) Any documents/labs given to you for this referral                  Future tests that were ordered for you today     Open Future Orders        Priority Expected Expires Ordered    *MA Screening Digital Bilateral Routine  3/14/2019 3/14/2018    *UA reflex to Microscopic and Culture ([place]  "and [facility] (except [place] and [place]) Routine  3/13/2019 3/13/2018            Who to contact     If you have questions or need follow up information about today's clinic visit or your schedule please contact [facility] directly at [phone].  Normal or non-critical lab and imaging results will be communicated to you by MyChart, letter or phone within 4 business days after the clinic has received the results. If you do not hear from us within 7 days, please contact the clinic through MyChart or phone. If you have a critical or abnormal lab result, we will notify you by phone as soon as possible.  Submit refill requests through Get[employer] or call your pharmacy and they will forward the refill request to us. Please allow 3 business days for your refill to be completed.          Additional Information About Your Visit        MyChart Information     Getix lets you send messages to your doctor, view your test results, renew your prescriptions, schedule appointments and more. To sign up, go to [email or web address] . Click on \"Log in\" on the left side of the screen, which will take you to the Welcome page. Then click on \"Sign up Now\" on the right side of the page.     You will be asked to enter the access code listed below, as well as some personal information. Please follow the directions to create your username and password.     Your access code is: [email or web address]  Expires: 2018 10:10 AM     Your access code will  in 90 days. If you need help or a new code, please call your [place] clinic or [phone].        Care EveryWhere ID     This is your Care EveryWhere ID. This could be used by other organizations to access your [place] medical records  [id number]        Your Vitals Were     Pulse Temperature Respirations Height Pulse Oximetry BMI (Body Mass Index)    66 96.9  F (36.1  C) (Temporal) 16 5' 5\" (1.651 m) 96% 34.36 kg/m2       Blood Pressure from Last 3 Encounters: "   03/14/18 122/70   04/11/17 121/73   03/16/17 101/56    Weight from Last 3 Encounters:   03/14/18 206 lb 8 oz (93.7 kg)   04/11/17 207 lb (93.9 kg)   03/14/17 209 lb (94.8 kg)              We Performed the Following     *UA reflex to Microscopic and Culture ([place] and [facility] (except [place] and [place])     DEPRESSION ACTION PLAN (DAP)     DERMATOLOGY REFERRAL     Urine Culture Aerobic Bacterial     Urine Microscopic     Wet prep          Today's Medication Changes          These changes are accurate as of 3/14/18 10:10 AM.  If you have any questions, ask your nurse or doctor.               Start taking these medicines.        Dose/Directions    ciprofloxacin 250 MG tablet   Commonly known as:  CIPRO   Used for:  Dysuria   Started by:  Yanique Jasso PA-C        Dose:  250 mg   Take 1 tablet (250 mg) by mouth 2 times daily   Quantity:  6 tablet   Refills:  0            Where to get your medicines      These medications were sent to [place] [address] [place] [facility] [place] - [address] [address] [place] [address]  [address] [address] [place] [address], [facility] [address]     Phone:  [phone]     ciprofloxacin 250 MG tablet    citalopram 20 MG tablet                Primary Care Provider Office Phone # Fax #    AMADOU Smith [facility] [phone] [address] [facility] [address]        Equal Access to Services     [facility] AH: Hadii aad ku hadasho Soomaali, waaxda luqadaha, qaybta kaalmada adeegyada, rajesh morris. So [facility] [phone].    ATENCIÓN: Si habla español, tiene a flores disposición servicios gratuitos de asistencia lingüística. Marito al [phone].    We comply with applicable federal civil rights laws and [place] laws. We do not discriminate on the basis of race, color, national origin, age, disability, sex, sexual orientation, or gender identity.            Thank you!     Thank you for choosing [facility]  for your care. Our goal is always to provide you with  excellent care. Hearing back from our patients is one way we can continue to improve our services. Please take a few minutes to complete the written survey that you may receive in the mail after your visit with us. Thank you!             Your Updated Medication List - Protect others around you: Learn how to safely use, store and throw away your medicines at www.disposemymeds.org.          This list is accurate as of 3/14/18 10:10 AM.  Always use your most recent med list.                   Brand Name Dispense Instructions for use Diagnosis    ciprofloxacin 250 MG tablet    CIPRO    6 tablet    Take 1 tablet (250 mg) by mouth 2 times daily    Dysuria       citalopram 20 MG tablet    celeXA    90 tablet    TAKE ONE TABLET BY MOUTH ONCE DAILY    Major depressive disorder, recurrent episode, mild (H)       clobetasol propionate 0.05 % Sham     1 Bottle    Apply topically daily as needed Apply daily in the shower for 1 week, then once weekly as needed.  Leave in place for 15 minutes then add water, lather and rinse thoroughly.    Dermatitis, seborrheic       fluocinonide 0.05 % ointment    LIDEX    30 g    Apply twice daily for 2 weeks behind the ears.    Dermatitis, seborrheic       ibuprofen 600 MG tablet    ADVIL/MOTRIN    30 tablet    Take 1 tablet (600 mg) by mouth every 6 hours as needed for pain (mild)    Post-op pain       ketoconazole 2 % cream    NIZORAL    30 g    Apply topically daily Use twice daily on the external ear on non steroid days    Dermatitis, seborrheic       triamcinolone 0.1 % cream    KENALOG    45 g    For external ears use twice daily for 2 weeks, then twice daily weekends only.    Dermatitis, seborrheic       varenicline 0.5 MG X 11 & 1 MG X 42 tablet    CHANTIX STARTING MONTH KYLER    53 tablet    Take 0.5 mg tab daily for 3 days, then 0.5 mg tab twice daily for 4 days, then 1 mg twice daily.    Tobacco use disorder

## 2018-03-14 NOTE — PATIENT INSTRUCTIONS
To schedule your test or specialty referral please call:  [facility]:  Radiology (imaging- mammogram, ultrasound, CT, MRI): [phone]  Speciality consultation: [phone] [address]  [facility] [address]    cipro for bladder infection twice dialy x 3 days  Sending urine for culture  Call if yeast sx    Will let you know vaginal swab results

## 2018-03-15 LAB
BACTERIA SPEC CULT: ABNORMAL
Lab: ABNORMAL
SPECIMEN SOURCE: ABNORMAL

## 2018-03-15 ASSESSMENT — ANXIETY QUESTIONNAIRES: GAD7 TOTAL SCORE: 0

## 2018-03-15 ASSESSMENT — PATIENT HEALTH QUESTIONNAIRE - PHQ9: SUM OF ALL RESPONSES TO PHQ QUESTIONS 1-9: 0

## 2018-03-22 NOTE — TELEPHONE ENCOUNTER
Patient was seen on 03/14/2018.    Reminder letter sent for colon cancer screening    Lianne Blackwell

## 2018-03-23 ENCOUNTER — TELEPHONE (OUTPATIENT)
Dept: FAMILY MEDICINE | Facility: CLINIC | Age: 53
End: 2018-03-23

## 2018-03-23 DIAGNOSIS — Z12.11 SCREENING FOR COLON CANCER: Primary | ICD-10-CM

## 2018-03-23 NOTE — TELEPHONE ENCOUNTER
Panel Management Review      Patient has the following on her problem list:     Depression / Dysthymia review    Measure:  Needs PHQ-9 score of 4 or less during index window.  Administer PHQ-9 and if score is 5 or more, send encounter to provider for next steps.    5 - 7 month window range: 0    PHQ-9 SCORE 1/23/2017 8/15/2017 3/14/2018   Total Score - - -   Total Score MyChart - - 0   Total Score 0 0 0       If PHQ-9 recheck is 5 or more, route to provider for next steps.    Patient is due for:  None      Composite cancer screening  Chart review shows that this patient is due/due soon for the following Colonoscopy  Summary:    Patient is due/failing the following:   COLONOSCOPY and LDL    Action needed:   Patient needs fasting lab only appointment and Patient needs referral/order: colonoscopy    Type of outreach:    Sent letter.    Questions for provider review:    None                                                                                                                                    Stefani Roth CMA       Chart routed to Care Team .

## 2018-03-23 NOTE — LETTER
[facility]  [address]  [place] [address]  Phone: [phone]  Fax: [phone]  March 23, 2018      Genesis Adhikari  [address] [place] [address]      Dear Genesis,    We care about your health and have reviewed your health plan including your medical conditions, medications, and lab results.  Based on this review, it is recommended that you follow up regarding the following health topic(s):  -Cholesterol  -Colon Cancer Screening    We recommend you take the following action(s):  -schedule a LAB ONLY APPOINTMENT to recheck your: Lipids (fasting cholesterol - nothing to eat except water and/or meds for 8-10 hours) within the next 1-4 weeks.  If' you have had labs completed eslewhere, please let us know so we can update your records.    -schedule a COLONOSCOPY to look for colon cancer (due every 10 years or 5 years in higher risk situations.)  Colonoscopies can prevent 90-95% of colon cancer deaths.  Problem lesions can be removed before they ever become cancer.  If you do not wish to do a colonoscopy or cannot afford to do one at this time, there is another option called a Fecal Immunochemical Occult Blood Test (FIT) a take home stool sample kit.  It does not replace the colonoscopy for colorectal cancer screening, but it can detect hidden bleeding in the lower colon.  It does need to be repeated every year and if a positive result is obtained, you would be referred for a colonoscopy.  If you have completed either one of these tests at another facility, please have the records sent to our clinic for our records.     Please call us at the [facility] - [phone] (or use Te[employer]) to address the above recommendations.     Thank you for trusting [facility] and we appreciate the opportunity to serve you.  We look forward to supporting your healthcare needs in the future.    Healthy Regards,    Your Health Care Team  [facility] Services

## 2018-03-28 ENCOUNTER — TELEPHONE (OUTPATIENT)
Dept: FAMILY MEDICINE | Facility: CLINIC | Age: 53
End: 2018-03-28

## 2018-03-28 NOTE — LETTER
[facility]  [address]  [facility] [place] [address]  [phone]      March 28, 2018    Genesis Adhikari                                                                                                                     [address]  [facility] [address]        Dear Genesis,    We received a notice that you are to be scheduled with a specialty clinic. The referral has been placed by your provider and you can call to schedule an appointment directly.     Enclosed, you will find the referral with the phone number to call to schedule an appointment.  If you have already scheduled this, you may disregard this letter.    Please call us if you have any questions or concerns.      Sincerely,     [facility] Support Staff / jhon

## 2018-03-28 NOTE — TELEPHONE ENCOUNTER
You placed a referral for patient to dermatology on 3/13/18.  Patient has not scheduled as of yet.      Please review and forward to team if follow up with the patient is needed.     Thank you!  Evelina/Clinic Referrals Dyad II

## 2018-04-11 NOTE — TELEPHONE ENCOUNTER
Pt tried to schedule colonoscopy, but there is no order in place      Please place order and call pt when done

## 2018-04-11 NOTE — TELEPHONE ENCOUNTER
[facility], however, no referral has been placed. Patient would like a confirmation call once referral is showing in her chart. Thank you

## 2018-04-18 ENCOUNTER — RADIANT APPOINTMENT (OUTPATIENT)
Dept: MAMMOGRAPHY | Facility: CLINIC | Age: 53
End: 2018-04-18
Attending: PHYSICIAN ASSISTANT
Payer: COMMERCIAL

## 2018-04-18 DIAGNOSIS — Z12.39 BREAST CANCER SCREENING: ICD-10-CM

## 2018-04-18 PROCEDURE — 77067 SCR MAMMO BI INCL CAD: CPT | Performed by: STUDENT IN AN ORGANIZED HEALTH CARE EDUCATION/TRAINING PROGRAM

## 2018-05-22 ENCOUNTER — TELEPHONE (OUTPATIENT)
Dept: FAMILY MEDICINE | Facility: CLINIC | Age: 53
End: 2018-05-22

## 2018-05-22 NOTE — LETTER
[facility]  [address]  [place] [address]  Phone: [phone]  Fax: [phone]  May 22, 2018      Genesis Adhikari  [address] [place] [address]      Dear Genesis,    We care about your health and have reviewed your health plan including your medical conditions, medications, and lab results.  Based on this review, it is recommended that you follow up regarding the following health topic(s):  -Colon Cancer Screening  -Wellness (Physical) Visit     We recommend you take the following action(s):  -schedule a WELLNESS (Physical) APPOINTMENT.  We will perform the following labs: A1c, Lipids (fasting cholesterol - nothing to eat except water and/or meds for 8-10 hours), CMP(complete metabolic panel) and TSH (thyroid test).  -schedule a COLONOSCOPY to look for colon cancer (due every 10 years or 5 years in higher risk situations.)  Colonoscopies can prevent 90-95% of colon cancer deaths.  Problem lesions can be removed before they ever become cancer.  If you do not wish to do a colonoscopy or cannot afford to do one at this time, there is another option called a Fecal Immunochemical Occult Blood Test (FIT) a take home stool sample kit.  It does not replace the colonoscopy for colorectal cancer screening, but it can detect hidden bleeding in the lower colon.  It does need to be repeated every year and if a positive result is obtained, you would be referred for a colonoscopy.  If you have completed either one of these tests at another facility, please have the records sent to our clinic for our records.     Please call us at the [facility] - [phone] (or use [place] [employer]ation Document Preparation) to address the above recommendations.     Thank you for trusting [facility] and we appreciate the opportunity to serve you.  We look forward to supporting your healthcare needs in the future.    Healthy Regards,    Your Health Care Team  [facility] Services

## 2018-05-22 NOTE — TELEPHONE ENCOUNTER
Panel Management Review      Patient has the following on her problem list:     Depression / Dysthymia review    Measure:  Needs PHQ-9 score of 4 or less during index window.  Administer PHQ-9 and if score is 5 or more, send encounter to provider for next steps.    5 - 7 month window range: 0    PHQ-9 SCORE 1/23/2017 8/15/2017 3/14/2018   Total Score - - -   Total Score MyChart - - 0   Total Score 0 0 0       If PHQ-9 recheck is 5 or more, route to provider for next steps.    Patient is due for:  None      Composite cancer screening  Chart review shows that this patient is due/due soon for the following Colonoscopy  Summary:    Patient is due/failing the following:   COLONOSCOPY and PHYSICAL    Action needed:   Patient needs office visit for Physical.    Type of outreach:    Sent letter.    Questions for provider review:    None                                                                                                                                    Stefani Roth CMA       Chart routed to Care Team .

## 2018-06-11 ENCOUNTER — HOSPITAL ENCOUNTER (OUTPATIENT)
Facility: AMBULATORY SURGERY CENTER | Age: 53
Discharge: HOME | End: 2018-06-11
Attending: SURGERY | Admitting: SURGERY
Payer: COMMERCIAL

## 2018-06-11 ENCOUNTER — SURGERY (OUTPATIENT)
Age: 53
End: 2018-06-11

## 2018-06-11 VITALS
BODY MASS INDEX: 34.55 KG/M2 | TEMPERATURE: 98.7 F | HEIGHT: 66 IN | SYSTOLIC BLOOD PRESSURE: 111 MMHG | WEIGHT: 215 LBS | OXYGEN SATURATION: 95 % | DIASTOLIC BLOOD PRESSURE: 75 MMHG | RESPIRATION RATE: 20 BRPM

## 2018-06-11 LAB — COLONOSCOPY: NORMAL

## 2018-06-11 PROCEDURE — 45378 DIAGNOSTIC COLONOSCOPY: CPT

## 2018-06-11 PROCEDURE — 99153 MOD SED SAME PHYS/QHP EA: CPT | Performed by: SURGERY

## 2018-06-11 PROCEDURE — G8918 PT W/O PREOP ORDER IV AB PRO: HCPCS

## 2018-06-11 PROCEDURE — G8907 PT DOC NO EVENTS ON DISCHARG: HCPCS

## 2018-06-11 PROCEDURE — 99152 MOD SED SAME PHYS/QHP 5/>YRS: CPT | Performed by: SURGERY

## 2018-06-11 PROCEDURE — G0121 COLON CA SCRN NOT HI RSK IND: HCPCS | Performed by: SURGERY

## 2018-06-11 RX ORDER — LIDOCAINE 40 MG/G
CREAM TOPICAL
Status: DISCONTINUED | OUTPATIENT
Start: 2018-06-11 | End: 2018-06-12 | Stop reason: HOSPADM

## 2018-06-11 RX ORDER — FENTANYL CITRATE 50 UG/ML
INJECTION, SOLUTION INTRAMUSCULAR; INTRAVENOUS PRN
Status: DISCONTINUED | OUTPATIENT
Start: 2018-06-11 | End: 2018-06-11 | Stop reason: HOSPADM

## 2018-06-11 RX ADMIN — FENTANYL CITRATE 50 MCG: 50 INJECTION, SOLUTION INTRAMUSCULAR; INTRAVENOUS at 10:57

## 2018-06-11 RX ADMIN — FENTANYL CITRATE 100 MCG: 50 INJECTION, SOLUTION INTRAMUSCULAR; INTRAVENOUS at 10:54

## 2018-09-03 DIAGNOSIS — F33.0 MAJOR DEPRESSIVE DISORDER, RECURRENT EPISODE, MILD (H): ICD-10-CM

## 2018-09-04 RX ORDER — CITALOPRAM HYDROBROMIDE 20 MG/1
TABLET ORAL
Qty: 30 TABLET | Refills: 0 | Status: SHIPPED | OUTPATIENT
Start: 2018-09-04 | End: 2018-12-26

## 2018-09-04 NOTE — TELEPHONE ENCOUNTER
Pending Prescriptions:                       Disp   Refills    citalopram (CELEXA) 20 MG tablet [Pharmac*90 tab*1            Sig: TAKE ONE TABLET BY MOUTH ONCE DAILY    LOV 3/14/18  PHQ9 (3/14/18) score 0    Will be due for visit this month for 6 month follow up.  RN unable to approve as there is a drug-drug interaction (with ALEVE) when attempting to sign order. Will route for provider review.    Yan Jha RN, BSN

## 2018-09-14 ENCOUNTER — OFFICE VISIT (OUTPATIENT)
Dept: DERMATOLOGY | Facility: CLINIC | Age: 53
End: 2018-09-14
Payer: COMMERCIAL

## 2018-09-14 DIAGNOSIS — L81.4 SOLAR LENTIGINOSIS: ICD-10-CM

## 2018-09-14 DIAGNOSIS — D22.9 MULTIPLE BENIGN NEVI: Primary | ICD-10-CM

## 2018-09-14 DIAGNOSIS — Z85.828 HISTORY OF NONMELANOMA SKIN CANCER: ICD-10-CM

## 2018-09-14 DIAGNOSIS — L21.9 DERMATITIS, SEBORRHEIC: ICD-10-CM

## 2018-09-14 PROCEDURE — 99213 OFFICE O/P EST LOW 20 MIN: CPT | Performed by: DERMATOLOGY

## 2018-09-14 RX ORDER — FLUOCINONIDE 0.5 MG/G
OINTMENT TOPICAL
Qty: 30 G | Refills: 0 | Status: SHIPPED | OUTPATIENT
Start: 2018-09-14 | End: 2020-10-09

## 2018-09-14 RX ORDER — CLOBETASOL PROPIONATE 0.05 G/100ML
SHAMPOO TOPICAL DAILY PRN
Qty: 1 BOTTLE | Refills: 5 | Status: CANCELLED | OUTPATIENT
Start: 2018-09-14

## 2018-09-14 RX ORDER — KETOCONAZOLE 20 MG/G
CREAM TOPICAL DAILY
Qty: 30 G | Refills: 11 | Status: SHIPPED | OUTPATIENT
Start: 2018-09-14 | End: 2020-10-09

## 2018-09-14 RX ORDER — CLOBETASOL PROPIONATE 0.5 MG/ML
SOLUTION TOPICAL
Qty: 50 ML | Refills: 6 | Status: SHIPPED | OUTPATIENT
Start: 2018-09-14 | End: 2020-10-09

## 2018-09-14 RX ORDER — TRIAMCINOLONE ACETONIDE 1 MG/G
CREAM TOPICAL
Qty: 45 G | Refills: 1 | Status: SHIPPED | OUTPATIENT
Start: 2018-09-14 | End: 2020-10-09

## 2018-09-14 NOTE — PROGRESS NOTES
[facility] Dermatology Note      Dermatology Problem List:  1. History of SCC, right forearm  -per patient report  2. Seborrheic keratosis, scalp and ears  -s/p lidex ointment (postauricular) initiated 1/25/2016  -s/p triamcinolone (external ears) initiated 1/25/2016  -s/p ketoconazole cream (ear) initiated 10/20/2015  -s/p Clobetasol shampoo (scalp) initiated 10/20/2015  -s/p Head & Shoulders, Neutrogenia T-Sal initiated 10/20/2015      Encounter Date: Sep 14, 2018    CC:  Chief Complaint   Patient presents with     Derm Problem     skin check      Medication Refill     all 4 meds previous ordered          History of Present Illness:  This 53 year old female presents as a follow up for history of NMSC.The patient was last seen 7/25/16 at which time her seborrheic dermatitis was clear. Today, the patient reports that she has had intermittent flares, and that it has spread inside her ear. She was concerned about skin thinning and wanted to inquire if she can apply her topicals. She thinks that there is a buildup of flakes inside the ear, too, and wonders how she should clean out the ear. Other than that, she has no major concerns to address today.     Past Medical History:   Past Medical History:   Diagnosis Date     NO ACTIVE PROBLEMS      Past Surgical History:   Procedure Laterality Date     AS ABLATION, ENDOMETRIAL, THERMAL, W/O HYSTEROSCOPIC GUIDANCE  3/21/11    uterine ablation     C LIGATE FALLOPIAN TUBE,POSTPARTUM  08/07/2002     COLONOSCOPY WITH CO2 INSUFFLATION N/A 6/11/2018    Procedure: COLONOSCOPY WITH CO2 INSUFFLATION;  Screening for colon cancer  BMI: 34.44  Pharm: [facility]  [phone]  Referring: Dr. Taylor  Inc: Commercial  Resent RX to pharmacy [facility] ;  Surgeon: Papa Bajwa MD;  Location: [facility] OR     DILATION AND CURETTAGE, HYSTEROSCOPY DIAGNOSTIC, COMBINED N/A 03/16/2017    Thickened endometrial lining     DILATION AND CURETTAGE, HYSTEROSCOPY DIAGNOSTIC, COMBINED N/A  3/16/2017    Procedure: COMBINED DILATION AND CURETTAGE, HYSTEROSCOPY DIAGNOSTIC;  Surgeon: Socorro Ford DO;  Location: [facility] OR     HC TOOTH EXTRACTION W/FORCEP       HYSTEROSCOPY  3/21/11     lap band  3/23/13       Social History:  Works outside as .     Family History:  Mom has had SCC    Medications:  Current Outpatient Prescriptions   Medication Sig Dispense Refill     citalopram (CELEXA) 20 MG tablet TAKE ONE TABLET BY MOUTH ONCE DAILY 30 tablet 0     clobetasol propionate 0.05 % SHAM Apply topically daily as needed Apply daily in the shower for 1 week, then once weekly as needed.  Leave in place for 15 minutes then add water, lather and rinse thoroughly. 1 Bottle 5     fluocinonide (LIDEX) 0.05 % ointment Apply twice daily for 2 weeks behind the ears. 30 g 0     ketoconazole (NIZORAL) 2 % cream Apply topically daily Use twice daily on the external ear on non steroid days 30 g 11     Naproxen Sodium (ALEVE PO)        triamcinolone (KENALOG) 0.1 % cream For external ears use twice daily for 2 weeks, then twice daily weekends only. 45 g 1       Allergies   Allergen Reactions     No Known Drug Allergies          Review of Systems:  -Const: Denies fevers or chills. Is otherwise feeling well.   -Skin: As above in HPI. No additional skin concerns.    Physical exam:  There were no vitals taken for this visit.  GEN: This is a well developed, well-nourished female in no acute distress, in a pleasant mood.    SKIN: Total skin excluding the undergarment areas was performed. The exam included the head/face, neck, both arms, chest, back, abdomen, both legs, digits and/or nails.   -There is a well healed surgical scar without erythema, nodularity or telangiectasias on the right forearm.   - Scattered brown macules on sun exposed areas.  - Multiple regular brown pigmented macules and papules are identified on the trunk and extremities.   - There are dome shaped bright red papules on the trunk and  extremities.   -No other lesions of concern on areas examined.     Impression/Plan:  1. History of nonmelanoma skin cancer- no evidence of recurrence.     2. Solar lentigines, cherry angiomas, and multiple benign nevi.   Sun precaution was advised including the use of sun screens of SPF 30 or higher, sun protective clothing, and avoidance of tanning beds.    3. Seborrheic dermatitis - patient reports a build up of flakes within the ears-not active today. For hearing recommend she see PCP and consider ear clearning    Start clobetasol 0.05% solution. Apply twice daily for up to 2 weeks, weekends only for 2 weeks.     Advised patient that she can follow up with PCP or ENT to clean out her ears.   Reviewed risks of atrophy with steroid overuse.   Follow-up in 1 year for seborrheic dermatitis, earlier pending biopsy, earlier for new or changing lesions.       Staff Involved:  Scribe/Staff    Scribe Disclosure  I, aMtt Benitez, am serving as a scribe to document services personally performed by Dr. Destiny Welch MD, based on data collection and the provider's statements to me.       Provider Disclosure:   The documentation recorded by the scribe accurately reflects the services I personally performed and the decisions made by me.    Destiny Welch MD    Department of Dermatology  [facility]: Phone: [phone], Fax:[phone]  [facility]: Phone: [phone], Fax: [phone]

## 2018-09-14 NOTE — LETTER
9/14/2018         RE: Genesis Adhikari  [address] [facility] [address]        Dear Colleague,    Thank you for referring your patient, Genesis Adhikari, to the [facility]. Please see a copy of my visit note below.    [facility] Dermatology Note      Dermatology Problem List:  1. History of SCC, right forearm  -per patient report  2. Seborrheic keratosis, scalp and ears  -s/p lidex ointment (postauricular) initiated 1/25/2016  -s/p triamcinolone (external ears) initiated 1/25/2016  -s/p ketoconazole cream (ear) initiated 10/20/2015  -s/p Clobetasol shampoo (scalp) initiated 10/20/2015  -s/p Head & Shoulders, Neutrogenia T-Sal initiated 10/20/2015      Encounter Date: Sep 14, 2018    CC:  Chief Complaint   Patient presents with     Derm Problem     skin check      Medication Refill     all 4 meds previous ordered          History of Present Illness:  This 53 year old female presents as a follow up for history of NMSC.The patient was last seen 7/25/16 at which time her seborrheic dermatitis was clear. Today, the patient reports that she has had intermittent flares, and that it has spread inside her ear. She was concerned about skin thinning and wanted to inquire if she can apply her topicals. She thinks that there is a buildup of flakes inside the ear, too, and wonders how she should clean out the ear. Other than that, she has no major concerns to address today.     Past Medical History:   Past Medical History:   Diagnosis Date     NO ACTIVE PROBLEMS      Past Surgical History:   Procedure Laterality Date     AS ABLATION, ENDOMETRIAL, THERMAL, W/O HYSTEROSCOPIC GUIDANCE  3/21/11    uterine ablation     C LIGATE FALLOPIAN TUBE,POSTPARTUM  08/07/2002     COLONOSCOPY WITH CO2 INSUFFLATION N/A 6/11/2018    Procedure: COLONOSCOPY WITH CO2 INSUFFLATION;  Screening for colon cancer  BMI: 34.44  Pharm: [facility]  [phone]  Referring: Dr. Taylor  Inc: Commercial  Resent  RX to pharmacy [facility] ;  Surgeon: Papa Bajwa MD;  Location: [facility] OR     DILATION AND CURETTAGE, HYSTEROSCOPY DIAGNOSTIC, COMBINED N/A 03/16/2017    Thickened endometrial lining     DILATION AND CURETTAGE, HYSTEROSCOPY DIAGNOSTIC, COMBINED N/A 3/16/2017    Procedure: COMBINED DILATION AND CURETTAGE, HYSTEROSCOPY DIAGNOSTIC;  Surgeon: Socorro Ford DO;  Location: [facility] OR     HC TOOTH EXTRACTION W/FORCEP       HYSTEROSCOPY  3/21/11     lap band  3/23/13       Social History:  Works outside as .     Family History:  Mom has had SCC    Medications:  Current Outpatient Prescriptions   Medication Sig Dispense Refill     citalopram (CELEXA) 20 MG tablet TAKE ONE TABLET BY MOUTH ONCE DAILY 30 tablet 0     clobetasol propionate 0.05 % SHAM Apply topically daily as needed Apply daily in the shower for 1 week, then once weekly as needed.  Leave in place for 15 minutes then add water, lather and rinse thoroughly. 1 Bottle 5     fluocinonide (LIDEX) 0.05 % ointment Apply twice daily for 2 weeks behind the ears. 30 g 0     ketoconazole (NIZORAL) 2 % cream Apply topically daily Use twice daily on the external ear on non steroid days 30 g 11     Naproxen Sodium (ALEVE PO)        triamcinolone (KENALOG) 0.1 % cream For external ears use twice daily for 2 weeks, then twice daily weekends only. 45 g 1       Allergies   Allergen Reactions     No Known Drug Allergies          Review of Systems:  -Const: Denies fevers or chills. Is otherwise feeling well.   -Skin: As above in HPI. No additional skin concerns.    Physical exam:  There were no vitals taken for this visit.  GEN: This is a well developed, well-nourished female in no acute distress, in a pleasant mood.    SKIN: Total skin excluding the undergarment areas was performed. The exam included the head/face, neck, both arms, chest, back, abdomen, both legs, digits and/or nails.   -There is a well healed surgical scar without erythema, nodularity or  telangiectasias on the right forearm.   - Scattered brown macules on sun exposed areas.  - Multiple regular brown pigmented macules and papules are identified on the trunk and extremities.   - There are dome shaped bright red papules on the trunk and extremities.   -No other lesions of concern on areas examined.     Impression/Plan:  1. History of nonmelanoma skin cancer- no evidence of recurrence.     2. Solar lentigines, cherry angiomas, and multiple benign nevi.   Sun precaution was advised including the use of sun screens of SPF 30 or higher, sun protective clothing, and avoidance of tanning beds.    3. Seborrheic dermatitis - patient reports a build up of flakes within the ears-not active today. For hearing recommend she see PCP and consider ear clearning    Start clobetasol 0.05% solution. Apply twice daily for up to 2 weeks, weekends only for 2 weeks.     Advised patient that she can follow up with PCP or ENT to clean out her ears.   Reviewed risks of atrophy with steroid overuse.   Follow-up in 1 year for seborrheic dermatitis, earlier pending biopsy, earlier for new or changing lesions.       Staff Involved:  Scribe/Staff    Scribe Disclosure  I, Matt Benitez, am serving as a scribe to document services personally performed by Dr. Destiny Welch MD, based on data collection and the provider's statements to me.       Provider Disclosure:   The documentation recorded by the scribe accurately reflects the services I personally performed and the decisions made by me.    Destiny Welch MD    Department of Dermatology  [facility]: Phone: [phone], Fax:[phone]  [facility]: Phone: [phone], Fax: [phone]        Again, thank you for allowing me to participate in the care of your patient.        Sincerely,        Destiny Welch MD

## 2018-09-14 NOTE — NURSING NOTE
Genesis Adhikari's goals for this visit include:   Chief Complaint   Patient presents with     Derm Problem     skin check        She requests these members of her care team be copied on today's visit information: yes    PCP: Joana Taylor    Referring Provider:  No referring provider defined for this encounter.    There were no vitals taken for this visit.    Do you need any medication refills at today's visit? No     Amorrae LORIN Izquierdo

## 2018-09-14 NOTE — MR AVS SNAPSHOT
After Visit Summary   2018    Genesis Adhikari    MRN: [id number]           Patient Information     Date Of Birth          1965        Visit Information        Provider Department      2018 1:30 PM Destiny Welch MD [facility]        Today's Diagnoses     Multiple benign nevi    -  1    Dermatitis, seborrheic        Solar lentiginosis        History of nonmelanoma skin cancer           Follow-ups after your visit        Follow-up notes from your care team     Return in about 1 year (around 2019) for seborrheic dermatitis.      Who to contact     If you have questions or need follow up information about today's clinic visit or your schedule please contact [facility] directly at [phone].  Normal or non-critical lab and imaging results will be communicated to you by Bioaxialhart, letter or phone within 4 business days after the clinic has received the results. If you do not hear from us within 7 days, please contact the clinic through MyChart or phone. If you have a critical or abnormal lab result, we will notify you by phone as soon as possible.  Submit refill requests through Unicon or call your pharmacy and they will forward the refill request to us. Please allow 3 business days for your refill to be completed.          Additional Information About Your Visit        MyChart Information     Unicon is an electronic gateway that provides easy, online access to your medical records. With Unicon, you can request a clinic appointment, read your test results, renew a prescription or communicate with your care team.     To sign up for Unicon visit the website at [email or web address]   You will be asked to enter the access code listed below, as well as some personal information. Please follow the directions to create your username and password.     Your access code is: [email or web address]  Expires: 2018  2:20 PM     Your access code will  in  90 days. If you need help or a new code, please contact your [facility] or call [phone] for assistance.        Care EveryWhere ID     This is your Care EveryWhere ID. This could be used by other organizations to access your [place] medical records  [id number]         Blood Pressure from Last 3 Encounters:   06/11/18 111/75   03/14/18 122/70   04/11/17 121/73    Weight from Last 3 Encounters:   06/04/18 97.5 kg (215 lb)   03/14/18 93.7 kg (206 lb 8 oz)   04/11/17 93.9 kg (207 lb)              Today, you had the following     No orders found for display         Today's Medication Changes          These changes are accurate as of 9/14/18  2:20 PM.  If you have any questions, ask your nurse or doctor.               These medicines have changed or have updated prescriptions.        Dose/Directions    * clobetasol propionate 0.05 % Sham   This may have changed:  Another medication with the same name was added. Make sure you understand how and when to take each.   Used for:  Dermatitis, seborrheic        Apply topically daily as needed Apply daily in the shower for 1 week, then once weekly as needed.  Leave in place for 15 minutes then add water, lather and rinse thoroughly.   Quantity:  1 Bottle   Refills:  5       * clobetasol 0.05 % external solution   Commonly known as:  TEMOVATE   This may have changed:  You were already taking a medication with the same name, and this prescription was added. Make sure you understand how and when to take each.   Used for:  Dermatitis, seborrheic        Use twice daily for up to 2 weeks in a row for flares, take 2 weeks off then repeat   Quantity:  50 mL   Refills:  6       * Notice:  This list has 2 medication(s) that are the same as other medications prescribed for you. Read the directions carefully, and ask your doctor or other care provider to review them with you.         Where to get your medicines      These medications were sent to [facility]  [address] [address] [facility]  [address] [facility], [place] [address]     Phone:  [phone]     clobetasol 0.05 % external solution    fluocinonide 0.05 % ointment    ketoconazole 2 % cream    triamcinolone 0.1 % cream                Primary Care Provider Office Phone # Fax #    Joana Taylor, APRN [facility] [phone] [address] [facility] [address]        Equal Access to Services     [facility] Hospital[facility] : Hadii aad ku hadasho Soomaali, waaxda luqadaha, qaybta kaalmada adeegyada, waxay idiin hayaan adeeg malachi mcmullen . So [facility] [phone].    ATENCIÓN: Si habla español, tiene a flores disposición servicios gratuitos de asistencia lingüística. [facility] [phone].    We comply with applicable federal civil rights laws and [place] laws. We do not discriminate on the basis of race, color, national origin, age, disability, sex, sexual orientation, or gender identity.            Thank you!     Thank you for choosing [facility]  for your care. Our goal is always to provide you with excellent care. Hearing back from our patients is one way we can continue to improve our services. Please take a few minutes to complete the written survey that you may receive in the mail after your visit with us. Thank you!             Your Updated Medication List - Protect others around you: Learn how to safely use, store and throw away your medicines at www.disposemymeds.org.          This list is accurate as of 9/14/18  2:20 PM.  Always use your most recent med list.                   Brand Name Dispense Instructions for use Diagnosis    ALEVE PO           citalopram 20 MG tablet    celeXA    30 tablet    TAKE ONE TABLET BY MOUTH ONCE DAILY    Major depressive disorder, recurrent episode, mild (H)       * clobetasol propionate 0.05 % Sham     1 Bottle    Apply topically daily as needed Apply daily in the shower for 1 week, then once weekly as needed.  Leave in place for 15 minutes then  add water, lather and rinse thoroughly.    Dermatitis, seborrheic       * clobetasol 0.05 % external solution    TEMOVATE    50 mL    Use twice daily for up to 2 weeks in a row for flares, take 2 weeks off then repeat    Dermatitis, seborrheic       fluocinonide 0.05 % ointment    LIDEX    30 g    Apply twice daily for 2 weeks behind the ears.    Dermatitis, seborrheic       ketoconazole 2 % cream    NIZORAL    30 g    Apply topically daily Use twice daily on the external ear on non steroid days    Dermatitis, seborrheic       triamcinolone 0.1 % cream    KENALOG    45 g    For external ears use twice daily for 2 weeks, then twice daily weekends only.    Dermatitis, seborrheic       * Notice:  This list has 2 medication(s) that are the same as other medications prescribed for you. Read the directions carefully, and ask your doctor or other care provider to review them with you.

## 2018-09-24 ENCOUNTER — TELEPHONE (OUTPATIENT)
Dept: FAMILY MEDICINE | Facility: CLINIC | Age: 53
End: 2018-09-24

## 2018-09-24 NOTE — TELEPHONE ENCOUNTER
Panel Management Review      Patient has the following on her problem list:     Depression / Dysthymia review    Measure:  Needs PHQ-9 score of 4 or less during index window.  Administer PHQ-9 and if score is 5 or more, send encounter to provider for next steps.        PHQ-9 SCORE 1/23/2017 8/15/2017 3/14/2018   Total Score - - -   Total Score MyChart - - 0   Total Score 0 0 0       If PHQ-9 recheck is 5 or more, route to provider for next steps.    Patient is due for:  PHQ9      Composite cancer screening  Chart review shows that this patient is due/due soon for the following None  Summary:    Patient is due/failing the following:   Lipid, UA and PHQ9    Action needed:   Patient needs to do PHQ9. and Patient needs non-fasting lab only appointment    Type of outreach:    Phone, left message for patient to call back.     Questions for provider review:    None                                                                                                                                    Mayra Latham MA        Chart routed to Care Team .

## 2018-12-20 NOTE — PROGRESS NOTES
"  SUBJECTIVE:   Genesis Adhikari is a 53 year old female who presents to clinic today for the following health issues:      HPI   Mood- depression.   Needs refill on celexa.  On same dose for years. Feels very stable w/sx control.   No side effects. Just needs refills.   No self harm thoughts.   Mood is good.   and busy holiday season.   PHQ-9 SCORE 1/23/2017 8/15/2017 3/14/2018   PHQ-9 Total Score - - -   PHQ-9 Total Score MyChart - - 0   PHQ-9 Total Score 0 0 0     CHELSY-7 SCORE 1/23/2017 8/15/2017 3/14/2018   Total Score - - -   Total Score - - 0 (minimal anxiety)   Total Score 0 3 0         Back Pain - low back    Duration: Whole month of December. Patient states she gets back flare ups every couple of years.   \"everywhere\" different places at different times. Reaches for something and feels it.   Has had back problems on and off over the years.  Thinks aggravated by increased work holiday season. Jan and Feb tends to flare up after busy season is over.  Takes muscle relaxer now and again.   \"back goes out\" sometimes. Did PT a long time ago- 3-4 yr ago. It helped.   No radiation to the leg, no N/T, bowel/bladder. Shooting pain that is quick/fleeting down back of leg with certain positions now and again.        Specific cause: poss work related , patient is a      Description:   Location of pain: whole back   Character of pain: shooting back pain randomly on and off   Pain radiation:none  New numbness or weakness in legs, not attributed to pain:  no     Intensity: mild to severe     History:   Pain interferes with job: YES  History of back problems:   Any previous MRI or X-rays: None  Sees a specialist for back pain:  No  Therapies tried without relief: none    Alleviating factors:   Improved by: resting , ibuprofen       Precipitating factors:  Worsened by: depending on movement        Accompanying Signs & Symptoms:  Risk of Fracture:  None  Risk of Cauda Equina:  None  Risk of Infection:  " None  Risk of Cancer:  None  Risk of Ankylosing Spondylitis:  Onset at age <35, male, AND morning back stiffness. no     Problem list and histories reviewed & adjusted, as indicated.  Additional history: as documented      Patient Active Problem List   Diagnosis     Vaginitis and vulvovaginitis     Depressive disorder, not elsewhere classified     Other, mixed, or unspecified nondependent drug abuse, in remission     CARDIOVASCULAR SCREENING; LDL GOAL LESS THAN 160     Major depressive disorder, recurrent episode, mild (H)     Obesity     H/O laparoscopic adjustable gastric banding     Seborrheic dermatitis of scalp     Alcohol use     Uterine enlargement     Cyst of uterus     Thickened endometrium     S/P endometrial ablation     Polyp of corpus uteri     Past Surgical History:   Procedure Laterality Date     AS ABLATION, ENDOMETRIAL, THERMAL, W/O HYSTEROSCOPIC GUIDANCE  3/21/11    uterine ablation     C LIGATE FALLOPIAN TUBE,POSTPARTUM  2002     COLONOSCOPY WITH CO2 INSUFFLATION N/A 2018    Procedure: COLONOSCOPY WITH CO2 INSUFFLATION;  Screening for colon cancer  BMI: 34.44  [facility]: [facility]  [phone]  Referring: Dr. Taylor  Inc: Commercial  [facility]ent [facility] to [facility] ;  Surgeon: Papa Bajwa MD;  Location: [facility] OR     DILATION AND CURETTAGE, HYSTEROSCOPY DIAGNOSTIC, COMBINED N/A 2017    Thickened endometrial lining     DILATION AND CURETTAGE, HYSTEROSCOPY DIAGNOSTIC, COMBINED N/A 3/16/2017    Procedure: COMBINED DILATION AND CURETTAGE, HYSTEROSCOPY DIAGNOSTIC;  Surgeon: Socorro Ford DO;  Location: [facility] OR     HC TOOTH EXTRACTION W/FORCEP       HYSTEROSCOPY  3/21/11     lap band  3/23/13       Social History     Tobacco Use     Smoking status: Former Smoker     Packs/day: 0.50     Years: 25.00     Pack years: 12.50     Types: Cigarettes     Last attempt to quit: 2018     Years since quittin.8     Smokeless tobacco: Never Used     Tobacco comment: quit 2018     Substance Use Topics     Alcohol use: Yes     Alcohol/week: 6.0 oz     Comment: 4-5 per week      Family History   Problem Relation Age of Onset     Cancer Father         liver and stomach     Heart Disease Maternal Grandfather          of heart problems     Cancer Maternal Grandmother         breast cancer     Diabetes No family hx of      Hypertension No family hx of      Hyperlipidemia No family hx of      Colon Cancer No family hx of      Anxiety Disorder No family hx of      Substance Abuse No family hx of      Asthma No family hx of      Genetic Disorder No family hx of      Osteoporosis No family hx of      Anesthesia Reaction No family hx of      Mental Illness No family hx of      Depression No family hx of      Other Cancer No family hx of      Prostate Cancer No family hx of      Breast Cancer No family hx of      Cerebrovascular Disease No family hx of      Coronary Artery Disease No family hx of          Current Outpatient Medications   Medication Sig Dispense Refill     citalopram (CELEXA) 20 MG tablet Take 1 tablet (20 mg) by mouth daily 90 tablet 3     clobetasol (TEMOVATE) 0.05 % external solution Use twice daily for up to 2 weeks in a row for flares, take 2 weeks off then repeat 50 mL 6     clobetasol propionate 0.05 % SHAM Apply topically daily as needed Apply daily in the shower for 1 week, then once weekly as needed.  Leave in place for 15 minutes then add water, lather and rinse thoroughly. 1 Bottle 5     fluocinonide (LIDEX) 0.05 % ointment Apply twice daily for 2 weeks behind the ears. 30 g 0     ketoconazole (NIZORAL) 2 % cream Apply topically daily Use twice daily on the external ear on non steroid days 30 g 11     methocarbamol (ROBAXIN) 750 MG tablet Take 1 tablet (750 mg) by mouth 4 times daily as needed for muscle spasms 30 tablet 1     Naproxen Sodium (ALEVE PO)        triamcinolone (KENALOG) 0.1 % cream For external ears use twice daily for 2 weeks, then twice daily weekends  "only. 45 g 1     Allergies   Allergen Reactions     No Known Drug Allergies      BP Readings from Last 3 Encounters:   12/26/18 120/72   06/11/18 111/75   03/14/18 122/70    Wt Readings from Last 3 Encounters:   12/26/18 101.6 kg (224 lb)   06/04/18 97.5 kg (215 lb)   03/14/18 93.7 kg (206 lb 8 oz)                  Labs reviewed in EPIC    ROS:  Constitutional, HEENT, cardiovascular, pulmonary, gi and gu systems are negative, except as otherwise noted.    OBJECTIVE:     /72   Pulse 85   Temp 98.1  F (36.7  C) (Oral)   Resp 14   Ht 1.676 m (5' 6\")   Wt 101.6 kg (224 lb)   SpO2 96%   Breastfeeding? No   BMI 36.15 kg/m    Body mass index is 36.15 kg/m .  GENERAL: healthy, alert and no distress  MS: Lumbar Spine: No skin changes noted. Tender with palpation of lumbar muscles.  Non-tender into gluteal area and with pressure along posterior thigh. Not TTP into calf muscle.  Has normal forward bending to touch the ground; normal twisting, lateral bending of back. No pain with back extension.  Is able to walk on heels and toes without difficulty.  Normal gait.   Strength 5/5 with leg extension, ankle ROM, and great toe flex/ext.  Straight leg raise normal. No edema. Pulses normal. Patellar reflexes 2+ and symmetric. Achilles reflexes 1-2+ symmetric. Sensation intact to light touch of LE.   PSYCH: mentation appears normal, affect normal/bright, neatly groomed, casually dressed. No SI/HI, no agitation.     Diagnostic Test Results:  none     ASSESSMENT/PLAN:     1. Major depressive disorder, recurrent episode, mild (H)  Stable on her dose for years, tolerates, no side effects. Refilled.   - citalopram (CELEXA) 20 MG tablet; Take 1 tablet (20 mg) by mouth daily  Dispense: 90 tablet; Refill: 3    2. Bilateral low back pain without sciatica, unspecified chronicity  Referral to PT- chronic with periodic flare ups- discussed consistent home exercises to prevent flares.   physical job likely a factor.  Ice, heat, " stretching.  Occasional use of robaxin. Counseled on: Do not drink alcohol when taking, do not drive. This was also given in AVS.  - [facility]; Future  - methocarbamol (ROBAXIN) 750 MG tablet; Take 1 tablet (750 mg) by mouth 4 times daily as needed for muscle spasms  Dispense: 30 tablet; Refill: 1      Follow Up: pt overdue for annual with PCP. advised to schedule within next few months.  The patient was instructed to contact clinic for worsening symptoms, non-improvement as expected/discussed, and for questions regarding medications or treatment plan. Discussed parameters for follow up and included in After Visit Summary given to patient.      Yanique Jasso PA-C  [facility]

## 2018-12-26 ENCOUNTER — OFFICE VISIT (OUTPATIENT)
Dept: FAMILY MEDICINE | Facility: CLINIC | Age: 53
End: 2018-12-26
Payer: COMMERCIAL

## 2018-12-26 VITALS
HEART RATE: 85 BPM | SYSTOLIC BLOOD PRESSURE: 120 MMHG | TEMPERATURE: 98.1 F | DIASTOLIC BLOOD PRESSURE: 72 MMHG | HEIGHT: 66 IN | RESPIRATION RATE: 14 BRPM | WEIGHT: 224 LBS | BODY MASS INDEX: 36 KG/M2 | OXYGEN SATURATION: 96 %

## 2018-12-26 DIAGNOSIS — F33.0 MAJOR DEPRESSIVE DISORDER, RECURRENT EPISODE, MILD (H): Primary | ICD-10-CM

## 2018-12-26 DIAGNOSIS — M54.50 BILATERAL LOW BACK PAIN WITHOUT SCIATICA, UNSPECIFIED CHRONICITY: ICD-10-CM

## 2018-12-26 PROCEDURE — 99214 OFFICE O/P EST MOD 30 MIN: CPT | Performed by: PHYSICIAN ASSISTANT

## 2018-12-26 RX ORDER — METHOCARBAMOL 750 MG/1
750 TABLET, FILM COATED ORAL 4 TIMES DAILY PRN
Qty: 30 TABLET | Refills: 1 | Status: SHIPPED | OUTPATIENT
Start: 2018-12-26 | End: 2019-01-05

## 2018-12-26 RX ORDER — CITALOPRAM HYDROBROMIDE 20 MG/1
20 TABLET ORAL DAILY
Qty: 90 TABLET | Refills: 3 | Status: SHIPPED | OUTPATIENT
Start: 2018-12-26 | End: 2019-12-09

## 2018-12-26 ASSESSMENT — ANXIETY QUESTIONNAIRES

## 2018-12-26 ASSESSMENT — PATIENT HEALTH QUESTIONNAIRE - PHQ9
SUM OF ALL RESPONSES TO PHQ QUESTIONS 1-9: 1
5. POOR APPETITE OR OVEREATING: NOT AT ALL

## 2018-12-26 ASSESSMENT — MIFFLIN-ST. JEOR: SCORE: 1632.81

## 2018-12-26 NOTE — PATIENT INSTRUCTIONS
Refilled the celexa for 1 yr.    Methocarbamol for muscles in the back.  You were prescribed a muscle relaxer. This can make you dizzy and/or drowsy.   Do NOT drink alcohol while taking.   Try for the first time at home (ie before bed) so you know how it affects you.   Do not drive while taking if you feel dizzy or drowsy.     Physical Therapy:  [facility]  [phone]    When You Have Low Back Pain    Caring for Your Back  You are not alone.    Low back pain is very common. Nearly half of all adults have low back pain in any given year. The good news is that back pain is rarely a danger to your health. Most people can manage their back pain on their own and about half of them start feeling better within 2 weeks. In 9 out of 10 cases, low back pain goes away or no longer limits daily activity within 6 weeks.     Your outlook is good!    Your symptoms tell us that your low back pain is most likely not a danger to you. Most of the time we do not know the exact cause of low back pain, even if you see a doctor or have an MRI. However, treatment can still work without knowing the cause of the pain. Less than 1 in 100 people need surgery for their back pain.     What can I do about my low back pain?     There are three things you can do to ease low back pain and help it go away.    Use heat or cold packs.    Take medicine as directed.    Use positions, movements and exercises.     Using heat or cold packs    Try cold packs or gentle heat to ease your pain. Use whichever gives you the most relief. Apply the cold pack or heat for 15 minutes at a time, as often as needed.    Taking medicine      If your doctor has prescribed medicine, be sure to follow the directions.    If you take over-the-counter medicine, read and follow the directions.    Talk to your doctor if you have any questions.     Using positions, movements and exercises    Research tells us that moving your joints and muscles can help you  recover from back pain. Such activity should be simple and gentle. Use the positions in the photos as well as walking to help relieve your pain. Try taking a short walk every 3 to 4 hours during the day. Walk for a few minutes inside your home or take longer walks outside, on a treadmill or at a mall. Slowly increase the amount of time you walk. Expect discomfort when you begin, but it should lessen as your back starts to heal. When your back feels better, walk daily to keep your back and body healthy.    Finding a comfortable position    When your back pain is new, certain positions will ease your pain. Gently try each of the positions below until you find one that is helpful. Once you find a position of comfort, use it as often as you like when you are resting. You will recover faster if you combine rest with activity.         Lie on your back with your legs bent. You can do this by placing a pillow under your knees. Or you may lie on the floor and rest your lower legs on the seat of a chair.       Lie on your side with your knees bent, and place a pillow between your knees.       Lie on your stomach over pillows.      When should I call my doctor?    Your back pain should improve over the first couple of weeks. As it improves, you should be able to return to your normal activities. But call your doctor if:    You have a sudden change in your ability to control your bladder or bowels.    You feel tingling in your groin or legs.    The pain spreads down your leg and into your foot.    Your toes, feet or leg muscles feel weak.    You feel generally unwell or sick.    Your pain does not get better or gets worse.      If you are deaf or hard of hearing, please let us know. We provide many free services including sign language interpreters,oral interpreters, TTYs, telephone amplifiers, note takers and written materials.    For informational purposes only. Not to replace the advice of your health care provider.  Copyright   2013 [employer]. All rights reserved. [employer] 468746 - Rev 06/14.

## 2018-12-27 ASSESSMENT — ANXIETY QUESTIONNAIRES: GAD7 TOTAL SCORE: 0

## 2019-03-18 ENCOUNTER — TELEPHONE (OUTPATIENT)
Dept: FAMILY MEDICINE | Facility: CLINIC | Age: 54
End: 2019-03-18

## 2019-03-18 NOTE — TELEPHONE ENCOUNTER
Left message for pt. Please help pt schedule a physical appointment with fasting lab.   Panel Management Review      Patient has the following on her problem list:     Depression / Dysthymia review    Measure:  Needs PHQ-9 score of 4 or less during index window.  Administer PHQ-9 and if score is 5 or more, send encounter to provider for next steps.    5 - 7 month window range:     PHQ-9 SCORE 8/15/2017 3/14/2018 12/26/2018   PHQ-9 Total Score - - -   PHQ-9 Total Score MyChart - 0 -   PHQ-9 Total Score 0 0 1       If PHQ-9 recheck is 5 or more, route to provider for next steps.    Patient is due for:  None      Composite cancer screening  Chart review shows that this patient is due/due soon for the following None  Summary:    Patient is due/failing the following:   LDL and PHYSICAL    Action needed:   Patient needs office visit for physical. and Patient needs fasting lab only appointment    Type of outreach:    Phone, left message for patient to call back.     Questions for provider review:    None                                                                                                                                    Sudarshan Guevara MA       Chart routed to Care Team .

## 2019-04-10 ENCOUNTER — TRANSFERRED RECORDS (OUTPATIENT)
Dept: HEALTH INFORMATION MANAGEMENT | Facility: CLINIC | Age: 54
End: 2019-04-10

## 2019-12-09 DIAGNOSIS — F33.0 MAJOR DEPRESSIVE DISORDER, RECURRENT EPISODE, MILD (H): ICD-10-CM

## 2019-12-10 RX ORDER — CITALOPRAM HYDROBROMIDE 20 MG/1
TABLET ORAL
Qty: 90 TABLET | Refills: 0 | Status: SHIPPED | OUTPATIENT
Start: 2019-12-10 | End: 2020-04-03

## 2019-12-10 NOTE — TELEPHONE ENCOUNTER
Routing refill request to provider for review/approval because:  Patient needs to be seen because it has been more than 1 year since last office visit.  PHQ-9 not up to date.     Next 5 appointments (look out 90 days)    Feb 13, 2020  1:15 PM CST  Return Visit with Destiny Welch MD  [facility] ([facility] [address] [place] [id number] [phone]        Yanique Rodrigues, RN, BSN

## 2019-12-10 NOTE — TELEPHONE ENCOUNTER
Pt informed.  She still has a month supply of medication left.  The pharmacy sent this request early per pt.  Pt will call back to schedule.

## 2020-02-21 ENCOUNTER — NURSE TRIAGE (OUTPATIENT)
Dept: FAMILY MEDICINE | Facility: CLINIC | Age: 55
End: 2020-02-21

## 2020-02-21 NOTE — TELEPHONE ENCOUNTER
Returned call to patient.  Mild sore throat x 1.5 weeks  Onset 3-4 days more symptoms  Chills, headache, cough, body aches, headache is her worst symptom  Denies vision changing, dizziness, not worst HA of her life, just dull and constant behind her eyes mostly  Looser stools today, which she is not too worried about - just started the Keto diet, thinks this is why  No thermometer to check temp, denies trouble breathing  Taking Tylenol for headache, not very helpful for headache  No influenza vaccine this season    DISPOSITION: HOME CARE  - home care, education, expected course, and when to call back/seek care provided    Additional Information    Negative: Severe difficulty breathing (e.g., struggling for each breath, speaks in single words)    Negative: Bluish (or gray) lips or face    Negative: Shock suspected (e.g., cold/pale/clammy skin, too weak to stand, low BP, rapid pulse)    Negative: Sounds like a life-threatening emergency to the triager    Negative: Sounds like a cold and there is no fever    Negative: Cough and there is no fever    Negative: Severe cough    Negative: Throat pain and there is no fever    Negative: Severe sore throat    Negative: Influenza vaccine reaction is suspected    Negative: Headache and stiff neck (can't touch chin to chest)    Negative: Chest pain (EXCEPTION: MILD central chest pain, present only when coughing)    Negative: Difficulty breathing that is not severe and not relieved by cleaning out the nose    Negative: Patient sounds very sick or weak to the triager    Negative: Fever > 104 F (40 C)    Negative: Fever > 101 F (38.3 C) and over 60 years of age    Negative: Fever > 100.0 F (37.8 C) and diabetes mellitus or weak immune system (e.g., HIV positive, cancer chemo, splenectomy, organ transplant, chronic steroids)    Negative: Fever > 100.0 F (37.8 C) and bedridden (e.g., nursing home patient, stroke, chronic illness, recovering from surgery)    Negative: HIGH RISK (e.g.,  age > 64 years, pregnant, HIV+, chronic medical condition) and flu symptoms    Negative: Using nasal washes and pain medicine > 24 hours and sinus pain (lower forehead, cheekbone, or eye) persists    Negative: Fever present > 3 days (72 hours)    Negative: Fever returns after gone for over 24 hours and symptoms worse (or not improved)    Negative: Earache    Negative: Patient wants to be seen    Negative: Patient requests antiviral medicine for influenza and flu symptoms present < 48 hours    Negative: Nasal discharge present > 10 days    Negative: Cough present > 3 weeks    Probable influenza (fever) with no complications and not HIGH RISK    Protocols used: INFLUENZA - SEASONAL-A-OH    Izabela Landis, BSN, RN, PHN

## 2020-02-21 NOTE — TELEPHONE ENCOUNTER
Reason for call:  Patient reporting a symptom    Symptom or request: headache low fever diarrhea    Duration (how long have symptoms been present): 4 days    Have you been treated for this before? no    Additional comments: please call to triage    Phone Number patient can be reached at:  Home number on file [phone] (home)    Best Time:  any    Can we leave a detailed message on this number:  YES    Call taken on 2/21/2020 at 1:57 PM by Liza Buitrago

## 2020-03-09 DIAGNOSIS — F33.0 MAJOR DEPRESSIVE DISORDER, RECURRENT EPISODE, MILD (H): ICD-10-CM

## 2020-03-10 RX ORDER — CITALOPRAM HYDROBROMIDE 20 MG/1
TABLET ORAL
Qty: 90 TABLET | Refills: 0 | OUTPATIENT
Start: 2020-03-10

## 2020-03-10 NOTE — TELEPHONE ENCOUNTER
Pending Prescriptions:                       Disp   Refills    citalopram (CELEXA) 20 MG tablet [Pharmacy*90 tab*0        Sig: TAKE 1 TABLET BY MOUTH EVERY DAY    PHQ 8/15/2017 3/14/2018 12/26/2018   PHQ-9 Total Score 0 0 1   Q9: Thoughts of better off dead/self-harm past 2 weeks Not at all Not at all Not at all       Routing refill request to provider for review/approval because:  Patient needs to be seen because it has been more than 1 year since last office visit.      Patrica Scanlon, MSN, RN

## 2020-03-10 NOTE — TELEPHONE ENCOUNTER
Patient given 6 month supply by Yanique Jasso in March of 2018. No follow-up since that time and should be out. Needs appointment.

## 2020-04-03 ENCOUNTER — VIRTUAL VISIT (OUTPATIENT)
Dept: FAMILY MEDICINE | Facility: CLINIC | Age: 55
End: 2020-04-03
Payer: COMMERCIAL

## 2020-04-03 DIAGNOSIS — F33.0 MAJOR DEPRESSIVE DISORDER, RECURRENT EPISODE, MILD (H): ICD-10-CM

## 2020-04-03 PROCEDURE — 96127 BRIEF EMOTIONAL/BEHAV ASSMT: CPT | Performed by: NURSE PRACTITIONER

## 2020-04-03 PROCEDURE — 99213 OFFICE O/P EST LOW 20 MIN: CPT | Mod: TEL | Performed by: NURSE PRACTITIONER

## 2020-04-03 RX ORDER — CITALOPRAM HYDROBROMIDE 20 MG/1
20 TABLET ORAL DAILY
Qty: 90 TABLET | Refills: 1 | Status: SHIPPED | OUTPATIENT
Start: 2020-04-03 | End: 2020-08-20

## 2020-04-03 RX ORDER — CITALOPRAM HYDROBROMIDE 20 MG/1
TABLET ORAL
Qty: 90 TABLET | Refills: 0 | OUTPATIENT
Start: 2020-04-03

## 2020-04-03 ASSESSMENT — ANXIETY QUESTIONNAIRES
1. FEELING NERVOUS, ANXIOUS, OR ON EDGE: NOT AT ALL
6. BECOMING EASILY ANNOYED OR IRRITABLE: NOT AT ALL
IF YOU CHECKED OFF ANY PROBLEMS ON THIS QUESTIONNAIRE, HOW DIFFICULT HAVE THESE PROBLEMS MADE IT FOR YOU TO DO YOUR WORK, TAKE CARE OF THINGS AT HOME, OR GET ALONG WITH OTHER PEOPLE: NOT DIFFICULT AT ALL
5. BEING SO RESTLESS THAT IT IS HARD TO SIT STILL: NOT AT ALL
3. WORRYING TOO MUCH ABOUT DIFFERENT THINGS: NOT AT ALL
GAD7 TOTAL SCORE: 0
7. FEELING AFRAID AS IF SOMETHING AWFUL MIGHT HAPPEN: NOT AT ALL
2. NOT BEING ABLE TO STOP OR CONTROL WORRYING: NOT AT ALL

## 2020-04-03 ASSESSMENT — PATIENT HEALTH QUESTIONNAIRE - PHQ9
SUM OF ALL RESPONSES TO PHQ QUESTIONS 1-9: 0
5. POOR APPETITE OR OVEREATING: NOT AT ALL

## 2020-04-03 NOTE — PROGRESS NOTES
"Subjective     Genesis Adhikari is a 54 year old female who is being evaluated via a billable telephone visit.      The patient has been notified of following:     \"This telephone visit will be conducted via a call between you and your physician/provider. We have found that certain health care needs can be provided without the need for a physical exam.  This service lets us provide the care you need with a short phone conversation.  If a prescription is necessary we can send it directly to your pharmacy.  If lab work is needed we can place an order for that and you can then stop by our lab to have the test done at a later time.    If during the course of the call the physician/provider feels a telephone visit is not appropriate, you will not be charged for this service.\"     Patient has given verbal consent for Telephone visit?  Yes    Genesis Adhikari complains of   Chief Complaint   Patient presents with     med check       ALLERGIES  No known drug allergies    Depression Followup    How are you doing with your depression since your last visit? Very good, occ. Anxiety.    Are you having other symptoms that might be associated with depression? No    Have you had a significant life event?  OTHER: [employer]- working lots at [employer]     Are you feeling anxious or having panic attacks?   No    Do you have any concerns with your use of alcohol or other drugs? No    Social History     Tobacco Use     Smoking status: Former Smoker     Packs/day: 0.50     Years: 25.00     Pack years: 12.50     Types: Cigarettes     Last attempt to quit: 2018     Years since quittin.1     Smokeless tobacco: Never Used     Tobacco comment: quit 2018    Substance Use Topics     Alcohol use: Yes     Alcohol/week: 10.0 standard drinks     Comment: 4-5 per week      Drug use: No     PHQ 3/14/2018 2018 4/3/2020   PHQ-9 Total Score 0 1 0   Q9: Thoughts of better off dead/self-harm past 2 weeks Not at all Not at all Not at " all     CHELSY-7 SCORE 3/14/2018 12/26/2018 4/3/2020   Total Score - - -   Total Score 0 (minimal anxiety) - -   Total Score 0 0 0     Last PHQ-9 4/3/2020   1.  Little interest or pleasure in doing things 0   2.  Feeling down, depressed, or hopeless 0   3.  Trouble falling or staying asleep, or sleeping too much 0   4.  Feeling tired or having little energy 0   5.  Poor appetite or overeating 0   6.  Feeling bad about yourself 0   7.  Trouble concentrating 0   8.  Moving slowly or restless 0   Q9: Thoughts of better off dead/self-harm past 2 weeks 0   PHQ-9 Total Score 0   Difficulty at work, home, or with people Not difficult at all     CHELSY-7  4/3/2020   1. Feeling nervous, anxious, or on edge 0   2. Not being able to stop or control worrying 0   3. Worrying too much about different things 0   4. Trouble relaxing 0   5. Being so restless that it is hard to sit still 0   6. Becoming easily annoyed or irritable 0   7. Feeling afraid, as if something awful might happen 0   CHELSY-7 Total Score 0   If you checked any problems, how difficult have they made it for you to do your work, take care of things at home, or get along with other people? Not difficult at all         Suicide Assessment Five-step Evaluation and Treatment (SAFE-T)      How many servings of fruits and vegetables do you eat daily?  4 or more    On average, how many sweetened beverages do you drink each day (Examples: soda, juice, sweet tea, etc.  Do NOT count diet or artificially sweetened beverages)?   0    How many days per week do you exercise enough to make your heart beat faster? none    How many minutes a day do you exercise enough to make your heart beat faster?     How many days per week do you miss taking your medication? 0                 Reviewed and updated as needed this visit by Provider  Tobacco  Allergies  Meds  Problems  Med Hx  Surg Hx  Fam Hx         Review of Systems   ROS COMP: Constitutional, HEENT, cardiovascular, pulmonary, gi  "and gu systems are negative, except as otherwise noted.       Objective   Reported vitals:  There were no vitals taken for this visit.   alert and no distress  Psych: Alert and oriented times 3; coherent speech, normal   rate and volume, able to articulate logical thoughts, able   to abstract reason, no tangential thoughts, no hallucinations   or delusions  Her affect is normal bright.      Diagnostic Test Results:  Labs reviewed in Epic  none         Assessment/Plan:  1. Major depressive disorder, recurrent episode, mild (H)  stable  - citalopram (CELEXA) 20 MG tablet; Take 1 tablet (20 mg) by mouth daily  Dispense: 90 tablet; Refill: 1    Patient is stable for home plan.  Continue plan.    \"Stay at Home\" cares.   Refills done.     Plan changes: none  Labs/Imaging: none    Post-Covid (yes/no): NA     Return in about 6 months (around 10/3/2020).      Phone call duration:  6 minutes    AMADOU Koenig CNP      "

## 2020-04-03 NOTE — TELEPHONE ENCOUNTER
Pending Prescriptions:                       Disp   Refills    citalopram (CELEXA) 20 MG tablet [Pharmacy*90 tab*0        Sig: TAKE 1 TABLET BY MOUTH EVERY DAY    Routing refill request to provider for review/approval because:  RN unable to give grave PHQ9 is over due and she is due for a visit.

## 2020-04-04 ASSESSMENT — ANXIETY QUESTIONNAIRES: GAD7 TOTAL SCORE: 0

## 2020-07-20 ENCOUNTER — VIRTUAL VISIT (OUTPATIENT)
Dept: FAMILY MEDICINE | Facility: OTHER | Age: 55
End: 2020-07-20

## 2020-07-20 NOTE — PROGRESS NOTES
"Date: 2020 10:12:45  Clinician: Shyam Kaufman  Clinician NPI: [id number]  Patient: Genesis Adhikari  Patient : 1965  Patient Address[facility] [place] [address]  Patient Phone: [phone]  Visit Protocol: URI  Patient Summary:  Genesis is a 55 year old ( : 1965 ) female who initiated a Visit for COVID-19 (Coronavirus) evaluation and screening. When asked the question \"Please sign me up to receive news, health information and promotions. \", Genesis responded \"No\".    Genesis states her symptoms started gradually 3-4 days ago.   Her symptoms consist of malaise, a cough, and nasal congestion. She is experiencing mild difficulty breathing with activities but can speak normally in full sentences.   Symptom details     Nasal secretions: The color of her mucus is yellow.    Cough: Genesis coughs a few times an hour and her cough is more bothersome at night. Phlegm comes into her throat when she coughs. She does not believe her cough is caused by post-nasal drip. The color of the phlegm is yellow.      Genesis denies having wheezing, nausea, teeth pain, ageusia, diarrhea, vomiting, rhinitis, ear pain, headache, chills, sore throat, enlarged lymph nodes, myalgias, anosmia, facial pain or pressure, and fever. She also denies having recent facial or sinus surgery in the past 60 days, taking antibiotic medication in the past month, and double sickening (worsening symptoms after initial improvement).   Precipitating events  She has not recently been exposed to someone with influenza. Genesis has been in close contact with the following high risk individuals: adults 65 or older.   Pertinent COVID-19 (Coronavirus) information  In the past 14 days, Genesis has not worked in a congregate living setting.   She does not work or volunteer as healthcare worker or a  and does not work or volunteer in a healthcare facility.   Genesis also has not lived in a congregate living setting in the " past 14 days. She lives with a healthcare worker.   Genesis has had a close contact with a laboratory-confirmed COVID-19 patient within 14 days of symptom onset. Additional information about contact with COVID-19 (Coronavirus) patient as reported by the patient (free text): Work in close proximity of a co-worker who just tested positive for COVID. Actually 2 people have it   Pertinent medical history  Genesis typically gets a yeast infection when she takes antibiotics. She is not sure if she has used fluconazole (Diflucan) to treat previous yeast infections.   Genesis needs a return to work/school note.   Weight: 220 lbs   Genesis does not smoke or use smokeless tobacco.   Weight: 220 lbs    MEDICATIONS: citalopram oral, ALLERGIES: NKDA  Clinician Response:  Dear Genesis,   Your symptoms show that you may have coronavirus (COVID-19). This illness can cause fever, cough and trouble breathing. Many people get a mild case and get better on their own. Some people can get very sick.  What should I do?  We would like to test you for this virus.   1. Please call [phone] to schedule your visit. Explain that you were referred by [facility] to have a COVID-19 test. Be ready to share your [facility] visit ID number.  The following will serve as your written order for this COVID Test, ordered by me, for the indication of suspected COVID [Z20.828]: The test will be ordered in [employer], our electronic health record, after you are scheduled. It will show as ordered and authorized by Adam Harrell MD.  Order: COVID-19 (Coronavirus) PCR for SYMPTOMATIC testing from [facility].      2. When it's time for your COVID test:  Stay at least 6 feet away from others. (If someone will drive you to your test, stay in the backseat, as far away from the  as you can.)   Cover your mouth and nose with a mask, tissue or washcloth.  Go straight to the testing site. Don't make any stops on the way there or back.      3.Starting now: Stay home and away from  "others (self-isolate) until:   You've had no fever---and no medicine that reduces fever---for 3 full days (72 hours). And...   Your other symptoms have gotten better. For example, your cough or breathing has improved. And...   At least 10 days have passed since your symptoms started.       During this time, don't leave the house except for testing or medical care.   Stay in your own room, even for meals. Use your own bathroom if you can.   Stay away from others in your home. No hugging, kissing or shaking hands. No visitors.  Don't go to work, school or anywhere else.    Clean \"high touch\" surfaces often (doorknobs, counters, handles, etc.). Use a household cleaning spray or wipes. You'll find a full list of  on the EPA website: www.epa.gov/pesticide-registration/list-n-disinfectants-use-against-sars-cov-2.   Cover your mouth and nose with a mask, tissue or washcloth to avoid spreading germs.  Wash your hands and face often. Use soap and water.  Caregivers in these groups are at risk for severe illness due to COVID-19:  o People 65 years and older  o People who live in a nursing home or long-term care facility  o People with chronic disease (lung, heart, cancer, diabetes, kidney, liver, immunologic)  o People who have a weakened immune system, including those who:   Are in cancer treatment  Take medicine that weakens the immune system, such as corticosteroids  Had a bone marrow or organ transplant  Have an immune deficiency  Have poorly controlled HIV or AIDS  Are obese (body mass index of 40 or higher)  Smoke regularly   o Caregivers should wear gloves while washing dishes, handling laundry and cleaning bedrooms and bathrooms.  o Use caution when washing and drying laundry: Don't shake dirty laundry, and use the warmest water setting that you can.  o For more tips, go to www.cdc.gov/coronavirus/2019-ncov/downloads/10Things.pdf.    4.Sign up for [employer]. We know it's scary to hear that you might have " COVID-19. We want to track your symptoms to make sure you're okay over the next 2 weeks. Please look for an email from [employer]---this is a free, online program that we'll use to keep in touch. To sign up, follow the link in the email. Learn more at [email or web address]  How can I take care of myself?   Get lots of rest. Drink extra fluids (unless a doctor has told you not to).   Take Tylenol (acetaminophen) for fever or pain. If you have liver or kidney problems, ask your family doctor if it's okay to take Tylenol.   Adults can take either:    650 mg (two 325 mg pills) every 4 to 6 hours, or...   1,000 mg (two 500 mg pills) every 8 hours as needed.    Note: Don't take more than 3,000 mg in one day. Acetaminophen is found in many medicines (both prescribed and over-the-counter medicines). Read all labels to be sure you don't take too much.   For children, check the Tylenol bottle for the right dose. The dose is based on the child's age or weight.    If you have other health problems (like cancer, heart failure, an organ transplant or severe kidney disease): Call your specialty clinic if you don't feel better in the next 2 days.       Know when to call 911. Emergency warning signs include:    Trouble breathing or shortness of breath Pain or pressure in the chest that doesn't go away Feeling confused like you haven't felt before, or not being able to wake up Bluish-colored lips or face.  Where can I get more information?    [employer] -- About COVID-19: [email or web address]   CDC -- What to Do If You're Sick: www.cdc.gov/coronavirus/2019-ncov/about/steps-when-sick.html   CDC -- Ending Home Isolation: www.cdc.gov/coronavirus/2019-ncov/hcp/disposition-in-home-patients.html   CDC -- Caring for Someone: www.cdc.gov/coronavirus/2019-ncov/if-you-are-sick/care-for-someone.html   [facility] -- Interim Guidance for Hospital Discharge to Home:  www.health.[facility].mn.us/diseases/coronavirus/hcp/hospdischarge.pdf   [facility] clinical trials (COVID-19 research studies): clinicalaffairs.[facility]/umn-clinical-trials    Below are the COVID-19 hotlines at the [facility] ([facility]). Interpreters are available.    For health questions: Call 798-807-6177 or 1-616.483.2177 (7 a.m. to 7 p.m.) For questions about schools and childcare: Call 249-679-4836 or 1-431.666.9308 (7 a.m. to 7 p.m.)    Diagnosis: Cough  Diagnosis ICD: R05

## 2020-07-21 DIAGNOSIS — Z20.822 SUSPECTED COVID-19 VIRUS INFECTION: Primary | ICD-10-CM

## 2020-07-22 DIAGNOSIS — Z20.822 SUSPECTED COVID-19 VIRUS INFECTION: ICD-10-CM

## 2020-07-22 PROCEDURE — U0003 INFECTIOUS AGENT DETECTION BY NUCLEIC ACID (DNA OR RNA); SEVERE ACUTE RESPIRATORY SYNDROME CORONAVIRUS 2 (SARS-COV-2) (CORONAVIRUS DISEASE [COVID-19]), AMPLIFIED PROBE TECHNIQUE, MAKING USE OF HIGH THROUGHPUT TECHNOLOGIES AS DESCRIBED BY CMS-2020-01-R: HCPCS | Performed by: FAMILY MEDICINE

## 2020-07-22 NOTE — LETTER
July 23, 2020        Genesis Adhikari  [address] [place] [address]    This letter provides a written record that you were tested for COVID-19 on 7/22/20.     Your result was positive. This means you have COVID-19 (coronavirus).    This means that we found the virus that causes COVID-19 in your sample.    How can I protect others?If you have symptoms, stay home and away from others (self-isolate) until:You ve had no fever--and no medicine that reduces fever--for 3 full days (72 hours). And      Your other symptoms have gotten better. For example, your cough or breathing has improved. And     At least 10 days have passed since your symptoms started.    If you don t have symptoms: Stay home and away from others (self-isolate) until at least 10 days have passed since your first positive COVID-19 test.    During this time:    Stay in your own room, including for meals. Use your own bathroom if you can.    Stay away from others in your home. No hugging, kissing or shaking hands. No visitors.     Don t go to work, school or anywhere else.     Clean  high touch  surfaces often (doorknobs, counters, handles, etc.). Use a household cleaning spray or wipes. You ll find a full list on the EPA website at www.epa.gov/pesticide-registration/list-n-disinfectants-use-against-sars-cov-2.     Cover your mouth and nose with a mask, tissue or washcloth to avoid spreading germs.    Wash your hands and face often with soap and water.    Caregivers in these groups are at risk for severe illness due to COVID-19:  o People 65 years and older  o People who live in a nursing home or long-term care facility  o People with chronic disease (lung, heart, cancer, diabetes, kidney, liver, immunologic)  o People who have a weakened immune system, including those who:  - Are in cancer treatment  - Take medicine that weakens the immune system, such as corticosteroids  - Had a bone marrow or organ transplant  - Have an immune  deficiency  - Have poorly controlled HIV or AIDS  - Are obese (body mass index of 40 or higher)  - Smoke regularly    Caregivers should wear gloves while washing dishes, handling laundry and cleaning bedrooms and bathrooms.    Wash and dry laundry with special caution. Don t shake dirty laundry, and use the warmest water setting you can.    If you have a weakened immune system, ask your doctor about other actions you should take.    For more tips, go to www.cdc.gov/coronavirus/2019-ncov/downloads/10Things.pdf.    You should not go back to work until you meet the guidelines above for ending your home isolation. You should meet these along with any other guidelines that your employer has.    Employers: This document serves as formal notice of your employee s medical guidelines for going back to work. They must meet the above guidelines before going back to work in person.    How can I take care of myself?    1. Get lots of rest. Drink extra fluids (unless a doctor has told you not to).    2. Take Tylenol (acetaminophen) for fever or pain. If you have liver or kidney problems, ask your family doctor if it s okay to take Tylenol.     Take either:     650 mg (two 325 mg pills) every 4 to 6 hours, or     1,000 mg (two 500 mg pills) every 8 hours as needed.     Note: Don t take more than 3,000 mg in one day. Acetaminophen is found in many medicines (both prescribed and over-the-counter medicines). Read all labels to be sure you don t take too much.    For children, check the Tylenol bottle for the right dose (based on their age or weight).    3. If you have other health problems (like cancer, heart failure, an organ transplant or severe kidney disease): Call your specialty clinic if you don t feel better in the next 2 days.    4. Know when to call 911: Emergency warning signs include:    Trouble breathing or shortness of breath    Pain or pressure in the chest that doesn t go away    Feeling confused like you haven t felt  before, or not being able to wake up    Bluish-colored lips or face    5. Sign up for [employer]. We know it s scary to hear that you have COVID-19. We want to track your symptoms to make sure you re okay over the next 2 weeks. Please look for an email from [employer]--this is a free, online program that we ll use to keep in touch. To sign up, follow the link in the email. Learn more at [email or web address].      Where can I get more information?    [facility]: [email or web address]    Coronavirus Basics: www.health[facility]/diseases/coronavirus/basics.html    What to Do If You re Sick: www.cdc.gov/coronavirus/2019-ncov/about/steps-when-sick.html    Ending Home Isolation: www.cdc.gov/coronavirus/2019-ncov/hcp/disposition-in-home-patients.html     Caring for Someone with COVID-19: www.cdc.gov/coronavirus/2019-ncov/if-you-are-sick/care-for-someone.html     [facility] clinical trials (COVID-19 research studies): clinicalaffairs.[facility]-clinical-trials

## 2020-07-23 ENCOUNTER — TELEPHONE (OUTPATIENT)
Dept: URGENT CARE | Facility: URGENT CARE | Age: 55
End: 2020-07-23

## 2020-07-23 LAB
SARS-COV-2 RNA SPEC QL NAA+PROBE: ABNORMAL
SPECIMEN SOURCE: ABNORMAL

## 2020-07-23 NOTE — TELEPHONE ENCOUNTER
Coronavirus (COVID-19) Notification    Reason for call  Notify of POSITIVE  COVID-19 lab result, assess symptoms,  review [facility] recommendations    Lab Result   Lab test for 2019-nCoV rRt-PCR or SARS-COV-2 PCR  Oropharyngeal AND/OR nasopharyngeal swabs were POSITIVE for 2019-nCoV RNA [OR] SARS-COV-2 RNA (COVID-19) RNA     We have been unable to reach Patient by phone at this time to notify of their Positive COVID-19 result.  Left voicemail message requesting a call back between 8 am to 6:30 p.m. to [phone] [facility] for results.   (Weekends, this line is available from 10A to 6:30P)     POSITIVE COVID-19 Letter sent.    [Name]   Pratima Dueñas LPN

## 2020-08-20 ENCOUNTER — VIRTUAL VISIT (OUTPATIENT)
Dept: FAMILY MEDICINE | Facility: CLINIC | Age: 55
End: 2020-08-20
Payer: COMMERCIAL

## 2020-08-20 DIAGNOSIS — F33.0 MAJOR DEPRESSIVE DISORDER, RECURRENT EPISODE, MILD (H): ICD-10-CM

## 2020-08-20 DIAGNOSIS — Z12.31 ENCOUNTER FOR SCREENING MAMMOGRAM FOR BREAST CANCER: ICD-10-CM

## 2020-08-20 DIAGNOSIS — K08.89 TOOTH PAIN: Primary | ICD-10-CM

## 2020-08-20 PROCEDURE — 99214 OFFICE O/P EST MOD 30 MIN: CPT | Mod: 95 | Performed by: NURSE PRACTITIONER

## 2020-08-20 RX ORDER — CITALOPRAM HYDROBROMIDE 20 MG/1
20 TABLET ORAL DAILY
Qty: 90 TABLET | Refills: 1 | Status: SHIPPED | OUTPATIENT
Start: 2020-08-20 | End: 2020-12-17

## 2020-08-20 RX ORDER — CLINDAMYCIN HCL 300 MG
300 CAPSULE ORAL 3 TIMES DAILY
Qty: 27 CAPSULE | Refills: 0 | Status: SHIPPED | OUTPATIENT
Start: 2020-08-20 | End: 2020-08-29

## 2020-08-20 ASSESSMENT — PATIENT HEALTH QUESTIONNAIRE - PHQ9
5. POOR APPETITE OR OVEREATING: NOT AT ALL
SUM OF ALL RESPONSES TO PHQ QUESTIONS 1-9: 0

## 2020-08-20 ASSESSMENT — ANXIETY QUESTIONNAIRES
IF YOU CHECKED OFF ANY PROBLEMS ON THIS QUESTIONNAIRE, HOW DIFFICULT HAVE THESE PROBLEMS MADE IT FOR YOU TO DO YOUR WORK, TAKE CARE OF THINGS AT HOME, OR GET ALONG WITH OTHER PEOPLE: NOT DIFFICULT AT ALL
6. BECOMING EASILY ANNOYED OR IRRITABLE: NOT AT ALL
7. FEELING AFRAID AS IF SOMETHING AWFUL MIGHT HAPPEN: NOT AT ALL
1. FEELING NERVOUS, ANXIOUS, OR ON EDGE: NOT AT ALL
GAD7 TOTAL SCORE: 0
3. WORRYING TOO MUCH ABOUT DIFFERENT THINGS: NOT AT ALL
2. NOT BEING ABLE TO STOP OR CONTROL WORRYING: NOT AT ALL
5. BEING SO RESTLESS THAT IT IS HARD TO SIT STILL: NOT AT ALL

## 2020-08-20 ASSESSMENT — PAIN SCALES - GENERAL: PAINLEVEL: SEVERE PAIN (7)

## 2020-08-20 NOTE — PROGRESS NOTES
"Genesis Adhikari is a 55 year old female who is being evaluated via a billable video visit.      The patient has been notified of following:     \"This video visit will be conducted via a call between you and your physician/provider. We have found that certain health care needs can be provided without the need for an in-person physical exam.  This service lets us provide the care you need with a video conversation.  If a prescription is necessary we can send it directly to your pharmacy.  If lab work is needed we can place an order for that and you can then stop by our lab to have the test done at a later time.    Video visits are billed at different rates depending on your insurance coverage.  Please reach out to your insurance provider with any questions.    If during the course of the call the physician/provider feels a video visit is not appropriate, you will not be charged for this service.\"    Patient has given verbal consent for Video visit? Yes  How would you like to obtain your AVS? Mail a copy  If you are dropped from the video visit, the video invite should be resent to: Text to cell phone: [phone]  Will anyone else be joining your video visit? No    Subjective     Genesis Adhikari is a 55 year old female who presents today via video visit for the following health issues:    HPI    Patient tested positive for covid, still have a cough.     Concern - tooth ache   Onset: 3 days   Description: top right molar  Intensity: severe  Progression of Symptoms:  worsening  Accompanying Signs & Symptoms: throbbing pain   Previous history of similar problem: chip tooth 1.5 yr ago   Precipitating factors:        Worsened by:   Alleviating factors:        Improved by: none   Therapies tried and outcome: \"old penicillin medication, I took the third one\" pt is hoping to get some medication. Pt has dental appt on Monday.     Mood stable, reviewed PHQ-9 and CHELSY 7, due for refills.   Overdue for PE.   Taking old vicodin " RX for sleep- 1 pill si working.      Had Covid and has gotten over this. Mood was a bit low at that time, but doing fine now.     Video Start Time: 1:42 PM  Constitutional, HEENT, cardiovascular, pulmonary, gi and gu systems are negative, except as otherwise noted.      Review of Systems         Objective           Vitals:  No vitals were obtained today due to virtual visit.    Physical Exam     GENERAL: Healthy, alert and no distress  EYES: Eyes grossly normal to inspection.  No discharge or erythema, or obvious scleral/conjunctival abnormalities.  RESP: No audible wheeze, cough, or visible cyanosis.  No visible retractions or increased work of breathing.    SKIN: Visible skin clear. No significant rash, abnormal pigmentation or lesions.  NEURO: Cranial nerves grossly intact.  Mentation and speech appropriate for age.  PSYCH: Mentation appears normal, affect normal/bright, judgement and insight intact, normal speech and appearance well-groomed.              Assessment & Plan     Genesis was seen today for dental injury.    Diagnoses and all orders for this visit:    Tooth pain  -     clindamycin (CLEOCIN) 300 MG capsule; Take 1 capsule (300 mg) by mouth 3 times daily for 9 days    Major depressive disorder, recurrent episode, mild (H)  -     citalopram (CELEXA) 20 MG tablet; Take 1 tablet (20 mg) by mouth daily           MEDICATIONS:        - Start taking Clindamycin- with food.        - Continue other medications without change  FUTURE APPOINTMENTS:       - Follow-up visit in 2020 for PE      Return in about 4 weeks (around 9/17/2020) for Physical Exam.    AMADOU Koenig CNP  [facility]      Video-Visit Details    Type of service:  Video Visit    Video End Time:1349    Originating Location (pt. Location): Home    Distant Location (provider location):  [facility]     Platform used for Video Visit: Trend[employer]

## 2020-08-21 ASSESSMENT — ANXIETY QUESTIONNAIRES: GAD7 TOTAL SCORE: 0

## 2020-08-25 ENCOUNTER — NURSE TRIAGE (OUTPATIENT)
Dept: NURSING | Facility: CLINIC | Age: 55
End: 2020-08-25

## 2020-08-25 NOTE — TELEPHONE ENCOUNTER
In July she tested positive for covid and she wants to donate plasma . She needs a letter with her positive test results to prove she had it for where she is donating blood.  Number given to medical records.    Patricia Mccarthy RN/ [facility]        Additional Information    General information question, no triage required and triager able to answer question    Protocols used: INFORMATION ONLY CALL-A-AH

## 2020-10-08 NOTE — PROGRESS NOTES
SUBJECTIVE:   CC: Genesis Adhikari is an 55 year old woman who presents for preventive health visit.     Patient has been advised of split billing requirements and indicates understanding: Yes     Healthy Habits:     Getting at least 3 servings of Calcium per day:  Yes    Bi-annual eye exam:  NO    Dental care twice a year:  NO    Sleep apnea or symptoms of sleep apnea:  None    Diet:  Other    Frequency of exercise:  4-5 days/week    Duration of exercise:  15-30 minutes    Taking medications regularly:  Yes    Medication side effects:  None    PHQ-2 Total Score: 1    Additional concerns today:  Yes    Depression and Anxiety Follow-Up    How are you doing with your depression since your last visit? Up and down     How are you doing with your anxiety since your last visit?  Up and down    Are you having other symptoms that might be associated with depression or anxiety? No    Have you had a significant life event? OTHER: COVID     Do you have any concerns with your use of alcohol or other drugs? No    Social History     Tobacco Use     Smoking status: Former Smoker     Packs/day: 0.50     Years: 25.00     Pack years: 12.50     Types: Cigarettes     Quit date: 2018     Years since quittin.6     Smokeless tobacco: Never Used     Tobacco comment: quit 2018    Substance Use Topics     Alcohol use: Yes     Alcohol/week: 10.0 standard drinks     Comment: 4-5 per week      Drug use: No     PHQ 2018 4/3/2020 2020   PHQ-9 Total Score 1 0 0   Q9: Thoughts of better off dead/self-harm past 2 weeks Not at all Not at all Not at all     CHELSY-7 SCORE 2018 4/3/2020 2020   Total Score - - -   Total Score - - -   Total Score 0 0 0     Last PHQ-9 2020   1.  Little interest or pleasure in doing things 0   2.  Feeling down, depressed, or hopeless 0   3.  Trouble falling or staying asleep, or sleeping too much 0   4.  Feeling tired or having little energy 0   5.  Poor appetite or overeating  0   6.  Feeling bad about yourself 0   7.  Trouble concentrating 0   8.  Moving slowly or restless 0   Q9: Thoughts of better off dead/self-harm past 2 weeks 0   PHQ-9 Total Score 0   Difficulty at work, home, or with people Not difficult at all     CHELSY-7  2020   1. Feeling nervous, anxious, or on edge 0   2. Not being able to stop or control worrying 0   3. Worrying too much about different things 0   4. Trouble relaxing 0   5. Being so restless that it is hard to sit still 0   6. Becoming easily annoyed or irritable 0   7. Feeling afraid, as if something awful might happen 0   CHELSY-7 Total Score 0   If you checked any problems, how difficult have they made it for you to do your work, take care of things at home, or get along with other people? Not difficult at all       Suicide Assessment Five-step Evaluation and Treatment (SAFE-T)      Today's PHQ-2 Score:   PHQ-2 (  [employer]) 10/9/2020   Q1: Little interest or pleasure in doing things 0   Q2: Feeling down, depressed or hopeless 1   PHQ-2 Score 1   Q1: Little interest or pleasure in doing things Not at all   Q2: Feeling down, depressed or hopeless Several days   PHQ-2 Score 1     Abuse: Current or Past (Physical, Sexual or Emotional) - No  Do you feel safe in your environment? Yes    Have you ever done Advance Care Planning? (For example, a Health Directive, POLST, or a discussion with a medical provider or your loved ones about your wishes): No, advance care planning information given to patient to review.  Patient declined advance care planning discussion at this time.    Social History     Tobacco Use     Smoking status: Former Smoker     Packs/day: 0.50     Years: 25.00     Pack years: 12.50     Types: Cigarettes     Quit date: 2018     Years since quittin.6     Smokeless tobacco: Never Used     Tobacco comment: quit 2018    Substance Use Topics     Alcohol use: Yes     Alcohol/week: 10.0 standard drinks     Comment: 4-5 per week       Alcohol Use 10/9/2020   Prescreen: >3 drinks/day or >7 drinks/week? No   Prescreen: >3 drinks/day or >7 drinks/week? -     Reviewed orders with patient.  Reviewed health maintenance and updated orders accordingly - Yes      Mammogram Screening: Patient over age 50, mutual decision to screen reflected in health maintenance.    Pertinent mammograms are reviewed under the imaging tab.  History of abnormal Pap smear:   NO - age 30-65 PAP every 5 years with negative HPV co-testing recommended  Last 3 Pap Results:   PAP (no units)   Date Value   06/24/2016 NIL   05/07/2009 NIL     Last 3 Pap and HPV Results:   PAP / HPV Latest Ref Rng & Units 6/24/2016 5/7/2009   PAP - NIL NIL   HPV 16 DNA NEG Negative -   HPV 18 DNA NEG Negative -   OTHER HR HPV NEG Negative -     PAP / HPV Latest Ref Rng & Units 6/24/2016 5/7/2009   PAP - NIL NIL   HPV 16 DNA NEG Negative -   HPV 18 DNA NEG Negative -   OTHER HR HPV NEG Negative -     Reviewed and updated as needed this visit by clinical staff  Tobacco  Allergies  Meds   Med Hx  Surg Hx  Fam Hx  Soc Hx        Reviewed and updated as needed this visit by Provider                    Review of Systems   Constitutional: Negative for chills and fever.   HENT: Negative for congestion, ear pain, hearing loss and sore throat.    Eyes: Negative for pain and visual disturbance.   Respiratory: Negative for cough and shortness of breath.    Cardiovascular: Negative for chest pain, palpitations and peripheral edema.   Gastrointestinal: Positive for diarrhea. Negative for abdominal pain, constipation, heartburn, hematochezia and nausea.   Breasts:  Negative for tenderness, breast mass and discharge.   Genitourinary: Negative for dysuria, frequency, genital sores, hematuria, pelvic pain, urgency, vaginal bleeding and vaginal discharge.   Musculoskeletal: Negative for arthralgias, joint swelling and myalgias.   Skin: Negative for rash.   Neurological: Positive for weakness. Negative for  dizziness, headaches and paresthesias.   Psychiatric/Behavioral: Negative for mood changes. The patient is nervous/anxious.    Patient feels she is anxious about rachael COVID for second time.  Has mild residual loose stools.  She notes they seem to be floating more in the toilet.  Denies abdominal pain.    Patient does not want breast or pelvic exam done today.  She had penicillin recently and has some mild vaginal irritation.  Patient declines bone density.  Declines flu vaccine.     OBJECTIVE:   There were no vitals taken for this visit.  Physical Exam  GENERAL: healthy, alert and no distress  EYES: Eyes grossly normal to inspection, PERRL and conjunctivae and sclerae normal  HENT: ear canals and TM's normal, nose and mouth without ulcers or lesions  NECK: no adenopathy, no asymmetry, masses, or scars and thyroid normal to palpation  RESP: lungs clear to auscultation - no rales, rhonchi or wheezes  CV: regular rate and rhythm, normal S1 S2, no S3 or S4, no murmur, click or rub, no peripheral edema and peripheral pulses strong  ABDOMEN: soft, nontender, no hepatosplenomegaly, no masses and bowel sounds normal  MS: no gross musculoskeletal defects noted, no edema  SKIN: no suspicious lesions or rashes  NEURO: Normal strength and tone, mentation intact and speech normal  PSYCH: mentation appears normal and anxious    Diagnostic Test Results:  Labs reviewed in Epic  Results for orders placed or performed in visit on 10/09/20 (from the past 24 hour(s))   Hemoglobin   Result Value Ref Range    Hemoglobin 14.3 11.7 - 15.7 g/dL       ASSESSMENT/PLAN:       ICD-10-CM    1. Routine general medical examination at a health care facility  Z00.00 GLUCOSE     TDAP VACCINE     TSH with free T4 reflex     Hemoglobin     [place] ZOSTER VACCINE RECOMBINANT ADJUVANTED IM NJX     DISCONTINUED: zoster vaccine recombinant adjuvanted (SHINGRIX) injection   2. Screening for hyperlipidemia  Z13.220    3. Major depressive disorder,  "recurrent episode, mild (H)  F33.0 citalopram (CELEXA) 20 MG tablet   4. Vaginal irritation  N89.8 fluconazole (DIFLUCAN) 150 MG tablet   5. Encounter for screening mammogram for breast cancer  Z12.31 *MA Screening Digital Bilateral       Patient has been advised of split billing requirements and indicates understanding: Yes  COUNSELING:  Reviewed preventive health counseling, as reflected in patient instructions       Regular exercise       Healthy diet/nutrition       Vision screening       Osteoporosis Prevention/Bone Health       Colon cancer screening       Mood.  Patient states her mood is been fairly well controlled.  Has high anxiety about COVID.  Discussed increased dose of Celexa.  Patient declines as today.  She will consider increasing in the future if her mood does not improve.  Patient is asked to notify us if she does a dose increase.  2 shingles vaccine recommended.    Mammogram ordered.    Estimated body mass index is 36.15 kg/m  as calculated from the following:    Height as of 12/26/18: 1.676 m (5' 6\").    Weight as of 12/26/18: 101.6 kg (224 lb).    Weight management plan: Discussed healthy diet and exercise guidelines    She reports that she quit smoking about 2 years ago. Her smoking use included cigarettes. She has a 12.50 pack-year smoking history. She has never used smokeless tobacco.      Counseling Resources:  ATP IV Guidelines  Pooled Cohorts Equation Calculator  Breast Cancer Risk Calculator  BRCA-Related Cancer Risk Assessment: FHS-7 Tool  FRAX Risk Assessment  ICSI Preventive Guidelines  Dietary Guidelines for Americans, 2010  USDA's MyPlate  ASA Prophylaxis  Lung CA Screening    AMADOU Koenig CNP  [facility]  "

## 2020-10-09 ENCOUNTER — OFFICE VISIT (OUTPATIENT)
Dept: FAMILY MEDICINE | Facility: CLINIC | Age: 55
End: 2020-10-09
Payer: COMMERCIAL

## 2020-10-09 VITALS
OXYGEN SATURATION: 97 % | BODY MASS INDEX: 38.86 KG/M2 | DIASTOLIC BLOOD PRESSURE: 78 MMHG | WEIGHT: 241.8 LBS | HEIGHT: 66 IN | HEART RATE: 74 BPM | RESPIRATION RATE: 16 BRPM | SYSTOLIC BLOOD PRESSURE: 126 MMHG | TEMPERATURE: 97.7 F

## 2020-10-09 DIAGNOSIS — Z13.220 SCREENING FOR HYPERLIPIDEMIA: ICD-10-CM

## 2020-10-09 DIAGNOSIS — N89.8 VAGINAL IRRITATION: ICD-10-CM

## 2020-10-09 DIAGNOSIS — F33.0 MAJOR DEPRESSIVE DISORDER, RECURRENT EPISODE, MILD (H): ICD-10-CM

## 2020-10-09 DIAGNOSIS — Z12.31 ENCOUNTER FOR SCREENING MAMMOGRAM FOR BREAST CANCER: ICD-10-CM

## 2020-10-09 DIAGNOSIS — Z00.00 ROUTINE GENERAL MEDICAL EXAMINATION AT A HEALTH CARE FACILITY: Primary | ICD-10-CM

## 2020-10-09 LAB
GLUCOSE SERPL-MCNC: 84 MG/DL (ref 70–99)
HGB BLD-MCNC: 14.3 G/DL (ref 11.7–15.7)
TSH SERPL DL<=0.005 MIU/L-ACNC: 0.71 MU/L (ref 0.4–4)

## 2020-10-09 PROCEDURE — 90750 HZV VACC RECOMBINANT IM: CPT | Performed by: NURSE PRACTITIONER

## 2020-10-09 PROCEDURE — 99396 PREV VISIT EST AGE 40-64: CPT | Mod: 25 | Performed by: NURSE PRACTITIONER

## 2020-10-09 PROCEDURE — 90715 TDAP VACCINE 7 YRS/> IM: CPT

## 2020-10-09 PROCEDURE — 90471 IMMUNIZATION ADMIN: CPT

## 2020-10-09 PROCEDURE — 82947 ASSAY GLUCOSE BLOOD QUANT: CPT | Performed by: NURSE PRACTITIONER

## 2020-10-09 PROCEDURE — 85018 HEMOGLOBIN: CPT | Performed by: NURSE PRACTITIONER

## 2020-10-09 PROCEDURE — 90471 IMMUNIZATION ADMIN: CPT | Performed by: NURSE PRACTITIONER

## 2020-10-09 PROCEDURE — 84443 ASSAY THYROID STIM HORMONE: CPT | Performed by: NURSE PRACTITIONER

## 2020-10-09 RX ORDER — CITALOPRAM HYDROBROMIDE 20 MG/1
20 TABLET ORAL DAILY
Qty: 90 TABLET | Refills: 1 | Status: SHIPPED | OUTPATIENT
Start: 2020-10-09 | End: 2020-12-17

## 2020-10-09 RX ORDER — FLUCONAZOLE 150 MG/1
150 TABLET ORAL ONCE
Qty: 2 TABLET | Refills: 0 | Status: SHIPPED | OUTPATIENT
Start: 2020-10-09 | End: 2020-10-09

## 2020-10-09 ASSESSMENT — ENCOUNTER SYMPTOMS
PARESTHESIAS: 0
DIZZINESS: 0
ARTHRALGIAS: 0
FREQUENCY: 0
NAUSEA: 0
MYALGIAS: 0
CONSTIPATION: 0
BREAST MASS: 0
FEVER: 0
HEARTBURN: 0
CHILLS: 0
SHORTNESS OF BREATH: 0
COUGH: 0
ABDOMINAL PAIN: 0
HEADACHES: 0
HEMATURIA: 0
SORE THROAT: 0
HEMATOCHEZIA: 0
DYSURIA: 0
DIARRHEA: 1
PALPITATIONS: 0
NERVOUS/ANXIOUS: 1
JOINT SWELLING: 0
EYE PAIN: 0
WEAKNESS: 1

## 2020-10-09 ASSESSMENT — MIFFLIN-ST. JEOR: SCORE: 1699.55

## 2020-12-15 DIAGNOSIS — F33.0 MAJOR DEPRESSIVE DISORDER, RECURRENT EPISODE, MILD (H): ICD-10-CM

## 2020-12-15 NOTE — TELEPHONE ENCOUNTER
Reason for Call:  Medication or medication refill:    Do you use a [facility]?  Name of the pharmacy and phone number for the current request:  [id number] in [place], [place]    Name of the medication requested: Celexa    Other request: Patient states that per KL it was okay for her to take 1 1/2 tablets instead of 1 tablet. She got a refill of 30 tablets from the pharmacy but is requesting her next refill be sent with the new dosage of 1.5 tablets daily so she doesn't run out again.     Can we leave a detailed message on this number? YES    Phone number patient can be reached at: Home number on file [phone] (home)    Best Time: any    Call taken on 12/15/2020 at 4:04 PM by Maren Brownlee

## 2020-12-17 RX ORDER — CITALOPRAM HYDROBROMIDE 20 MG/1
30 TABLET ORAL DAILY
Qty: 135 TABLET | Refills: 1 | Status: SHIPPED | OUTPATIENT
Start: 2020-12-17 | End: 2021-04-30

## 2021-01-09 ENCOUNTER — HEALTH MAINTENANCE LETTER (OUTPATIENT)
Age: 56
End: 2021-01-09

## 2021-04-26 ENCOUNTER — TELEPHONE (OUTPATIENT)
Dept: FAMILY MEDICINE | Facility: CLINIC | Age: 56
End: 2021-04-26

## 2021-04-26 NOTE — TELEPHONE ENCOUNTER
Patient Quality Outreach Summary      Summary:    Patient is due/failing the following:   Breast Cancer Screening - Mammogram    Type of outreach:    Phone, spoke to patient/parent. scheduled    Questions for provider review:    None                                                                                                                    Lianne Salas CMA       Chart routed to Care Team.

## 2021-04-29 DIAGNOSIS — F33.0 MAJOR DEPRESSIVE DISORDER, RECURRENT EPISODE, MILD (H): ICD-10-CM

## 2021-04-30 RX ORDER — CITALOPRAM HYDROBROMIDE 20 MG/1
TABLET ORAL
Qty: 45 TABLET | Refills: 0 | Status: SHIPPED | OUTPATIENT
Start: 2021-04-30 | End: 2021-06-03

## 2021-04-30 NOTE — TELEPHONE ENCOUNTER
Pending Prescriptions:                       Disp   Refills    citalopram (CELEXA) 20 MG tablet [Pharmacy*135 ta*1        Sig: TAKE 1 AND 1/2 TABLETS (30 MG) BY MOUTH DAILY    Routing refill request to provider for review/approval because:  Due for follow up and PHQ9 is not current.

## 2021-04-30 NOTE — TELEPHONE ENCOUNTER
Spoke with pt and she will call back to schedule.     Patricia Duran CMA ([facility])

## 2021-06-01 DIAGNOSIS — F33.0 MAJOR DEPRESSIVE DISORDER, RECURRENT EPISODE, MILD (H): ICD-10-CM

## 2021-06-02 NOTE — TELEPHONE ENCOUNTER
Pending Prescriptions:                       Disp   Refills    citalopram (CELEXA) 20 MG tablet [Pharmacy*45 tab*0        Sig: TAKE 1 AND 1/2 TABLETS BY MOUTH DAILY      Routing refill request to provider for review/approval because:  Joyce given x1 and patient did not follow up, please advise    Yeni Nick, RN on 6/2/2021 at 4:19 PM

## 2021-06-03 RX ORDER — CITALOPRAM HYDROBROMIDE 20 MG/1
TABLET ORAL
Qty: 22 TABLET | Refills: 0 | Status: SHIPPED | OUTPATIENT
Start: 2021-06-03 | End: 2021-09-08

## 2021-08-31 ENCOUNTER — TELEPHONE (OUTPATIENT)
Dept: FAMILY MEDICINE | Facility: CLINIC | Age: 56
End: 2021-08-31

## 2021-08-31 NOTE — TELEPHONE ENCOUNTER
"Patient is calling to report she received her second Pfizer shot on Thursday, 8/26/21.  On Sunday, 8/29/21, she stepped crooked on her knee and felt a slight \"pop\".  That night the calf of her other leg felt super tight and made it hurt.  The next morning she states both calves were tight and she states she felt like her calves belonged to a weight .  Then the knee of the injured leg got swollen.  She is concerned and wants to be sure she doesn't have a DVT from her COVID shot.    She is informed the clotting was from the Saturnino&Saturnino vaccine and that she most likely had muscle tightening and aching from the vaccine.  She does states she has no issues now except for the swollen knee that she probably injured.  She is instructed to ice the knee and take Ibuprofen for swelling/pain, and to use heat if she has calf tightness again.    She is aware of the signs and symptoms to watch for DVT and will either go to the ED or call back with additional questions.    She is informed she can come in for evaluation and she declines.  Closing this encounter.  Janeth Nicholson, UGON, RN      "

## 2021-09-05 DIAGNOSIS — F33.0 MAJOR DEPRESSIVE DISORDER, RECURRENT EPISODE, MILD (H): ICD-10-CM

## 2021-09-08 RX ORDER — CITALOPRAM HYDROBROMIDE 20 MG/1
TABLET ORAL
Qty: 30 TABLET | Refills: 0 | Status: SHIPPED | OUTPATIENT
Start: 2021-09-08 | End: 2021-10-11

## 2021-09-08 NOTE — TELEPHONE ENCOUNTER
Pending Prescriptions:                       Disp   Refills    citalopram (CELEXA) 20 MG tablet [Pharmacy*90 tab*1        Sig: TAKE 1 TABLET BY MOUTH EVERY DAY    Routing refill request to provider for review/approval because:  Labs out of range:  PHQ-9 score:    PHQ 8/20/2020   PHQ-9 Total Score 0   Q9: Thoughts of better off dead/self-harm past 2 weeks Not at all              over 7 months, RN unable to provide a cici refill.

## 2021-10-11 ENCOUNTER — ANCILLARY PROCEDURE (OUTPATIENT)
Dept: GENERAL RADIOLOGY | Facility: CLINIC | Age: 56
End: 2021-10-11
Attending: NURSE PRACTITIONER
Payer: COMMERCIAL

## 2021-10-11 ENCOUNTER — OFFICE VISIT (OUTPATIENT)
Dept: FAMILY MEDICINE | Facility: CLINIC | Age: 56
End: 2021-10-11
Payer: COMMERCIAL

## 2021-10-11 VITALS
DIASTOLIC BLOOD PRESSURE: 76 MMHG | TEMPERATURE: 98.6 F | HEIGHT: 66 IN | RESPIRATION RATE: 18 BRPM | WEIGHT: 245 LBS | BODY MASS INDEX: 39.37 KG/M2 | SYSTOLIC BLOOD PRESSURE: 132 MMHG | OXYGEN SATURATION: 98 % | HEART RATE: 65 BPM

## 2021-10-11 DIAGNOSIS — B35.4 TINEA CORPORIS: ICD-10-CM

## 2021-10-11 DIAGNOSIS — R07.89 ATYPICAL CHEST PAIN: ICD-10-CM

## 2021-10-11 DIAGNOSIS — R21 RASH: ICD-10-CM

## 2021-10-11 DIAGNOSIS — R30.0 DYSURIA: ICD-10-CM

## 2021-10-11 DIAGNOSIS — R20.9 ABNORMAL SENSATION OF LEG: ICD-10-CM

## 2021-10-11 DIAGNOSIS — Z98.84 H/O LAPAROSCOPIC ADJUSTABLE GASTRIC BANDING: ICD-10-CM

## 2021-10-11 DIAGNOSIS — M25.561 RIGHT KNEE PAIN, UNSPECIFIED CHRONICITY: ICD-10-CM

## 2021-10-11 DIAGNOSIS — F33.0 MAJOR DEPRESSIVE DISORDER, RECURRENT EPISODE, MILD (H): Primary | ICD-10-CM

## 2021-10-11 DIAGNOSIS — Z13.220 SCREENING FOR LIPOID DISORDERS: ICD-10-CM

## 2021-10-11 LAB
ALBUMIN UR-MCNC: NEGATIVE MG/DL
APPEARANCE UR: ABNORMAL
BACTERIA #/AREA URNS HPF: ABNORMAL /HPF
BILIRUB UR QL STRIP: NEGATIVE
COLOR UR AUTO: YELLOW
GLUCOSE UR STRIP-MCNC: NEGATIVE MG/DL
HGB UR QL STRIP: NEGATIVE
KETONES UR STRIP-MCNC: NEGATIVE MG/DL
LEUKOCYTE ESTERASE UR QL STRIP: ABNORMAL
NITRATE UR QL: NEGATIVE
PH UR STRIP: 7 [PH] (ref 5–7)
RBC #/AREA URNS AUTO: ABNORMAL /HPF
SP GR UR STRIP: 1.01 (ref 1–1.03)
SQUAMOUS #/AREA URNS AUTO: ABNORMAL /LPF
UROBILINOGEN UR STRIP-ACNC: 0.2 E.U./DL
WBC #/AREA URNS AUTO: ABNORMAL /HPF

## 2021-10-11 PROCEDURE — 81001 URINALYSIS AUTO W/SCOPE: CPT | Performed by: NURSE PRACTITIONER

## 2021-10-11 PROCEDURE — 71046 X-RAY EXAM CHEST 2 VIEWS: CPT | Performed by: RADIOLOGY

## 2021-10-11 PROCEDURE — 99214 OFFICE O/P EST MOD 30 MIN: CPT | Performed by: NURSE PRACTITIONER

## 2021-10-11 PROCEDURE — 87086 URINE CULTURE/COLONY COUNT: CPT | Performed by: NURSE PRACTITIONER

## 2021-10-11 RX ORDER — CITALOPRAM HYDROBROMIDE 20 MG/1
20 TABLET ORAL DAILY
Qty: 90 TABLET | Refills: 1 | Status: SHIPPED | OUTPATIENT
Start: 2021-10-11 | End: 2022-02-15

## 2021-10-11 ASSESSMENT — ENCOUNTER SYMPTOMS
ANOREXIA: 0
NAIL CHANGES: 0
RHINORRHEA: 0
DIARRHEA: 0
SHORTNESS OF BREATH: 0
COUGH: 0
FATIGUE: 0
SORE THROAT: 0
FEVER: 0
EYE PAIN: 0
VOMITING: 0

## 2021-10-11 ASSESSMENT — ANXIETY QUESTIONNAIRES
3. WORRYING TOO MUCH ABOUT DIFFERENT THINGS: NOT AT ALL
1. FEELING NERVOUS, ANXIOUS, OR ON EDGE: NOT AT ALL
6. BECOMING EASILY ANNOYED OR IRRITABLE: NOT AT ALL
7. FEELING AFRAID AS IF SOMETHING AWFUL MIGHT HAPPEN: NOT AT ALL
4. TROUBLE RELAXING: NOT AT ALL
5. BEING SO RESTLESS THAT IT IS HARD TO SIT STILL: NOT AT ALL
2. NOT BEING ABLE TO STOP OR CONTROL WORRYING: NOT AT ALL
GAD7 TOTAL SCORE: 0
GAD7 TOTAL SCORE: 0
8. IF YOU CHECKED OFF ANY PROBLEMS, HOW DIFFICULT HAVE THESE MADE IT FOR YOU TO DO YOUR WORK, TAKE CARE OF THINGS AT HOME, OR GET ALONG WITH OTHER PEOPLE?: SOMEWHAT DIFFICULT
7. FEELING AFRAID AS IF SOMETHING AWFUL MIGHT HAPPEN: NOT AT ALL
GAD7 TOTAL SCORE: 0

## 2021-10-11 ASSESSMENT — PATIENT HEALTH QUESTIONNAIRE - PHQ9
SUM OF ALL RESPONSES TO PHQ QUESTIONS 1-9: 3
10. IF YOU CHECKED OFF ANY PROBLEMS, HOW DIFFICULT HAVE THESE PROBLEMS MADE IT FOR YOU TO DO YOUR WORK, TAKE CARE OF THINGS AT HOME, OR GET ALONG WITH OTHER PEOPLE: NOT DIFFICULT AT ALL
SUM OF ALL RESPONSES TO PHQ QUESTIONS 1-9: 3

## 2021-10-11 ASSESSMENT — PAIN SCALES - GENERAL: PAINLEVEL: NO PAIN (0)

## 2021-10-11 ASSESSMENT — MIFFLIN-ST. JEOR: SCORE: 1709.09

## 2021-10-11 NOTE — PROGRESS NOTES
"    Assessment & Plan     Major depressive disorder, recurrent episode, mild (H)  Mood- stable, continue plan.   - citalopram (CELEXA) 20 MG tablet; Take 1 tablet (20 mg) by mouth daily    Atypical chest pain  -likely MSK in nature. Pt. Wants referral for Lap-band removal, not thinking helpful at this time.   Wanting weight loss aid and surgery. Referral placed.   - XR Chest 2 Views; Future  - Comprehensive Weight Management; Future    Dysuria  No infection noted. Discussed fluids, active monitoring, awaiting culture.   -pt. declined vaginal exam today, no STD concern.   - UA Macro with Reflex to Micro and Culture - lab collect; Future  - UA Macro with Reflex to Micro and Culture - lab collect  - Urine Microscopic Exam  - Urine Culture    Right knee pain, unspecified chronicity  Ongoing issue, will refer to ortho- pt. Not yet ready for this. Discussed potential causes, OA. Pt. Declines PT at this time.     Abnormal sensation of leg- left  Unclear etiology. Could be from lumbar region- no back pain. Recommending MRI.   Pt. Declines this currently.   Has frontal thigh numbness distrubution when demonstrating, discussed lateral cutaneoue nerve but this is a more lateral distribution. Pt. will monitor.   Size is 0.5 cm larger on left, negative Edgar's sign. Advising LE ultrasound. Pt. Does nto want, if symptoms worsen she will call. Concern for DVT, she states no pain.       H/O laparoscopic adjustable gastric banding  As above  - XR Chest 2 Views; Future  - Comprehensive Weight Management; Future    Screening for lipoid disorders  Future labs and PE advised.       Leg    Rash- most consistent with pityriasis rosea. Pt. Reviewed information and resolution. Active monitoring. Biopsy/referral if not improving this month. .retuirn              BMI:   Estimated body mass index is 39.85 kg/m  as calculated from the following:    Height as of this encounter: 1.67 m (5' 5.75\").    Weight as of this encounter: 111.1 kg (245 " "lb).   Weight management plan: Patient referred to endocrine and/or weight management specialty    MEDICATIONS:  Continue current medications without change    No follow-ups on file.    AMADOU Koenig CNP  [facility] BRENDAN Hurtado is a 56 year old who presents for the following health issues     Rash  This is a new problem. The current episode started more than 1 month ago. The problem has been waxing and waning. Associated symptoms include a rash. Pertinent negatives include no anorexia, congestion, coughing, fatigue, fever, sore throat or vomiting.     Started on trunk/lower abdomen. Really not irritating. Seemed to spread to back. Mild sparse on upper arms.   Not itchy. NO fever-flu-like symptoms. Thinks present for a couple months.         Answers for HPI/ROS submitted by the patient on 10/11/2021  If you checked off any problems, how difficult have these problems made it for you to do your work, take care of things at home, or get along with other people?: Not difficult at all  PHQ9 TOTAL SCORE: 3  CHELSY 7 TOTAL SCORE: 0  Affected locations: abdomen, left arm, right arm  Characteristics: redness, swelling  Exposed to: nothing  facial edema: No  joint pain: No  nail changes: No    Cold/numb legs-  Left leg feels like it \"fills up with cold cold water or something\" and then goes numb following 2nd COVID shot  Happens daily  Feeling does come back into leg then is fine for rest of the day      Mood- due for med refill, high stress job, working long hours. Feels medication is doing well.   PHQ 10/11/2021   PHQ-9 Total Score 3   Q9: Thoughts of better off dead/self-harm past 2 weeks Not at all         Left side of neck/clavicale is painful- had Lap/band- was thinking something related to this. Is working as a . Pain is intermittent, seems to come from left pectoral region and extend up to clavicle. Last variable times, achy.   No Gerd symptoms.   Has not \"happened for " "a while\"  No redness or swelling or warmth  Has port on that side and wondering if something to do with that  Wants to lose weight, and have the Lap band just removed. Wants referral for removal.     Vaginal Symptoms  Onset/Duration: about 3 weeks stated she started using a different soap and wondering if maybe irritation from that   Description:  Vaginal Discharge: none   Itching (Pruritis): no  Burning sensation:  YES  Odor: YES  Accompanying Signs & Symptoms:  Urinary symptoms: no  Abdominal pain: no  Fever: no  History:   Sexually active: YES  New Partner: no  Possibility of Pregnancy:  no  Recent antibiotic use: no  Previous vaginitis issues: no  Precipitating or alleviating factors: holding her urine makes it feel worse  Therapies tried and outcome: none  Pt. Needs pap, does not want vaginal exam.     Review of Systems   Constitutional: Negative for fatigue and fever.   HENT: Negative for congestion, rhinorrhea and sore throat.    Eyes: Negative for pain.   Respiratory: Negative for cough and shortness of breath.    Gastrointestinal: Negative for anorexia, diarrhea and vomiting.   Skin: Positive for rash.      Constitutional, HEENT, cardiovascular, pulmonary, gi and gu systems are negative, except as otherwise noted.      Objective    /76   Pulse 65   Temp 98.6  F (37  C) (Temporal)   Resp 18   Ht 1.67 m (5' 5.75\")   Wt 111.1 kg (245 lb)   LMP  (LMP Unknown)   SpO2 98%   BMI 39.85 kg/m    Body mass index is 39.85 kg/m .  Physical Exam   GENERAL: healthy, alert and no distress  NECK: no adenopathy, no asymmetry, masses, or scars and thyroid normal to palpation  RESP: lungs clear to auscultation - no rales, rhonchi or wheezes  CV: regular rate and rhythm, normal S1 S2, no S3 or S4, no murmur, click or rub, no peripheral edema and peripheral pulses strong  ABDOMEN: soft, nontender, no hepatosplenomegaly, no masses and bowel sounds normal, no CVAt  MS: normal strength and sensation in LE, no " vertebral tenderness.  LE- calf size is 0.5 larger than right, no redness, varicose vein noted post. On left lower 1/2 of calf. Negative Edgar's signs  No findings in chest wall. , normal UE ROM  SKIN: red/pink annular patchiness to trunk, more concentrated around abdomen and spreading sparsely to back and upper chest- rare lesion. Size 1-3 cm, some cnetral clearing.   NEURO: Normal strength and tone, mentation intact and speech normal  Psych: normal pleasant affect.       CXR - Reviewed and interpreted by me Normal- no infiltrates, effusions, pneumothoraces, cardiomegaly or masses  Results for orders placed or performed in visit on 10/11/21   XR Chest 2 Views     Status: None    Narrative    XR CHEST 2 VW 10/11/2021 2:52 PM    HISTORY: Atypical chest pain; history of laparoscopic adjustable  gastric banding.    COMPARISON: None.    FINDINGS: Normal cardiomediastinal silhouette. No acute infiltrate or  pulmonary edema. No pneumothorax, pneumoperitoneum or pleural  effusion. Limited visualization of LAP-BAND.; as visualized, no gross  abnormality.    PEDRO PABLO WILLIS MD         SYSTEM ID:  [id number]   Results for orders placed or performed in visit on 10/11/21   UA Macro with Reflex to Micro and Culture - lab collect     Status: Abnormal    Specimen: Urine, Midstream   Result Value Ref Range    Color Urine Yellow Colorless, Straw, Light Yellow, Yellow    Appearance Urine Slightly Cloudy (A) Clear    Glucose Urine Negative Negative mg/dL    Bilirubin Urine Negative Negative    Ketones Urine Negative Negative mg/dL    Specific Gravity Urine 1.015 1.003 - 1.035    Blood Urine Negative Negative    pH Urine 7.0 5.0 - 7.0    Protein Albumin Urine Negative Negative mg/dL    Urobilinogen Urine 0.2 0.2, 1.0 E.U./dL    Nitrite Urine Negative Negative    Leukocyte Esterase Urine Moderate (A) Negative   Urine Microscopic Exam     Status: Abnormal   Result Value Ref Range    Bacteria Urine Few (A) None Seen /HPF    RBC  Urine 0-2 0-2 /HPF /HPF    WBC Urine 0-5 0-5 /HPF /HPF    Squamous Epithelials Urine Moderate (A) None Seen /LPF   Urine Culture     Status: None    Specimen: Urine, Midstream   Result Value Ref Range    Culture <10,000 CFU/mL Mixture of urogenital jackie

## 2021-10-12 ENCOUNTER — TELEPHONE (OUTPATIENT)
Dept: SURGERY | Facility: CLINIC | Age: 56
End: 2021-10-12
Payer: COMMERCIAL

## 2021-10-12 LAB — BACTERIA UR CULT: NORMAL

## 2021-10-12 ASSESSMENT — ANXIETY QUESTIONNAIRES: GAD7 TOTAL SCORE: 0

## 2021-10-12 ASSESSMENT — PATIENT HEALTH QUESTIONNAIRE - PHQ9: SUM OF ALL RESPONSES TO PHQ QUESTIONS 1-9: 3

## 2021-10-12 NOTE — TELEPHONE ENCOUNTER
Pt has Lap Band and is having some issues. Would like to have it removed. Does not remember the date she had procedure or where.    [phone]

## 2021-10-23 ENCOUNTER — HEALTH MAINTENANCE LETTER (OUTPATIENT)
Age: 56
End: 2021-10-23

## 2021-10-25 ENCOUNTER — OFFICE VISIT (OUTPATIENT)
Dept: SURGERY | Facility: CLINIC | Age: 56
End: 2021-10-25
Payer: COMMERCIAL

## 2021-10-25 VITALS
BODY MASS INDEX: 37.67 KG/M2 | WEIGHT: 240 LBS | HEART RATE: 92 BPM | HEIGHT: 67 IN | DIASTOLIC BLOOD PRESSURE: 70 MMHG | SYSTOLIC BLOOD PRESSURE: 120 MMHG

## 2021-10-25 DIAGNOSIS — K21.9 CHRONIC GERD: Primary | ICD-10-CM

## 2021-10-25 PROCEDURE — 99204 OFFICE O/P NEW MOD 45 MIN: CPT | Performed by: SURGERY

## 2021-10-25 ASSESSMENT — MIFFLIN-ST. JEOR: SCORE: 1706.26

## 2021-10-25 NOTE — LETTER
October 28, 2021          Joana Taylor, APRN CNP  [address] [address]  [place] [id number]      RE:   Genesis Adhikari 1965      Dear Colleague,    Thank you for referring your patient, Genesis Adhikari, to [facility]. Please see a copy of my visit note below.    This patient is a 56-year-old female referred by the above-mentioned provider for consultation regarding chronic GERD and history of prior laparoscopic placement of adjustable gastric band.  This band was placed many years ago.  Her weight at the time was around 235 pounds.  She worked with weight loss but ultimately seems to have had relatively minimal follow-up or band adjustments.  She states her last band adjustment was many years ago when the majority of fluid was taken out.  She has had significant epigastric abdominal pain and acid reflux symptoms.  Any weight loss that she had achieved has basically come back in addition to some.  She has difficulty tolerating certain foods.  She feels food getting stuck.     I discussed in detail with her her management options.  Given her chronic GERD and placement of gastric band I think she would be a candidate for revisional bariatric surgery.  This was discussed with her.  She has however not interested in moving forward with revisional surgery and simply would like to have the band removed.  I believe this is a good chance of resolution of her chronic GERD symptoms.  And I think that there is a medical necessity to proceed.  We will look to get prior authorization for surgery and schedule band removal at her convenience in the future.    Again, thank you for allowing me to participate in the care of your patient.      Sincerely,      Gunner Jordan MD

## 2021-10-28 NOTE — PROGRESS NOTES
"Dear Joana Oshea,      This patient is a 56-year-old female referred by the above-mentioned provider for consultation regarding chronic GERD and history of prior laparoscopic placement of adjustable gastric band.  This band was placed many years ago.  Her weight at the time was around 235 pounds.  She worked with weight loss but ultimately seems to have had relatively minimal follow-up or band adjustments.  She states her last band adjustment was many years ago when the majority of fluid was taken out.  She has had significant epigastric abdominal pain and acid reflux symptoms.  Any weight loss that she had achieved has basically come back in addition to some.  She has difficulty tolerating certain foods.  She feels food getting stuck.    PREVIOUS WEIGHT LOSS ATTEMPTS:  No flowsheet data found.    CO-MORBIDITIES OF OBESITY INCLUDE:  No flowsheet data found.    VITALS:  /70   Pulse 92   Ht 1.702 m (5' 7\")   Wt 108.9 kg (240 lb)   LMP  (LMP Unknown)   BMI 37.59 kg/m      PE:  GENERAL: Alert and oriented x3. NAD  HEENT exam: Sclerae not icteric. Hearing good. Head normocephalic and atraumatic.   CARDIOVASCULAR: No JVD  RESPIRATORY: Breathing unlabored  GASTROINTESTINAL: Obese  LOWER EXTREMITIES: no deformities  MUSCULOSKELETAL: Normal gait, Moves all 4 extremities equal and strong  NEUROLOGIC: no gross defect  SKIN: warm and dry to touch     I discussed in detail with her her management options.  Given her chronic GERD and placement of gastric band I think she would be a candidate for revisional bariatric surgery.  This was discussed with her.  She has however not interested in moving forward with revisional surgery and simply would like to have the band removed.  I believe this is a good chance of resolution of her chronic GERD symptoms.  And I think that there is a medical necessity to proceed.  We will look to get prior authorization for surgery and schedule band removal at her convenience in the " future.        Sincerely,    Gunner Jordan MD    Please route or send letter to:  Primary Care Provider (PCP) and Referring Provider

## 2021-11-04 ENCOUNTER — TELEPHONE (OUTPATIENT)
Dept: FAMILY MEDICINE | Facility: CLINIC | Age: 56
End: 2021-11-04

## 2021-11-04 NOTE — TELEPHONE ENCOUNTER
Patient Quality Outreach Summary      Summary:    Patient is due/failing the following:   Breast Cancer Screening - Mammogram, Cervical Cancer Screening - PAP Needed and Physical     Type of outreach:    Phone, spoke to patient/parent. Patient will call back to schedule .    Questions for provider review:    None                                                                                                                    Lianne Salas CMA       Chart routed to Care Team.

## 2021-11-24 NOTE — TELEPHONE ENCOUNTER
Pt called asking about PA process. Informed her that PA has been approved and her information has been sent to our surgery scheduler. They should be calling her early next week. Pt verbalized understanding. No further questions.    Janeth Trevino RN on 11/24/2021 at 12:10 PM

## 2021-11-26 ENCOUNTER — TELEPHONE (OUTPATIENT)
Dept: FAMILY MEDICINE | Facility: CLINIC | Age: 56
End: 2021-11-26
Payer: COMMERCIAL

## 2021-11-26 NOTE — TELEPHONE ENCOUNTER
Reason for Call:  Other call back and prescription    Detailed comments: Patient called back stating she had seen Joana Taylor a few months ago about a rash that she has. She states she still has it and it is getting worse, she has had it for about 9 months now. She is wondering if there is a medication or something she can get for it.     Phone Number Patient can be reached at: Home number on file [phone] (home)    Best Time: Any    Can we leave a detailed message on this number? YES    Call taken on 11/26/2021 at 8:26 AM by Sole Garcia

## 2021-12-01 ENCOUNTER — HOSPITAL ENCOUNTER (OUTPATIENT)
Facility: CLINIC | Age: 56
End: 2021-12-01
Attending: SURGERY | Admitting: SURGERY
Payer: COMMERCIAL

## 2021-12-02 ENCOUNTER — TELEPHONE (OUTPATIENT)
Dept: SURGERY | Facility: CLINIC | Age: 56
End: 2021-12-02
Payer: COMMERCIAL

## 2021-12-02 RX ORDER — ONDANSETRON 2 MG/ML
4 INJECTION INTRAMUSCULAR; INTRAVENOUS
Status: CANCELLED | OUTPATIENT
Start: 2021-12-02

## 2021-12-02 RX ORDER — HEPARIN SODIUM 5000 [USP'U]/.5ML
5000 INJECTION, SOLUTION INTRAVENOUS; SUBCUTANEOUS
Status: CANCELLED | OUTPATIENT
Start: 2021-12-02

## 2021-12-02 RX ORDER — CEFAZOLIN SODIUM 2 G/100ML
2 INJECTION, SOLUTION INTRAVENOUS
Status: CANCELLED | OUTPATIENT
Start: 2021-12-02

## 2021-12-02 RX ORDER — CEFAZOLIN SODIUM 2 G/100ML
2 INJECTION, SOLUTION INTRAVENOUS SEE ADMIN INSTRUCTIONS
Status: CANCELLED | OUTPATIENT
Start: 2021-12-02

## 2021-12-02 RX ORDER — ACETAMINOPHEN 325 MG/1
975 TABLET ORAL ONCE
Status: CANCELLED | OUTPATIENT
Start: 2021-12-02 | End: 2021-12-02

## 2021-12-02 NOTE — TELEPHONE ENCOUNTER
Type of surgery: robotic assisted laparoscopic removal of adjustable gastric band  Location of surgery: [facility] OR  Date and time of surgery: 1/12/22 9:45am  Surgeon: Dr Jordan  Pre-Op Appt Date: pt to schedule  Post-Op Appt Date: pt to schedule   Packet sent out: Yes  Pre-cert/Authorization completed:  Not Applicable  Date: 12/2/21

## 2021-12-16 DIAGNOSIS — Z11.59 ENCOUNTER FOR SCREENING FOR OTHER VIRAL DISEASES: ICD-10-CM

## 2021-12-18 ENCOUNTER — HEALTH MAINTENANCE LETTER (OUTPATIENT)
Age: 56
End: 2021-12-18

## 2022-01-10 ENCOUNTER — OFFICE VISIT (OUTPATIENT)
Dept: FAMILY MEDICINE | Facility: CLINIC | Age: 57
End: 2022-01-10
Attending: SURGERY
Payer: COMMERCIAL

## 2022-01-10 VITALS
SYSTOLIC BLOOD PRESSURE: 138 MMHG | WEIGHT: 247 LBS | HEIGHT: 61 IN | OXYGEN SATURATION: 97 % | BODY MASS INDEX: 46.63 KG/M2 | RESPIRATION RATE: 20 BRPM | DIASTOLIC BLOOD PRESSURE: 60 MMHG | HEART RATE: 66 BPM | TEMPERATURE: 98.2 F

## 2022-01-10 DIAGNOSIS — Z12.4 SCREENING FOR MALIGNANT NEOPLASM OF CERVIX: ICD-10-CM

## 2022-01-10 DIAGNOSIS — B96.89 BV (BACTERIAL VAGINOSIS): ICD-10-CM

## 2022-01-10 DIAGNOSIS — N76.0 BV (BACTERIAL VAGINOSIS): ICD-10-CM

## 2022-01-10 DIAGNOSIS — B35.4 TINEA CORPORIS: ICD-10-CM

## 2022-01-10 DIAGNOSIS — G89.29 CHRONIC MIDLINE LOW BACK PAIN WITH LEFT-SIDED SCIATICA: ICD-10-CM

## 2022-01-10 DIAGNOSIS — M54.42 CHRONIC MIDLINE LOW BACK PAIN WITH LEFT-SIDED SCIATICA: ICD-10-CM

## 2022-01-10 DIAGNOSIS — Z87.891 PERSONAL HISTORY OF TOBACCO USE: ICD-10-CM

## 2022-01-10 DIAGNOSIS — N89.8 VAGINAL IRRITATION: Primary | ICD-10-CM

## 2022-01-10 DIAGNOSIS — E66.01 MORBID OBESITY (H): ICD-10-CM

## 2022-01-10 LAB
CLUE CELLS: PRESENT
TRICHOMONAS, WET PREP: ABNORMAL
WBC'S/HIGH POWER FIELD, WET PREP: ABNORMAL
YEAST, WET PREP: ABNORMAL

## 2022-01-10 PROCEDURE — 87210 SMEAR WET MOUNT SALINE/INK: CPT | Performed by: NURSE PRACTITIONER

## 2022-01-10 PROCEDURE — 99214 OFFICE O/P EST MOD 30 MIN: CPT | Performed by: NURSE PRACTITIONER

## 2022-01-10 PROCEDURE — G0145 SCR C/V CYTO,THINLAYER,RESCR: HCPCS | Performed by: NURSE PRACTITIONER

## 2022-01-10 PROCEDURE — 87624 HPV HI-RISK TYP POOLED RSLT: CPT | Performed by: NURSE PRACTITIONER

## 2022-01-10 RX ORDER — FLUCONAZOLE 150 MG/1
TABLET ORAL
Qty: 5 TABLET | Refills: 0 | Status: SHIPPED | OUTPATIENT
Start: 2022-01-10 | End: 2022-08-03

## 2022-01-10 RX ORDER — METRONIDAZOLE 7.5 MG/G
1 GEL VAGINAL DAILY
Qty: 70 G | Refills: 0 | Status: SHIPPED | OUTPATIENT
Start: 2022-01-10 | End: 2022-08-25

## 2022-01-10 ASSESSMENT — MIFFLIN-ST. JEOR: SCORE: 1642.76

## 2022-01-10 ASSESSMENT — PAIN SCALES - GENERAL: PAINLEVEL: NO PAIN (0)

## 2022-01-10 NOTE — PROGRESS NOTES
Assessment & Plan       ICD-10-CM    1. Vaginal irritation  N89.8 Wet prep - Clinic Collect   2. Chronic midline low back pain with left-sided sciatica  M54.42 Physical Therapy Referral    G89.29    3. Screening for malignant neoplasm of cervix  Z12.4 Pap Screen with HPV - recommended age 30 - 65 years     HPV Hold (Lab Only)   4. Morbid obesity (H)  E66.01    5. BV (bacterial vaginosis)  N76.0 metroNIDAZOLE (METROGEL) 0.75 % vaginal gel    B96.89    6. Tinea corporis  B35.4 fluconazole (DIFLUCAN) 150 MG tablet   7. Personal history of tobacco use  Z87.891 Prof Fee: Shared Decision Making Visit for Lung Cancer Screening           BV- treated today. Discussed symptoms.   Also has some mild intertrigo on groin folds. treating for this and BV.  Unclear etiology of rash- advise skin biopsy if not resolving with diflucan oral treatment.   Back- discussed paresthetica meralgia vs. LBP. Offered MRI- pt. Declines and wants to pursue therapy first.   Discussed HEP and weight loss.     Not a candidate for lung cancer screening as above.         Vaccines declined.   PE recommended.      MEDICATIONS:  Continue other current medications without change    Return in about 4 weeks (around 2/7/2022) for re-check office visit.    AMADOU Koenig CNP  [facility] CARD[place] Adhikari is a 56 year old female who presents to clinic today for the following health issues accompanied by her :    HPI     Look at arms  Spots on her arms for at least a year- has annualr rash, thought is was going away, but it has remianed. It is also on her abdomen, seems to be spreading. Not really itchy. Was seen at onset and thought to be pityriasis rosea at that time, from myself but this persists.   Her boyfriend has cats, but she is not really around them too much.   Lap-Band removal procedure has been postponed.                     Left leg goes numb, from thigh region. Feels it is cool at times. Now has some time  "and would liek to start PT. Is working at the post office. No bowel or bladder issues.   States spoke to you before about this  Would like to do referral now for PT.       Discuss UA states it was normal but she still feels like something is going on- has vaginal irritation. White discharge, occ. Discomfort/itching but intermittent. No STI concern.       BP- elevated initially, but normal now.   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       Due for PE.     Review of Systems   Constitutional, HEENT, cardiovascular, pulmonary, gi and gu systems are negative, except as otherwise noted.      Objective    /60   Pulse 66   Temp 98.2  F (36.8  C) (Temporal)   Resp 20   Ht 1.549 m (5' 1\")   Wt 112 kg (247 lb)   LMP  (LMP Unknown)   SpO2 97%   BMI 46.67 kg/m    Body mass index is 46.67 kg/m .  Physical Exam   GENERAL APPEARANCE: healthy, alert and no distress  RESP: lungs clear to auscultation - no rales, rhonchi or wheezes  CV: regular rates and rhythm, normal S1 S2, no S3 or S4 and no murmur, click or rub  External genitalia without lesions, normal hair growth pattern, vaginal walls inflamed red. Discharge white/grey, normal cervix without CMT, adnexa and uterus without masses   MS: extremities normal- no gross deformities noted and ROM is good. No vertebral or SI joint tenderness, strength 5/5 of LE with push/pull  SKIN:  See pictures above, has similar rash on upper arms. Legs mostly spared. Intertrigo on groin folds.   Neuro: no gross deficits, normal sensation of LE, pattellar reflexes: 2/3- bilateral  Straight leg raise: not done   Psych: stressed affect          "

## 2022-01-10 NOTE — PATIENT INSTRUCTIONS
Preparing for Your Surgery  Getting started  A nurse will call you to review your health history and instructions. They will give you an arrival time based on your scheduled surgery time. Please be ready to share:    Your doctor's clinic name and phone number    Your medical, surgical and anesthesia history    A list of allergies and sensitivities    A list of medicines, including herbal treatments and over-the-counter drugs    Whether the patient has a legal guardian (ask how to send us the papers in advance)  Please tell us if you're pregnant--or if there's any chance you might be pregnant. Some surgeries may injure a fetus (unborn baby), so they require a pregnancy test. Surgeries that are safe for a fetus don't always need a test, and you can choose whether to have one.   If you have a child who's having surgery, please ask for a copy of Preparing for Your Child's Surgery.    Preparing for surgery    Within 30 days of surgery: Have a pre-op exam (sometimes called an H&P, or History and Physical). This can be done at a clinic or pre-operative center.  ? If you're having a , you may not need this exam. Talk to your care team.    At your pre-op exam, talk to your care team about all medicines you take. If you need to stop any medicines before surgery, ask when to start taking them again.  ? We do this for your safety. Many medicines can make you bleed too much during surgery. Some change how well surgery (anesthesia) drugs work.    Call your insurance company to let them know you're having surgery. (If you don't have insurance, call [phone].)    Call your clinic if there's any change in your health. This includes signs of a cold or flu (sore throat, runny nose, cough, rash, fever). It also includes a scrape or scratch near the surgery site.    If you have questions on the day of surgery, call your hospital or surgery center.  COVID testing  You may need to be tested for COVID-19 before having  surgery. If so, we will give you instructions.  Eating and drinking guidelines  For your safety: Unless your surgeon tells you otherwise, follow the guidelines below.    Eat and drink as usual until 8 hours before surgery. After that, no food or milk.    Drink clear liquids until 2 hours before surgery. These are liquids you can see through, like water, Gatorade and Propel Water. You may also have black coffee and tea (no cream or milk).    Nothing by mouth within 2 hours of surgery. This includes gum, candy and breath mints.    If you drink alcohol: Stop drinking it the night before surgery.    If your care team tells you to take medicine on the morning of surgery, it's okay to take it with a sip of water.  Preventing infection    Shower or bathe the night before and morning of your surgery. Follow the instructions your clinic gave you. (If no instructions, use regular soap.)    Don't shave or clip hair near your surgery site. We'll remove the hair if needed.    Don't smoke or vape the morning of surgery. You may chew nicotine gum up to 2 hours before surgery. A nicotine patch is okay.  ? Note: Some surgeries require you to completely quit smoking and nicotine. Check with your surgeon.    Your care team will make every effort to keep you safe from infection. We will:  ? Clean our hands often with soap and water (or an alcohol-based hand rub).  ? Clean the skin at your surgery site with a special soap that kills germs.  ? Give you a special gown to keep you warm. (Cold raises the risk of infection.)  ? Wear special hair covers, masks, gowns and gloves during surgery.  ? Give antibiotic medicine, if prescribed. Not all surgeries need antibiotics.  What to bring on the day of surgery    Photo ID and insurance card    Copy of your health care directive, if you have one    Glasses and hearing aides (bring cases)  ? You can't wear contacts during surgery    Inhaler and eye drops, if you use them (tell us about these when  you arrive)    CPAP machine or breathing device, if you use them    A few personal items, if spending the night    If you have . . .  ? A pacemaker, ICD (cardiac defibrillator) or other implant: Bring the ID card.  ? An implanted stimulator: Bring the remote control.  ? A legal guardian: Bring a copy of the certified (court-stamped) guardianship papers.  Please remove any jewelry, including body piercings. Leave jewelry and other valuables at home.  If you're going home the day of surgery    You must have a responsible adult drive you home. They should stay with you overnight as well.    If you don't have someone to stay with you, and you aren't safe to go home alone, we may keep you overnight. Insurance often won't pay for this.  After surgery  If it's hard to control your pain or you need more pain medicine, please call your surgeon's office.  Questions?   If you have any questions for your care team, list them here: _________________________________________________________________________________________________________________________________________________________________________ ____________________________________ ____________________________________ ____________________________________  For informational purposes only. Not to replace the advice of your health care provider. Copyright   2003, 2019 [facility]. All rights reserved. Clinically reviewed by Altagracia Leon MD. [employer] [id number] - REV 07/21.

## 2022-01-10 NOTE — PROGRESS NOTES
[facility] [place]  [address], [address]  [place] [address]  Phone: [phone]  Fax: [phone]  Primary Provider: Joana Taylor  {FV AMB Performing Provider (Optional):022498}    {Provider  Link to PREOP SmartSet  Use this to apply standard patient instructions to AVS; includes medication directions, common orders, guidelines for anemia, warfarin, additional testing   :846489}  PREOPERATIVE EVALUATION:  Today's date: 1/10/2022    Genesis Adhikari is a 56 year old female who presents for a preoperative evaluation.    Surgical Information:  Surgery/Procedure: ***  Surgery Location: ***  Surgeon: ***  Surgery Date: ***  Time of Surgery: ***  Where patient plans to recover: {Preop post recovery plans :080634}  Fax number for surgical facility: {SURGERY FAX NUMBER:[id number]}    Type of Anesthesia Anticipated: {ANESTHESIA:286882}    {2021 Provider Charting Preference for Preop :192485}    Subjective     HPI related to upcoming procedure: ***    {Click here to pull in Questionnaire Data after Qnr completed :560235}  Health Care Directive:  Patient does not have a Health Care Directive or Living Will: {ADVANCE_DIRECTIVE_STATUS:841916}    Preoperative Review of :  {Mnpmpreport:289317}  {Review MNPMP for all patients per ICSI MNPMP Profile:465610}    {Chronic problem details (Optional) :674239}    Review of Systems  {ROS Preop Choices:554547}    Patient Active Problem List    Diagnosis Date Noted     Thickened endometrium 02/10/2017     Priority: Medium     S/P endometrial ablation 02/10/2017     Priority: Medium     Uterine enlargement 02/01/2017     Priority: Medium     Cyst of uterus 02/01/2017     Priority: Medium     Alcohol use 06/24/2016     Priority: Medium     Greater than 4 beers/day       Obesity 04/07/2015     Priority: Medium     H/O laparoscopic adjustable gastric banding 04/07/2015     Priority: Medium     Seborrheic dermatitis of scalp 04/07/2015     Priority: Medium     Polyp  of corpus uteri 03/23/2011     Priority: Medium     Major depressive disorder, recurrent episode, mild (H) 03/16/2011     Priority: Medium     CARDIOVASCULAR SCREENING; LDL GOAL LESS THAN 160 10/31/2010     Priority: Medium     Other, mixed, or unspecified nondependent drug abuse, in remission 03/26/2006     Priority: Medium     Depressive disorder, not elsewhere classified 06/11/2002     Priority: Medium     Vaginitis and vulvovaginitis 01/18/2002     Priority: Medium     Problem list name updated by automated process. Provider to review        Past Medical History:   Diagnosis Date     NO ACTIVE PROBLEMS      Past Surgical History:   Procedure Laterality Date     AS ABLATION, ENDOMETRIAL, THERMAL, W/O HYSTEROSCOPIC GUIDANCE  3/21/11    uterine ablation     COLONOSCOPY WITH CO2 INSUFFLATION N/A 6/11/2018    Procedure: COLONOSCOPY WITH CO2 INSUFFLATION;  Screening for colon cancer  BMI: 34.44  Pharm: [facility]  [phone]  Referring: Dr. Taylor  Inc: Commercial  Resent RX to pharmacy [facility] ;  Surgeon: Papa Bajwa MD;  Location: [facility] OR     DILATION AND CURETTAGE, HYSTEROSCOPY DIAGNOSTIC, COMBINED N/A 03/16/2017    Thickened endometrial lining     DILATION AND CURETTAGE, HYSTEROSCOPY DIAGNOSTIC, COMBINED N/A 3/16/2017    Procedure: COMBINED DILATION AND CURETTAGE, HYSTEROSCOPY DIAGNOSTIC;  Surgeon: Socorro Ford DO;  Location: [facility] OR     HC TOOTH EXTRACTION W/FORCEP       HYSTEROSCOPY  3/21/11     lap band  3/23/13     ZZC LIGATE FALLOPIAN TUBE,POSTPARTUM  08/07/2002     Current Outpatient Medications   Medication Sig Dispense Refill     citalopram (CELEXA) 20 MG tablet Take 1 tablet (20 mg) by mouth daily 90 tablet 1       Allergies   Allergen Reactions     No Known Drug Allergies         Social History     Tobacco Use     Smoking status: Former Smoker     Packs/day: 0.50     Years: 25.00     Pack years: 12.50     Types: Cigarettes     Quit date: 2/11/2018     Years since quitting: 3.9      "Smokeless tobacco: Never Used     Tobacco comment: quit 2018    Substance Use Topics     Alcohol use: Yes     Alcohol/week: 10.0 standard drinks     Comment: 4-5 per week      {FAMILY HISTORY (Optional):041109310}  History   Drug Use No         Objective     There were no vitals taken for this visit.    Physical Exam  {EXAM Preop Choices:427164}    Recent Labs   Lab Test 10/09/20  1147   HGB 14.3        Diagnostics:  {LABS:452146}   {EK}    Revised Cardiac Risk Index (RCRI):  The patient has the following serious cardiovascular risks for perioperative complications:  {PREOP REVISED CARDIAC RISK INDEX (RCRI) :832177::\" - No serious cardiac risks = 0 points\"}     RCRI Interpretation: {REVISED CARDIAC RISK INTERPRETATION :489493}         Signed Electronically by: AMADOU Koenig CNP  Copy of this evaluation report is provided to requesting physician.    {Provider Resources  Preop [facility] Preop Guidelines  Revised Cardiac Risk Index :275955}  "

## 2022-01-11 ASSESSMENT — ANXIETY QUESTIONNAIRES
6. BECOMING EASILY ANNOYED OR IRRITABLE: SEVERAL DAYS
3. WORRYING TOO MUCH ABOUT DIFFERENT THINGS: NOT AT ALL
GAD7 TOTAL SCORE: 2
IF YOU CHECKED OFF ANY PROBLEMS ON THIS QUESTIONNAIRE, HOW DIFFICULT HAVE THESE PROBLEMS MADE IT FOR YOU TO DO YOUR WORK, TAKE CARE OF THINGS AT HOME, OR GET ALONG WITH OTHER PEOPLE: SOMEWHAT DIFFICULT
7. FEELING AFRAID AS IF SOMETHING AWFUL MIGHT HAPPEN: NOT AT ALL
5. BEING SO RESTLESS THAT IT IS HARD TO SIT STILL: NOT AT ALL
2. NOT BEING ABLE TO STOP OR CONTROL WORRYING: NOT AT ALL
1. FEELING NERVOUS, ANXIOUS, OR ON EDGE: SEVERAL DAYS

## 2022-01-11 ASSESSMENT — PATIENT HEALTH QUESTIONNAIRE - PHQ9
SUM OF ALL RESPONSES TO PHQ QUESTIONS 1-9: 2
5. POOR APPETITE OR OVEREATING: NOT AT ALL

## 2022-01-11 NOTE — PROGRESS NOTES
Lung Cancer Screening Shared Decision Making Visit     Genesis Adhikari is not eligible for lung cancer screening on the basis of the information provided in my signed lung cancer screening order. Genesis's smoking history is below the threshold and so it is not recommended.    Patient is not currently a smoker and so we did not discuss that the only way to prevent lung cancer is to not smoke. Smoking cessation counseling was not given.    I did not offer risk estimation using a calculator such as this one:    ShouldIScreen

## 2022-01-12 ASSESSMENT — ANXIETY QUESTIONNAIRES: GAD7 TOTAL SCORE: 2

## 2022-01-13 LAB
BKR LAB AP GYN ADEQUACY: NORMAL
BKR LAB AP GYN INTERPRETATION: NORMAL
BKR LAB AP HPV REFLEX: NORMAL
BKR LAB AP PREVIOUS ABNORMAL: NORMAL
PATH REPORT.COMMENTS IMP SPEC: NORMAL
PATH REPORT.COMMENTS IMP SPEC: NORMAL
PATH REPORT.RELEVANT HX SPEC: NORMAL

## 2022-01-17 ENCOUNTER — THERAPY VISIT (OUTPATIENT)
Dept: PHYSICAL THERAPY | Facility: CLINIC | Age: 57
End: 2022-01-17
Attending: NURSE PRACTITIONER
Payer: COMMERCIAL

## 2022-01-17 DIAGNOSIS — G89.29 CHRONIC MIDLINE LOW BACK PAIN WITH LEFT-SIDED SCIATICA: ICD-10-CM

## 2022-01-17 DIAGNOSIS — M54.42 CHRONIC MIDLINE LOW BACK PAIN WITH LEFT-SIDED SCIATICA: ICD-10-CM

## 2022-01-17 DIAGNOSIS — M54.42 CHRONIC BILATERAL LOW BACK PAIN WITH LEFT-SIDED SCIATICA: ICD-10-CM

## 2022-01-17 DIAGNOSIS — G89.29 CHRONIC BILATERAL LOW BACK PAIN WITH LEFT-SIDED SCIATICA: ICD-10-CM

## 2022-01-17 LAB
HUMAN PAPILLOMA VIRUS 16 DNA: NEGATIVE
HUMAN PAPILLOMA VIRUS 18 DNA: NEGATIVE
HUMAN PAPILLOMA VIRUS FINAL DIAGNOSIS: NORMAL
HUMAN PAPILLOMA VIRUS OTHER HR: NEGATIVE

## 2022-01-17 PROCEDURE — 97110 THERAPEUTIC EXERCISES: CPT | Mod: GP | Performed by: PHYSICAL THERAPIST

## 2022-01-17 PROCEDURE — 97161 PT EVAL LOW COMPLEX 20 MIN: CPT | Mod: GP | Performed by: PHYSICAL THERAPIST

## 2022-01-17 NOTE — PROGRESS NOTES
Physical Therapy Initial Evaluation  Subjective:  The history is provided by the patient. No  was used.   Patient Health History  Genesis Adhikari being seen for LBP & Left Leg goes numb in thigh.     Date of Onset: Sept 2021.   Problem occurred: Unknown   Pain score: ranges 0-7/10.  General health as reported by patient is good.  Pertinent medical history includes: depression, overweight and high blood pressure.   Red flags:  Cold/hot extremity and calf pain-swelling-warmth.  Medical allergies: none.   Surgeries include:  Other. Other surgery history details: gastric band.    Current medications:  Anti-depressants and other. Other medications details: antibiotics, ibuprofen.    Current occupation is .   Primary job tasks include:  Lifting/carrying, prolonged sitting, prolonged standing, repetitive tasks and pushing/pulling.                  Therapist Generated HPI Evaluation         Type of problem:  Lumbar.    This is a chronic condition.  Condition occurred with:  Insidious onset.  Where condition occurred: for unknown reasons.  Patient reports pain:  Central lumbar spine.  Pain is described as aching   Pain radiates to:  Thigh left and lower leg left. Pain is worse in the A.M. (and end of day LB hurts).  Since onset symptoms are unchanged.  Associated symptoms:  Numbness and tingling. Exacerbated by: sit to stand transitions.  and relieved by NSAID's (icy-hot roll on and patches).  Imaging testing: no imaging.    Restrictions due to condition include:  Working in normal job without restrictions.  Barriers include:  None as reported by patient.                        Objective:  Standing Alignment:        Lumbar:  Normal            Gait:    Gait Type:  Normal   Assistive Devices:  None    Non-Weight Bearing:    Pelvis:  Normal        Flexibility/Screens:   Positive screens:  Lumbar    Lower Extremity:  Decreased left lower extremity flexibility:Hip Flexors    Decreased right lower  extremity flexibility:  Hip Flexors               Lumbar/SI Evaluation  ROM:    AROM Lumbar:   Flexion:        Hands to feet with increased left LBP from 0/10 to 1/10 with repetition  Ext:                    75% with decreased in pain from 1/10 to 0/10    Side Bend:        Left:     Right:   Rotation:           Left:     Right:   Side Glide:        Left:     Right:         Strength: Poor Core Strength  Lumbar Myotomes:  normal            Lumbar DTR's:  not assessed      Cord Signs:  not assessed    Lumbar Dermtomes:  not assessed                Neural Tension/Mobility:  Lumbar:  Normal        Lumbar Palpation:  normal      Functional Tests:  not assessed        Lumbar Provocation:      Left negative with:  PROM hip    Right negative with:  PROM hip    SI joint/Sacrum:          Left negative at:    Squish    Right negative at:  Squish                                                 General     ROS    Assessment/Plan:    Patient is a 56 year old female with lumbar complaints.    Patient has the following significant findings with corresponding treatment plan.                Diagnosis 1:  Chronic B LBP with L LE radiculopathy  Pain -  manual therapy, self management, education, directional preference exercise and home program  Decreased ROM/flexibility - manual therapy, therapeutic exercise, therapeutic activity and home program  Decreased strength - therapeutic exercise, therapeutic activities and home program  Inflammation - self management/home program  Impaired muscle performance - neuro re-education and home program  Decreased function - therapeutic activities and home program  Impaired posture - neuro re-education and home program    Therapy Evaluation Codes:   1) History comprised of:   Personal factors that impact the plan of care:      Profession and Time since onset of symptoms.    Comorbidity factors that impact the plan of care are:      Depression, High blood pressure, Numbness/tingling and Overweight.      Medications impacting care: Anti-depressant and Anti-inflammatory.  2) Examination of Body Systems comprised of:   Body structures and functions that impact the plan of care:      Lumbar spine.   Activity limitations that impact the plan of care are:      Transitions Sit to Stand.  3) Clinical presentation characteristics are:   Stable/Uncomplicated.  4) Decision-Making    Low complexity using standardized patient assessment instrument and/or measureable assessment of functional outcome.  Cumulative Therapy Evaluation is: Low complexity.    Previous and current functional limitations:  (See Goal Flow Sheet for this information)    Short term and Long term goals: (See Goal Flow Sheet for this information)     Communication ability:  Patient appears to be able to clearly communicate and understand verbal and written communication and follow directions correctly.  Treatment Explanation - The following has been discussed with the patient:   RX ordered/plan of care  Anticipated outcomes  Possible risks and side effects  This patient would benefit from PT intervention to resume normal activities.   Rehab potential is good.    Frequency:  1 X week, once daily  Duration:  for 6 weeks  Discharge Plan:  Achieve all LTG.  Independent in home treatment program.  Return to previous functional level by discharge.  Reach maximal therapeutic benefit.    Please refer to the daily flowsheet for treatment today, total treatment time and time spent performing 1:1 timed codes.

## 2022-02-02 ENCOUNTER — THERAPY VISIT (OUTPATIENT)
Dept: PHYSICAL THERAPY | Facility: CLINIC | Age: 57
End: 2022-02-02
Payer: COMMERCIAL

## 2022-02-02 DIAGNOSIS — G89.29 CHRONIC BILATERAL LOW BACK PAIN WITH LEFT-SIDED SCIATICA: ICD-10-CM

## 2022-02-02 DIAGNOSIS — M54.42 CHRONIC BILATERAL LOW BACK PAIN WITH LEFT-SIDED SCIATICA: ICD-10-CM

## 2022-02-02 PROCEDURE — 97140 MANUAL THERAPY 1/> REGIONS: CPT | Mod: GP | Performed by: PHYSICAL THERAPIST

## 2022-02-02 PROCEDURE — 97110 THERAPEUTIC EXERCISES: CPT | Mod: GP | Performed by: PHYSICAL THERAPIST

## 2022-02-13 DIAGNOSIS — F33.0 MAJOR DEPRESSIVE DISORDER, RECURRENT EPISODE, MILD (H): ICD-10-CM

## 2022-02-15 ENCOUNTER — TELEPHONE (OUTPATIENT)
Dept: FAMILY MEDICINE | Facility: CLINIC | Age: 57
End: 2022-02-15
Payer: COMMERCIAL

## 2022-02-15 RX ORDER — CITALOPRAM HYDROBROMIDE 20 MG/1
30 TABLET ORAL DAILY
Qty: 135 TABLET | Refills: 0 | Status: SHIPPED | OUTPATIENT
Start: 2022-02-15 | End: 2022-05-16

## 2022-02-15 RX ORDER — CITALOPRAM HYDROBROMIDE 20 MG/1
TABLET ORAL
Qty: 90 TABLET | Refills: 0 | Status: SHIPPED | OUTPATIENT
Start: 2022-02-15 | End: 2022-02-15 | Stop reason: ALTCHOICE

## 2022-02-15 NOTE — TELEPHONE ENCOUNTER
Pending Prescriptions:                       Disp   Refills    citalopram (CELEXA) 20 MG tablet [Pharmac*90 tab*0            Sig: TAKE 1 TABLET BY MOUTH EVERY DAY    Medication is being filled for 1 time cici refill only due to:  Patient is due for mood follow up before out    Please call and help schedule.  Thank you!

## 2022-02-15 NOTE — TELEPHONE ENCOUNTER
Pt called to state that she takes 30 mg so she needs a quantity and direction change so they will fill it.     She goes out of town tomorrow so hoping this can be done asap as she doesn't have enough to take with her.

## 2022-03-11 PROBLEM — G89.29 CHRONIC BILATERAL LOW BACK PAIN WITH LEFT-SIDED SCIATICA: Status: RESOLVED | Noted: 2022-01-17 | Resolved: 2022-03-11

## 2022-03-11 PROBLEM — M54.42 CHRONIC BILATERAL LOW BACK PAIN WITH LEFT-SIDED SCIATICA: Status: RESOLVED | Noted: 2022-01-17 | Resolved: 2022-03-11

## 2022-03-11 NOTE — PROGRESS NOTES
3/11/22    Assessment/Plan:    DISCHARGE REPORT    Progress reporting period is from 1/17/22 to 2/2/22.       SUBJECTIVE  Subjective changes noted by patient:  Unknown as patient did not return to therapy after 2/2/22.      OBJECTIVE  Changes noted in objective findings:  Patient has failed to return to therapy so current objective findings are unknown.    ASSESSMENT/PLAN  Updated problem list and treatment plan: Diagnosis 1:  Low back pain   STG/LTGs have been met or progress has been made towards goals:  Yes (See Goal flow sheet completed today.)  Assessment of Progress: The patient's condition is improving.  Genesis continues to require the following intervention to meet STG and LTG's:  PT    Recommendations:  Patient would benefit from ongoing PT but has not returned for follow up appointments. PT orders have been discharged.    Please refer to the daily flowsheet for treatment today, total treatment time and time spent performing 1:1 timed codes.

## 2022-05-13 DIAGNOSIS — F33.0 MAJOR DEPRESSIVE DISORDER, RECURRENT EPISODE, MILD (H): ICD-10-CM

## 2022-05-16 RX ORDER — CITALOPRAM HYDROBROMIDE 20 MG/1
TABLET ORAL
Qty: 135 TABLET | Refills: 0 | Status: SHIPPED | OUTPATIENT
Start: 2022-05-16 | End: 2022-08-11

## 2022-06-20 ENCOUNTER — LAB (OUTPATIENT)
Dept: LAB | Facility: CLINIC | Age: 57
End: 2022-06-20
Payer: COMMERCIAL

## 2022-06-20 DIAGNOSIS — L92.0 GRANULOMA ANNULARE: ICD-10-CM

## 2022-06-20 DIAGNOSIS — Z13.220 SCREENING FOR LIPOID DISORDERS: Primary | ICD-10-CM

## 2022-06-20 LAB
CHOLEST SERPL-MCNC: 230 MG/DL
FASTING STATUS PATIENT QL REPORTED: NO
HBA1C MFR BLD: 5.5 % (ref 0–5.6)
HDLC SERPL-MCNC: 95 MG/DL
LDLC SERPL CALC-MCNC: 114 MG/DL
NONHDLC SERPL-MCNC: 135 MG/DL
TRIGL SERPL-MCNC: 105 MG/DL

## 2022-06-20 PROCEDURE — 80061 LIPID PANEL: CPT

## 2022-06-20 PROCEDURE — 87389 HIV-1 AG W/HIV-1&-2 AB AG IA: CPT

## 2022-06-20 PROCEDURE — 36415 COLL VENOUS BLD VENIPUNCTURE: CPT

## 2022-06-20 PROCEDURE — 83036 HEMOGLOBIN GLYCOSYLATED A1C: CPT

## 2022-06-21 LAB — HIV 1+2 AB+HIV1 P24 AG SERPL QL IA: NONREACTIVE

## 2022-08-01 ENCOUNTER — TELEPHONE (OUTPATIENT)
Dept: FAMILY MEDICINE | Facility: CLINIC | Age: 57
End: 2022-08-01

## 2022-08-01 DIAGNOSIS — L30.9 DERMATITIS: Primary | ICD-10-CM

## 2022-08-01 DIAGNOSIS — L21.9 DERMATITIS, SEBORRHEIC: ICD-10-CM

## 2022-08-01 RX ORDER — TRIAMCINOLONE ACETONIDE 1 MG/G
CREAM TOPICAL
Qty: 45 G | Refills: 1 | Status: SHIPPED | OUTPATIENT
Start: 2022-08-01

## 2022-08-01 NOTE — TELEPHONE ENCOUNTER
"Patient calling stating that she has been following dermatology. She was unable to keep her appointment with them, because the provider is out sick.     BACKGROUND:   Patient has GA (granuloma annulare). She states that only thing that helps her symptoms are steroids.   Advised her sending e-visit, but patient stated \" I can't do that.\"     REQUEST:   Wondering if Joana can prescribe steroids?  PHARMACY: [facility] Destini Guy RN    "

## 2022-08-03 ENCOUNTER — ANCILLARY PROCEDURE (OUTPATIENT)
Dept: MAMMOGRAPHY | Facility: CLINIC | Age: 57
End: 2022-08-03
Attending: NURSE PRACTITIONER
Payer: COMMERCIAL

## 2022-08-03 ENCOUNTER — OFFICE VISIT (OUTPATIENT)
Dept: FAMILY MEDICINE | Facility: CLINIC | Age: 57
End: 2022-08-03
Payer: COMMERCIAL

## 2022-08-03 VITALS
BODY MASS INDEX: 47.99 KG/M2 | OXYGEN SATURATION: 97 % | RESPIRATION RATE: 12 BRPM | SYSTOLIC BLOOD PRESSURE: 132 MMHG | TEMPERATURE: 97.1 F | HEART RATE: 72 BPM | DIASTOLIC BLOOD PRESSURE: 70 MMHG | WEIGHT: 254 LBS

## 2022-08-03 DIAGNOSIS — Z12.31 VISIT FOR SCREENING MAMMOGRAM: ICD-10-CM

## 2022-08-03 DIAGNOSIS — L92.0 GRANULOMA ANNULARE: Primary | ICD-10-CM

## 2022-08-03 PROCEDURE — 99213 OFFICE O/P EST LOW 20 MIN: CPT | Performed by: PHYSICIAN ASSISTANT

## 2022-08-03 PROCEDURE — 77067 SCR MAMMO BI INCL CAD: CPT | Mod: GC | Performed by: STUDENT IN AN ORGANIZED HEALTH CARE EDUCATION/TRAINING PROGRAM

## 2022-08-03 RX ORDER — CLOBETASOL PROPIONATE 0.5 MG/G
CREAM TOPICAL
COMMUNITY
Start: 2022-07-18 | End: 2024-01-09

## 2022-08-03 RX ORDER — HYDROXYCHLOROQUINE SULFATE 200 MG/1
TABLET, FILM COATED ORAL
COMMUNITY
Start: 2022-07-26 | End: 2024-01-09

## 2022-08-03 RX ORDER — PREDNISONE 20 MG/1
40 TABLET ORAL DAILY
Qty: 11 TABLET | Refills: 0 | Status: SHIPPED | OUTPATIENT
Start: 2022-08-03 | End: 2022-09-13

## 2022-08-03 ASSESSMENT — PATIENT HEALTH QUESTIONNAIRE - PHQ9
SUM OF ALL RESPONSES TO PHQ QUESTIONS 1-9: 1
10. IF YOU CHECKED OFF ANY PROBLEMS, HOW DIFFICULT HAVE THESE PROBLEMS MADE IT FOR YOU TO DO YOUR WORK, TAKE CARE OF THINGS AT HOME, OR GET ALONG WITH OTHER PEOPLE: NOT DIFFICULT AT ALL
SUM OF ALL RESPONSES TO PHQ QUESTIONS 1-9: 1

## 2022-08-03 ASSESSMENT — PAIN SCALES - GENERAL: PAINLEVEL: NO PAIN (0)

## 2022-08-03 NOTE — PATIENT INSTRUCTIONS
[facility] - dermatology - contact their nurse and let them know we did rx the steroid taper.  Advise getting rescheduled with derm.     Please get rescheduled with dermatology    You were prescribed a steroid. This is a strong anti-inflammatory.   Primary side effects can include insomnia (trouble with sleep), increased appetite, and irritability/moodiness.  By dosing this medication earlier in the day (taking in the morning and/or at lunch) can help reduce the sleep side effects.    Do not take NSAIDs while taking prednisone (examples of NSAIDs: ibuprofen, Advil, Aleve, naproxen, diclofenac, celebrex, etc).

## 2022-08-03 NOTE — PROGRESS NOTES
"  Assessment & Plan     Granuloma annulare  Pt is established w/dermatology. Her appt with them today was canceled due to provider being out sick.   Has been on oral hydroxychloroquine which has not been helpful and was due to discuss med changes  In flare up of sx which has previously been treated w/oral steroids from derm oral vs topical due to widespread distribution).   Advised she does need to reschedule w/derm for follow up and plan change. Encouraged to try to talk with a nurse at derm office.   I did prescribe a steroid taper but discussed risks vs benefits of recent multiple recent steroid treatments. I also advise she consult w/RN at derm about this and defer if covering derm provider would not agree with plan.   - predniSONE (DELTASONE) 20 MG tablet; Take 2 tablets (40 mg) by mouth daily x3 days, 1 tab x3 days, half-tab x3 d     BMI:   Estimated body mass index is 47.99 kg/m  as calculated from the following:    Height as of 1/10/22: 1.549 m (5' 1\").    Weight as of this encounter: 115.2 kg (254 lb).   Weight management plan: Patient was referred to their PCP to discuss a diet and exercise plan.    Follow Up: The patient was instructed to contact clinic for worsening symptoms, non-improvement in time frame as expected/discussed, and for questions regarding medications or treatment plan. For virtual visits, the patient was advised to be seen for in person evaluation if symptoms or condition are worsening or non-improvement as expected.       Return in about 2 weeks (around 8/17/2022).    ALFIE Llamas [facility] [place] is a 57 year old, presenting for the following health issues:  No chief complaint on file.      History of Present Illness       Reason for visit:  Rash granuloma annulare. Derm is out of clinic right now. Having a flare and Needs a steroid. Was suppose to see her today and she is out sick    She eats 2-3 servings of fruits and vegetables " "daily.She consumes 0 sweetened beverage(s) daily.She exercises with enough effort to increase her heart rate 9 or less minutes per day.  She exercises with enough effort to increase her heart rate 3 or less days per week.   She is taking medications regularly.    Today's PHQ-9         PHQ-9 Total Score: 1    PHQ-9 Q9 Thoughts of better off dead/self-harm past 2 weeks :   Not at all    How difficult have these problems made it for you to do your work, take care of things at home, or get along with other people: Not difficult at all     Granuloma annulare -   Diagnosed by dermatology 4months ago at [facility] dermatology in [place] (Silvia Gambino PA-C)   Dermatology started on hydroxychloroquine 200mg.   Was supposed to double that dose today at her follow up derm visit - but visit with derm was cancelled due to dermatology PA being out sick.   Rash affects arms and torso, back of legs, genital area- widespread outbreaks that spread.   Was prescribed oral steroids on a taper twice- due to wide spread distribution not able to use cream easily.   Last taper was 1 month ago.   Steroids totally clear up. But then comes back in a few weeks.   Rash itches.   Very bothersome in genital area.  She does not think the hydroxychloroquine has been helpful.   She states \"needs some relief\" and requesting oral steroids until can get back in w/derm  Is frustrated visit was canceled- hard to get time off work, requested to speak w/nurse at dermatology and wasn't able to.     Review of Systems   Constitutional, HEENT, cardiovascular, pulmonary, gi and gu systems are negative, except as otherwise noted.      Objective    /70   Pulse 72   Temp 97.1  F (36.2  C) (Temporal)   Resp 12   Wt 115.2 kg (254 lb)   SpO2 97%   BMI 47.99 kg/m    Body mass index is 47.99 kg/m .  Physical Exam   GENERAL: healthy, alert and no distress  SKIN: as pictured in photos below- erythematous annular lesions of variable sizes slightly raised edges. "                             .  ..

## 2022-08-10 DIAGNOSIS — F33.0 MAJOR DEPRESSIVE DISORDER, RECURRENT EPISODE, MILD (H): ICD-10-CM

## 2022-08-11 RX ORDER — CITALOPRAM HYDROBROMIDE 20 MG/1
TABLET ORAL
Qty: 135 TABLET | Refills: 0 | Status: SHIPPED | OUTPATIENT
Start: 2022-08-11 | End: 2022-08-25

## 2022-08-25 ENCOUNTER — MYC MEDICAL ADVICE (OUTPATIENT)
Dept: FAMILY MEDICINE | Facility: CLINIC | Age: 57
End: 2022-08-25

## 2022-08-25 ENCOUNTER — VIRTUAL VISIT (OUTPATIENT)
Dept: FAMILY MEDICINE | Facility: CLINIC | Age: 57
End: 2022-08-25
Payer: COMMERCIAL

## 2022-08-25 DIAGNOSIS — F33.0 MAJOR DEPRESSIVE DISORDER, RECURRENT EPISODE, MILD (H): ICD-10-CM

## 2022-08-25 DIAGNOSIS — M25.511 ACUTE PAIN OF RIGHT SHOULDER DUE TO TRAUMA: Primary | ICD-10-CM

## 2022-08-25 DIAGNOSIS — G89.11 ACUTE PAIN OF RIGHT SHOULDER DUE TO TRAUMA: Primary | ICD-10-CM

## 2022-08-25 PROCEDURE — 99214 OFFICE O/P EST MOD 30 MIN: CPT | Mod: 95 | Performed by: NURSE PRACTITIONER

## 2022-08-25 RX ORDER — CITALOPRAM HYDROBROMIDE 20 MG/1
TABLET ORAL
Qty: 135 TABLET | Refills: 1 | Status: SHIPPED | OUTPATIENT
Start: 2022-08-25 | End: 2022-11-14

## 2022-08-25 ASSESSMENT — ANXIETY QUESTIONNAIRES
4. TROUBLE RELAXING: NOT AT ALL
GAD7 TOTAL SCORE: 0
2. NOT BEING ABLE TO STOP OR CONTROL WORRYING: NOT AT ALL
IF YOU CHECKED OFF ANY PROBLEMS ON THIS QUESTIONNAIRE, HOW DIFFICULT HAVE THESE PROBLEMS MADE IT FOR YOU TO DO YOUR WORK, TAKE CARE OF THINGS AT HOME, OR GET ALONG WITH OTHER PEOPLE: NOT DIFFICULT AT ALL
5. BEING SO RESTLESS THAT IT IS HARD TO SIT STILL: NOT AT ALL
6. BECOMING EASILY ANNOYED OR IRRITABLE: NOT AT ALL
1. FEELING NERVOUS, ANXIOUS, OR ON EDGE: NOT AT ALL
GAD7 TOTAL SCORE: 0
3. WORRYING TOO MUCH ABOUT DIFFERENT THINGS: NOT AT ALL
7. FEELING AFRAID AS IF SOMETHING AWFUL MIGHT HAPPEN: NOT AT ALL
7. FEELING AFRAID AS IF SOMETHING AWFUL MIGHT HAPPEN: NOT AT ALL
GAD7 TOTAL SCORE: 0
8. IF YOU CHECKED OFF ANY PROBLEMS, HOW DIFFICULT HAVE THESE MADE IT FOR YOU TO DO YOUR WORK, TAKE CARE OF THINGS AT HOME, OR GET ALONG WITH OTHER PEOPLE?: NOT DIFFICULT AT ALL

## 2022-08-25 ASSESSMENT — PATIENT HEALTH QUESTIONNAIRE - PHQ9
SUM OF ALL RESPONSES TO PHQ QUESTIONS 1-9: 1
SUM OF ALL RESPONSES TO PHQ QUESTIONS 1-9: 1
10. IF YOU CHECKED OFF ANY PROBLEMS, HOW DIFFICULT HAVE THESE PROBLEMS MADE IT FOR YOU TO DO YOUR WORK, TAKE CARE OF THINGS AT HOME, OR GET ALONG WITH OTHER PEOPLE: SOMEWHAT DIFFICULT

## 2022-08-25 NOTE — PROGRESS NOTES
Genesis is a 57 year old who is being evaluated via a billable video visit.      How would you like to obtain your AVS? Mail  If the video visit is dropped, the invitation should be resent by: Text to cell phone: [phone]  Will anyone else be joining your video visit? No        Assessment & Plan     (M25.511,  G89.11) Acute pain of right shoulder due to trauma  (primary encounter diagnosis)  Comment:   Plan: Orthopedic  Referral, Physical Therapy        Referral        Slow return to baseline. Advise PT and ortho eval.   Work letter given. Demonstrated HEP for Range of Motion, not lifting, to regain range and aid with healing.     (F33.0) Major depressive disorder, recurrent episode, mild (H)  Comment:   Plan: citalopram (CELEXA) 20 MG tablet        Stable, meds refilled.     PE due prior to year end           No follow-ups on file.    AMADOU Koenig [facility] [place] is a 57 year old, presenting for the following health issues:  Arm Injury (Pt needs guidance on how to proceed with working status post arm injury)      Arm Injury    History of Present Illness       Reason for visit:  RT arm injury  Symptom onset:  1-2 weeks ago  Symptoms include:  Upper arm pain post fall  Symptom intensity:  Mild  Symptom progression:  Improving  Had these symptoms before:  No  What makes it worse:  Movement, bumping it  What makes it better:  Resting, keeping arm straight    She eats 4 or more servings of fruits and vegetables daily.She consumes 3 sweetened beverage(s) daily.She exercises with enough effort to increase her heart rate 9 or less minutes per day.  She exercises with enough effort to increase her heart rate 3 or less days per week.   She is taking medications regularly.    Today's PHQ-9         PHQ-9 Total Score: 1    PHQ-9 Q9 Thoughts of better off dead/self-harm past 2 weeks :   Not at all    How difficult have these problems made it for you to do your  work, take care of things at home, or get along with other people: Somewhat difficult  Today's CHELSY-7 Score: 0     8/13- fell at home over her flip-flop shoes down 2 steps and fell on her right arm     difficulty with overhead ROM.     [facility] records reviewed.           Review of Systems   Constitutional, HEENT, cardiovascular, pulmonary, gi and gu systems are negative, except as otherwise noted.      Objective           Vitals:  No vitals were obtained today due to virtual visit.    Physical Exam   GENERAL: Healthy, alert and no distress  EYES: Eyes grossly normal to inspection.  No discharge or erythema, or obvious scleral/conjunctival abnormalities.  RESP: No audible wheeze, cough, or visible cyanosis.  No visible retractions or increased work of breathing.    MS: very limited rotation and overhead motion of right shoulder, difficult posterior reaching.   SKIN: c/d/I, large ecchymotic region on right humerous No significant rash, abnormal pigmentation or lesions.  NEURO: Cranial nerves grossly intact.  Mentation and speech appropriate for age.  PSYCH: Mentation appears normal, affect normal/bright, judgement and insight intact, normal speech and appearance well-groomed.                Video-Visit Details    Video Start Time: 12:53 pm    Type of service:  Video Visit    Video End Time:1:03 PM    Originating Location (pt. Location): Home    Distant Location (provider location):  [facility]     Platform used for Video Visit: RondaBeyond the Box    .  [place]

## 2022-08-25 NOTE — TELEPHONE ENCOUNTER
Per virtual visit check out    Visit Disposition    Check-out Note   Schedule PE   Mail letter to pt.          Patricia [place] CMA ([facility])

## 2022-08-25 NOTE — TELEPHONE ENCOUNTER
Spoke with patient appointment scheduled   Next 5 appointments (look out 90 days)    Nov 14, 2022 11:30 AM  (Arrive by 11:10 AM)  Adult Preventative Visit with AMADOU Smith CNP  [facility] [place] ([facility] [place] ) [address], [address]  [place] [address] [phone]        Letter sent   Closing encounter  Yeni Gregg RT (R)

## 2022-08-25 NOTE — LETTER
August 25, 2022      Genesis Adhikari  [address] [place] [address]        To Whom It May Concern:    Genesis Adhikari  was evaluated today.  Please excuse her  until until cleared by orthopedics for non-work related injury. Anticipated follow-up next week. Return to work not anticipated until sometime after that visit.         Sincerely,    Electronically signed.     AMADOU Koenig CNP

## 2022-08-29 ENCOUNTER — THERAPY VISIT (OUTPATIENT)
Dept: PHYSICAL THERAPY | Facility: CLINIC | Age: 57
End: 2022-08-29
Payer: COMMERCIAL

## 2022-08-29 DIAGNOSIS — M25.511 ACUTE PAIN OF RIGHT SHOULDER: ICD-10-CM

## 2022-08-29 PROCEDURE — 97110 THERAPEUTIC EXERCISES: CPT | Mod: GP | Performed by: PHYSICAL THERAPIST

## 2022-08-29 PROCEDURE — 97161 PT EVAL LOW COMPLEX 20 MIN: CPT | Mod: GP | Performed by: PHYSICAL THERAPIST

## 2022-08-29 NOTE — PROGRESS NOTES
Physical Therapy Initial Evaluation  Subjective:  The history is provided by the patient and medical records. No  was used.   Patient Health History  Genesis Adhikari being seen for R Shoulder Pain.     Problem began: 8/13/2022.   Problem occurred:  fell at home over her flip-flop shoes down 2 steps and fell on her right arm    Pain is reported as 1/10 (1/10 at rest; 7/10 with certain activities) on pain scale.  General health as reported by patient is good.  Pertinent medical history includes: depression.   Red flags:  None as reported by patient.  Medical allergies: none.   Surgeries include:  None.    Current medications:  Anti-depressants.    Current occupation is ; Lives at home w/ spouse; Hobbies include gardening.   Primary job tasks include:  Lifting/carrying, driving, prolonged standing and pushing/pulling.                  Therapist Generated HPI Evaluation         Type of problem:  Right shoulder.    This is a new condition.  Condition occurred with:  A fall.  Where condition occurred: at home.  Patient reports pain:  Anterior and lateral.  Pain is described as aching, sharp and stabbing and is intermittent.  Radiates to: no radiation. Pain is the same all the time.  Since onset symptoms are gradually improving.  Associated symptoms:  Loss of motion/stiffness, loss of strength and edema (bruising). Symptoms are exacerbated by certain positions, lifting, lying on extremity, using arm at shoulder level, using arm behind back and using arm overhead (avoiding lifting)  and relieved by rest.  Special tests included:  X-ray.  Past treatment: none yet.   Restrictions due to condition include:  Currently not working due to present treatment (No lifting per PCP).  Barriers include:  None as reported by patient.                        Objective:  Standing Alignment:    Cervical/Thoracic:  Forward head  Shoulder/UE:  Rounded shoulders and elevated scapula R                                        Shoulder Evaluation:  ROM:  AROM:    Flexion:  Left:  170    Right:  80 +pain  Extension: Left: 70Right: 62  Abduction:  Left: 160   Right:  102 +pain      External Rotation:  Left:  75    Right:  40 +pain            Extension/Internal Rotation:  Left:  T5    Right:  T9    PROM:    Flexion:  Right: 148 +pain      Abduction:  Right:  142 +pain                          Strength:  : L WNL (Flex, Abd, ER, IR); R Flex, Abd, ER all able to apply min resistance in mid range of available motion ++pain, IR 5/5.                        Special Tests:  Special tests assessed shoulder: (+) Speed's test R.      Right shoulder negative for the following special tests:Rotator cuff tear (-) Empty can (submaximal testing but able to hold resistance), (-) Spring back, (-) Lift off test  Palpation:  Palpation assessed shoulder: R pec minor     Right shoulder tenderness present at: Upper Trap and Bicipital Groove                                     General     ROS    Assessment/Plan:    Patient is a 57 year old female with right side shoulder complaints.    Patient has the following significant findings with corresponding treatment plan.                Diagnosis 1:  R Shoulder pain s/p fall  Pain -  hot/cold therapy, electric stimulation, manual therapy, splint/taping/bracing/orthotics, self management, education, directional preference exercise and home program  Decreased ROM/flexibility - manual therapy, therapeutic exercise, therapeutic activity and home program  Decreased strength - therapeutic exercise, therapeutic activities and home program  Impaired muscle performance - neuro re-education and home program  Decreased function - therapeutic activities and home program  Impaired posture - neuro re-education, therapeutic activities and home program    Therapy Evaluation Codes:   1) History comprised of:   Personal factors that impact the plan of care:      Age, Past/current experiences and Profession.    Comorbidity  factors that impact the plan of care are:      Depression.     Medications impacting care: Anti-depressant.  2) Examination of Body Systems comprised of:   Body structures and functions that impact the plan of care:      Shoulder.   Activity limitations that impact the plan of care are:      Bathing, Cooking, Driving, Dressing, Lifting, Working, Sleeping and Laying down.  3) Clinical presentation characteristics are:   Stable/Uncomplicated.  4) Decision-Making    Low complexity using standardized patient assessment instrument and/or measureable assessment of functional outcome.  Cumulative Therapy Evaluation is: Low complexity.    Previous and current functional limitations:  (See Goal Flow Sheet for this information)    Short term and Long term goals: (See Goal Flow Sheet for this information)     Communication ability:  Patient appears to be able to clearly communicate and understand verbal and written communication and follow directions correctly.  Treatment Explanation - The following has been discussed with the patient:   RX ordered/plan of care  Anticipated outcomes  Possible risks and side effects  This patient would benefit from PT intervention to resume normal activities.   Rehab potential is good.    Frequency:  2 X week, once daily  Duration:  for 2 weeks tapering to 1 X a week over 4 weeks  Discharge Plan:  Achieve all LTG.  Independent in home treatment program.  Reach maximal therapeutic benefit.    Please refer to the daily flowsheet for treatment today, total treatment time and time spent performing 1:1 timed codes.

## 2022-09-02 ENCOUNTER — THERAPY VISIT (OUTPATIENT)
Dept: PHYSICAL THERAPY | Facility: CLINIC | Age: 57
End: 2022-09-02
Payer: COMMERCIAL

## 2022-09-02 DIAGNOSIS — M25.511 ACUTE PAIN OF RIGHT SHOULDER: Primary | ICD-10-CM

## 2022-09-02 PROCEDURE — 97140 MANUAL THERAPY 1/> REGIONS: CPT | Mod: GP | Performed by: PHYSICAL THERAPIST

## 2022-09-02 PROCEDURE — 97112 NEUROMUSCULAR REEDUCATION: CPT | Mod: GP | Performed by: PHYSICAL THERAPIST

## 2022-09-02 PROCEDURE — 97110 THERAPEUTIC EXERCISES: CPT | Mod: GP | Performed by: PHYSICAL THERAPIST

## 2022-09-13 ENCOUNTER — OFFICE VISIT (OUTPATIENT)
Dept: FAMILY MEDICINE | Facility: CLINIC | Age: 57
End: 2022-09-13
Payer: COMMERCIAL

## 2022-09-13 ENCOUNTER — TELEPHONE (OUTPATIENT)
Dept: FAMILY MEDICINE | Facility: CLINIC | Age: 57
End: 2022-09-13

## 2022-09-13 VITALS
SYSTOLIC BLOOD PRESSURE: 132 MMHG | HEIGHT: 61 IN | BODY MASS INDEX: 48.48 KG/M2 | HEART RATE: 78 BPM | OXYGEN SATURATION: 97 % | DIASTOLIC BLOOD PRESSURE: 78 MMHG | WEIGHT: 256.8 LBS | RESPIRATION RATE: 20 BRPM | TEMPERATURE: 97.3 F

## 2022-09-13 DIAGNOSIS — S46.001D INJURY OF RIGHT ROTATOR CUFF, SUBSEQUENT ENCOUNTER: Primary | ICD-10-CM

## 2022-09-13 PROCEDURE — 99213 OFFICE O/P EST LOW 20 MIN: CPT | Performed by: NURSE PRACTITIONER

## 2022-09-13 ASSESSMENT — PAIN SCALES - GENERAL: PAINLEVEL: NO PAIN (0)

## 2022-09-13 NOTE — TELEPHONE ENCOUNTER
Patient was directed to sports ortho for follow-up. If she is progressing, and does not want to see PT, still needs OV for this. Please aid with scheduling with me or open provider. I'm open at 2 pm today.  AMADOU Koenig CNP

## 2022-09-13 NOTE — TELEPHONE ENCOUNTER
Forms/Letter Request    Type of form/letter: Letter updating her employer on healing progress  Pt is inPT and PT told her she would likely be able to return to work in 2-3 weeks.     Employer does not want her back until she is 100% but in the meantime needs to know what she is doing to aid in recovery and when a possible return to work date would be    Have you been seen for this request: Yes     Do we have the form/letter: No    When is form/letter needed by: end of the week    How would you like the form/letter returned: Fax Nai [phone]    Patient Notified form requests are processed in 3-5 business days:Yes    Could we send this information to you in [employer] or would you prefer to receive a phone call?:   No preference   Okay to leave a detailed message?: Yes at Cell number on file:    Telephone Information:   Mobile [phone]

## 2022-09-13 NOTE — PROGRESS NOTES
"  Assessment & Plan   Problem List Items Addressed This Visit    None     Visit Diagnoses     Injury of right rotator cuff, subsequent encounter    -  Primary           Continue PT, relative rest with strength building. OK for return in 2 weeks. If not progressing to sports ortho as prev. Planned.   Pt. Will continue PT in this timeframe. Work note given.   Return to clinic with any new or worsening symptoms, and as needed.              Return in about 2 months (around 11/13/2022) for Physical Exam.    AMADOU Koenig CNP  [facility] BRENDAN Hurtado is a 57 year old, presenting for the following health issues:  Shoulder (Right shoulder )      History of Present Illness       Reason for visit:  Return to work note   Symptom onset:  More than a month  Symptoms include:  Right shoulder pain   Symptom progression:  Improving    She eats 4 or more servings of fruits and vegetables daily.She consumes 3 sweetened beverage(s) daily.She exercises with enough effort to increase her heart rate 20 to 29 minutes per day.  She exercises with enough effort to increase her heart rate 4 days per week.   She is taking medications regularly.     Fell, and had pain and decreased ROM. Patient states pain is almost gone and rates 1-2/10 at times. WOrst at night. Has been progressing well.   Has had 2 PT sessions.   Pt. Thinks she is getting better and would like to get back to work in about 2 weeks, and feels she is progressing enough to do this.     Mood-stable.   Due for PE- has scheduled.           Review of Systems   Constitutional, HEENT, cardiovascular, pulmonary, gi and gu systems are negative, except as otherwise noted.      Objective    /78   Pulse 78   Temp 97.3  F (36.3  C) (Temporal)   Resp 20   Ht 1.549 m (5' 0.98\")   Wt 116.5 kg (256 lb 12.8 oz)   SpO2 97%   BMI 48.55 kg/m    Body mass index is 48.55 kg/m .  Physical Exam   GENERAL: healthy, alert and no distress  MS: " extremities normal- no gross deformities noted, Right UE difficult ROM overhead but about 90% completed, push pull strength of right shoulder is good. Pain in ant. Shoulder tendons.   SKIN: granuloma annulare  is calm,  no other suspicious lesions or rashes  NEURO: Normal strength and tone, mentation intact and speech normal

## 2022-09-13 NOTE — TELEPHONE ENCOUNTER
Appointment made for this afternoon.    Sahil Gentile, UGON, RN, PHN  [facility] ~ Registered Nurse  Clinic Triage ~ [place] [facility] [place] [facility]  September 13, 2022

## 2022-09-13 NOTE — LETTER
September 13, 2022      Genesis Adhikari  [address] [place] [address]        To Whom It May Concern,      Genesis was seen today for a non-work related right shoulder injury. She may return to full duty without restriction on 9/27/22. She is continuing physical therapy still within this timeframe.           Sincerely,        AMADOU Koenig CNP

## 2022-09-15 ENCOUNTER — THERAPY VISIT (OUTPATIENT)
Dept: PHYSICAL THERAPY | Facility: CLINIC | Age: 57
End: 2022-09-15
Payer: COMMERCIAL

## 2022-09-15 DIAGNOSIS — M25.511 ACUTE PAIN OF RIGHT SHOULDER: Primary | ICD-10-CM

## 2022-09-15 PROCEDURE — 97112 NEUROMUSCULAR REEDUCATION: CPT | Mod: GP | Performed by: PHYSICAL THERAPIST

## 2022-09-15 PROCEDURE — 97140 MANUAL THERAPY 1/> REGIONS: CPT | Mod: GP | Performed by: PHYSICAL THERAPIST

## 2022-09-15 PROCEDURE — 97110 THERAPEUTIC EXERCISES: CPT | Mod: GP | Performed by: PHYSICAL THERAPIST

## 2022-09-19 ENCOUNTER — THERAPY VISIT (OUTPATIENT)
Dept: PHYSICAL THERAPY | Facility: CLINIC | Age: 57
End: 2022-09-19
Payer: COMMERCIAL

## 2022-09-19 DIAGNOSIS — Z53.9 ERRONEOUS ENCOUNTER--DISREGARD: ICD-10-CM

## 2022-09-19 DIAGNOSIS — M25.511 ACUTE PAIN OF RIGHT SHOULDER: Primary | ICD-10-CM

## 2022-09-19 PROCEDURE — 99207 PR NO CHARGE LOS: CPT | Mod: GP | Performed by: PHYSICAL THERAPIST

## 2022-09-22 ENCOUNTER — THERAPY VISIT (OUTPATIENT)
Dept: PHYSICAL THERAPY | Facility: CLINIC | Age: 57
End: 2022-09-22
Payer: COMMERCIAL

## 2022-09-22 DIAGNOSIS — M25.511 ACUTE PAIN OF RIGHT SHOULDER: Primary | ICD-10-CM

## 2022-09-22 PROCEDURE — 97140 MANUAL THERAPY 1/> REGIONS: CPT | Mod: GP | Performed by: PHYSICAL THERAPIST

## 2022-09-22 PROCEDURE — 97112 NEUROMUSCULAR REEDUCATION: CPT | Mod: GP | Performed by: PHYSICAL THERAPIST

## 2022-09-22 PROCEDURE — 97110 THERAPEUTIC EXERCISES: CPT | Mod: GP | Performed by: PHYSICAL THERAPIST

## 2022-09-26 NOTE — PROGRESS NOTES
"CHIEF COMPLAINT:   Chief Complaint   Patient presents with     Right Shoulder - Pain     Right upper extremity injury. DOI: 8/13/22.      Fall     Right shoulder pain. Onset: 8/13/22. Fell on elbow on concrete. [place] [facility] [place]. She is a right hand dominant  (in a truck). She has been off work since injury. She is ready to go back. Hoping for a cortisone injection and return to work tomorrow. 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 Cara angelo. provider found for right shoulder injury that occurred 6+ weeks ago. She fell onto her right arm 8/13/2022, onset of arm/shoulder pain. [facility] to [facility] on 8/15/2022 because of difficulties lifting the arm. Had xrays of the elbow which were normal. But pain more shoulder and upper arm.    Went back to work yesterday (), and when went to reach back to shut the door had more arm/shoulder pain. Difficulties with reaching overhead.    Since onset, overall \"way better\".    .    Onset: Following acute injury.  Mechanism of injury:  blow/fall.  Symptoms have been improving since that time.  Aggrevated by: reaching, lifting, , Physical Therapy   Relieved by: ice, ibuprofen.  Present symptoms: pain with ADL's (dressing),  pain with overhead activities,  pain reaching behind back,  pain reaching out or away from body (flexion/ abduction),  positional night pain,  pain lifting,  Pain location: lateral shoulder and deltoid and upper arm  Pain severity: 1/10  Pain quality: dull  Frequency of symptoms: are constant  Associated symptoms: denies    Treatment up to this point:ice, NSAIDS and PT    Prior history of related problems: no prior " problems with this area in the past      Other PMH:  has a past medical history of NO ACTIVE PROBLEMS.  Patient Active Problem List   Diagnosis     Vaginitis and vulvovaginitis     Depressive disorder, not elsewhere classified     Other, mixed, or unspecified nondependent drug abuse, in remission     CARDIOVASCULAR SCREENING; LDL GOAL LESS THAN 160     Major depressive disorder, recurrent episode, mild (H)     Obesity     H/O laparoscopic adjustable gastric banding     Seborrheic dermatitis of scalp     Alcohol use     Uterine enlargement     Cyst of uterus     Thickened endometrium     S/P endometrial ablation     Polyp of corpus uteri     Morbid obesity (H)     Granuloma annulare     Acute pain of right shoulder       Surgical Hx:  has a past surgical history that includes LIGATE FALLOPIAN TUBE,POSTPARTUM (08/07/2002); lap band (3/23/13); hysteroscopy (3/21/11); ABLATION, ENDOMETRIAL, THERMAL, W/O HYSTEROSCOPIC GUIDANCE (3/21/11); TOOTH EXTRACTION W/FORCEP; Dilation and curettage, hysteroscopy diagnostic, combined (N/A, 03/16/2017); Dilation and curettage, hysteroscopy diagnostic, combined (N/A, 3/16/2017); and Colonoscopy with CO2 insufflation (N/A, 6/11/2018).    Medications:   Current Outpatient Medications:      citalopram (CELEXA) 20 MG tablet, TAKE 1 AND 1/2 TABLETS (30 MG) BY MOUTH DAILY, Disp: 135 tablet, Rfl: 1     clobetasol (TEMOVATE) 0.05 % external cream, APPLY TWICE DAILY TO AFFECTED AREAS FOR UP TO 21 DAYS DO NOT APPLY TO NORMAL SKIN (Patient not taking: Reported on 9/13/2022), Disp: , Rfl:      hydroxychloroquine (PLAQUENIL) 200 MG tablet, , Disp: , Rfl:      triamcinolone (KENALOG) 0.1 % external cream, For external use twice daily for 2 weeks, then twice daily weekends only. (Patient not taking: Reported on 9/13/2022), Disp: 45 g, Rfl: 1    Allergies:   Allergies   Allergen Reactions     No Known Drug Allergies        Social Hx: .   reports that she quit smoking about 4 years ago. Her  "smoking use included cigarettes. She has a 12.50 pack-year smoking history. She has never used smokeless tobacco. She reports current alcohol use of about 10.0 standard drinks of alcohol per week. She reports that she does not use drugs.    Family Hx: family history includes Cancer in her father and maternal grandmother; Heart Disease in her maternal grandfather..    REVIEW OF SYSTEMS: 10 point ROS neg other than the symptoms noted above in the HPI and PMH. Notables include  CONSTITUTIONAL:NEGATIVE for fever, chills, change in weight  INTEGUMENTARY/SKIN: NEGATIVE for worrisome rashes, moles or lesions  MUSCULOSKELETAL:See HPI above  NEURO: NEGATIVE for weakness, dizziness or paresthesias    PHYSICAL EXAM:  Ht 1.549 m (5' 1\")   Wt 116.1 kg (256 lb)   BMI 48.37 kg/m     GENERAL APPEARANCE: healthy, alert, no distress  SKIN: no suspicious lesions or rashes  NEURO: Normal strength and tone, mentation intact and speech normal  PSYCH:  mentation appears normal and affect normal, not anxious  RESPIRATORY: No increased work of breathing.  VASCULAR: Radial pulses 2+ and brisk cappillary refill       MUSCULOSKELETAL:    RIGHT UPPER EXTREMITY:  Sensation intact to light touch in median, radial, ulnar and axillary nerve distributions  Palpable 2+ radial pulse, brisk capillary refill to all fingers, wwp  Intact epl fpl fdp edc wrist flexion/extension biceps triceps deltoid    RIGHT SHOULDER:  Shoulder Inspection: no swelling, bruising, discoloration, or obvious deformity or asymmetry  Tender: acromion, anterior capsule, proximal bicep tendon, greater tuberosity, proximal humerus and biceps muscle  Non-tender: SC joint  Range of Motion:   Active:forward flexion 140 degrees, external rotation  45 degrees, internal rotation  hip pocket  Strength: forward flexion 4+/5, painful, limited by pain, External rotation 4+/5, painful, limited by pain   Impingement: all grade 2 positive  Special tests: Belly Press: Negative  Empty Can: " equivocal.    LEFT UPPER EXTREMITY:  Sensation intact to light touch in median, radial, ulnar and axillary nerve distributions  Palpable 2+ radial pulse, brisk capillary refill to all fingers, wwp  Intact epl fpl fdp edc wrist flexion/extension biceps triceps deltoid    LEFT SHOULDER:  Shoulder Inspection: no swelling, bruising, discoloration, or obvious deformity or asymmetry  Tender: none  Range of Motion:   Active:forward flexion 165 degrees, external rotation  60 degrees, internal rotation  Bra line.  Strength: forward flexion 5/5, External rotation 5/5    Impingement: negative.  Special tests: Empty Can: Negative      X-RAY INTERPRETATION: 3 views right  shoulder obtained 9/26/2022 were reviewed personally in clinic today with the patient. On my review, there is a healing, mildly displaced fracture of the greater tuberosity. There is surround callus formation.    ASSESSMENT: Genesis Adhikari is a 57 year old female, right  -hand dominant with acute right shoulder pain, mildly displaced right greater tuberosity fracture with incomplete healing      PLAN:   * exam, images reviewed  * continued pain, weakness, stiffness a result of the injury to the shoulder, a fracture  * the fracture is healing, but healing is not complete  * likely another month or so  * don't recommend heavy lifting, reaching, etc.  * recommend continued Physical Therapy working on range of motion at this time  * light use of the arm, ADLs essentially is ok, but nothing heavy or strenuous  * I would not recommend return to work without restrictions at this time, but if she could return with restrictions that would be ok. However, she states she needs to be back 100%.  * note for work today. No return to work at this time. (in MyChart)  * reassess 4 weeks, sooner if needed  * xrays right shoulder, internal rotation / external rotation / axillary views only.    * All questions were addressed and answered prior to discharge from clinic today. The  patient acknowledges an understanding of and agreement with the plan set forth during today's visit. Patient was advised to call our office or MyChart us if any further questions arise upon leaving our office today.      Kali Leblanc M.D., M.S.  Dept. of Orthopaedic Surgery  [facility]

## 2022-09-28 ENCOUNTER — OFFICE VISIT (OUTPATIENT)
Dept: ORTHOPEDICS | Facility: CLINIC | Age: 57
End: 2022-09-28
Payer: COMMERCIAL

## 2022-09-28 ENCOUNTER — ANCILLARY PROCEDURE (OUTPATIENT)
Dept: GENERAL RADIOLOGY | Facility: CLINIC | Age: 57
End: 2022-09-28
Attending: PHYSICIAN ASSISTANT
Payer: COMMERCIAL

## 2022-09-28 VITALS — BODY MASS INDEX: 48.33 KG/M2 | WEIGHT: 256 LBS | HEIGHT: 61 IN

## 2022-09-28 DIAGNOSIS — S49.91XA INJURY OF RIGHT SHOULDER, INITIAL ENCOUNTER: ICD-10-CM

## 2022-09-28 DIAGNOSIS — S42.251A: Primary | ICD-10-CM

## 2022-09-28 PROCEDURE — 73030 X-RAY EXAM OF SHOULDER: CPT | Mod: TC | Performed by: RADIOLOGY

## 2022-09-28 PROCEDURE — 99203 OFFICE O/P NEW LOW 30 MIN: CPT | Performed by: ORTHOPAEDIC SURGERY

## 2022-09-28 ASSESSMENT — PAIN SCALES - GENERAL: PAINLEVEL: NO PAIN (1)

## 2022-09-28 NOTE — LETTER
"    9/28/2022         RE: Genesis Adhikari  [address] [place] [address]        Dear Colleague,    Thank you for referring your patient, Genesis Adhikari, to the [facility] [place]. Please see a copy of my visit note below.    CHIEF COMPLAINT:   Chief Complaint   Patient presents with     Right Shoulder - Pain     Right upper extremity injury. DOI: 8/13/22.      Fall     Right shoulder pain. Onset: 8/13/22. Fell on elbow on concrete. Went to [facility]. Got elbow x-rays. She is a right hand dominant  (in a truck). She has been off work since injury. She is ready to go back. Hoping for a cortisone injection and return to work tomorrow. 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 Cara angelo. provider found for right shoulder injury that occurred 6+ weeks ago. She fell onto her right arm 8/13/2022, onset of arm/shoulder pain. Went to [facility] on 8/15/2022 because of difficulties lifting the arm. Had xrays of the elbow which were normal. But pain more shoulder and upper arm.    Went back to work yesterday (), and when went to reach back to shut the door had more arm/shoulder pain. Difficulties with reaching overhead.    Since onset, overall \"way better\".    .    Onset: Following acute injury.  Mechanism of injury:  blow/fall.  Symptoms have been improving since that time.  Aggrevated by: reaching, lifting, , Physical Therapy   Relieved by: ice, ibuprofen.  Present symptoms: pain with ADL's (dressing),  pain with overhead activities,  pain reaching behind back,  pain reaching out or away from body (flexion/ abduction),  positional night pain,  pain " lifting,  Pain location: lateral shoulder and deltoid and upper arm  Pain severity: 1/10  Pain quality: dull  Frequency of symptoms: are constant  Associated symptoms: denies    Treatment up to this point:ice, NSAIDS and PT    Prior history of related problems: no prior problems with this area in the past      Other PMH:  has a past medical history of NO ACTIVE PROBLEMS.  Patient Active Problem List   Diagnosis     Vaginitis and vulvovaginitis     Depressive disorder, not elsewhere classified     Other, mixed, or unspecified nondependent drug abuse, in remission     CARDIOVASCULAR SCREENING; LDL GOAL LESS THAN 160     Major depressive disorder, recurrent episode, mild (H)     Obesity     H/O laparoscopic adjustable gastric banding     Seborrheic dermatitis of scalp     Alcohol use     Uterine enlargement     Cyst of uterus     Thickened endometrium     S/P endometrial ablation     Polyp of corpus uteri     Morbid obesity (H)     Granuloma annulare     Acute pain of right shoulder       Surgical Hx:  has a past surgical history that includes LIGATE FALLOPIAN TUBE,POSTPARTUM (08/07/2002); lap band (3/23/13); hysteroscopy (3/21/11); ABLATION, ENDOMETRIAL, THERMAL, W/O HYSTEROSCOPIC GUIDANCE (3/21/11); TOOTH EXTRACTION W/FORCEP; Dilation and curettage, hysteroscopy diagnostic, combined (N/A, 03/16/2017); Dilation and curettage, hysteroscopy diagnostic, combined (N/A, 3/16/2017); and Colonoscopy with CO2 insufflation (N/A, 6/11/2018).    Medications:   Current Outpatient Medications:      citalopram (CELEXA) 20 MG tablet, TAKE 1 AND 1/2 TABLETS (30 MG) BY MOUTH DAILY, Disp: 135 tablet, Rfl: 1     clobetasol (TEMOVATE) 0.05 % external cream, APPLY TWICE DAILY TO AFFECTED AREAS FOR UP TO 21 DAYS DO NOT APPLY TO NORMAL SKIN (Patient not taking: Reported on 9/13/2022), Disp: , Rfl:      hydroxychloroquine (PLAQUENIL) 200 MG tablet, , Disp: , Rfl:      triamcinolone (KENALOG) 0.1 % external cream, For external use twice daily  "for 2 weeks, then twice daily weekends only. (Patient not taking: Reported on 9/13/2022), Disp: 45 g, Rfl: 1    Allergies:   Allergies   Allergen Reactions     No Known Drug Allergies        Social Hx: .   reports that she quit smoking about 4 years ago. Her smoking use included cigarettes. She has a 12.50 pack-year smoking history. She has never used smokeless tobacco. She reports current alcohol use of about 10.0 standard drinks of alcohol per week. She reports that she does not use drugs.    Family Hx: family history includes Cancer in her father and maternal grandmother; Heart Disease in her maternal grandfather..    REVIEW OF SYSTEMS: 10 point ROS neg other than the symptoms noted above in the HPI and PMH. Notables include  CONSTITUTIONAL:NEGATIVE for fever, chills, change in weight  INTEGUMENTARY/SKIN: NEGATIVE for worrisome rashes, moles or lesions  MUSCULOSKELETAL:See HPI above  NEURO: NEGATIVE for weakness, dizziness or paresthesias    PHYSICAL EXAM:  Ht 1.549 m (5' 1\")   Wt 116.1 kg (256 lb)   BMI 48.37 kg/m     GENERAL APPEARANCE: healthy, alert, no distress  SKIN: no suspicious lesions or rashes  NEURO: Normal strength and tone, mentation intact and speech normal  PSYCH:  mentation appears normal and affect normal, not anxious  RESPIRATORY: No increased work of breathing.  VASCULAR: Radial pulses 2+ and brisk cappillary refill       MUSCULOSKELETAL:    RIGHT UPPER EXTREMITY:  Sensation intact to light touch in median, radial, ulnar and axillary nerve distributions  Palpable 2+ radial pulse, brisk capillary refill to all fingers, wwp  Intact epl fpl fdp edc wrist flexion/extension biceps triceps deltoid    RIGHT SHOULDER:  Shoulder Inspection: no swelling, bruising, discoloration, or obvious deformity or asymmetry  Tender: acromion, anterior capsule, proximal bicep tendon, greater tuberosity, proximal humerus and biceps muscle  Non-tender: SC joint  Range of Motion:   Active:forward flexion " 140 degrees, external rotation  45 degrees, internal rotation  hip pocket  Strength: forward flexion 4+/5, painful, limited by pain, External rotation 4+/5, painful, limited by pain   Impingement: all grade 2 positive  Special tests: Belly Press: Negative  Empty Can: equivocal.    LEFT UPPER EXTREMITY:  Sensation intact to light touch in median, radial, ulnar and axillary nerve distributions  Palpable 2+ radial pulse, brisk capillary refill to all fingers, wwp  Intact epl fpl fdp edc wrist flexion/extension biceps triceps deltoid    LEFT SHOULDER:  Shoulder Inspection: no swelling, bruising, discoloration, or obvious deformity or asymmetry  Tender: none  Range of Motion:   Active:forward flexion 165 degrees, external rotation  60 degrees, internal rotation  Bra line.  Strength: forward flexion 5/5, External rotation 5/5    Impingement: negative.  Special tests: Empty Can: Negative      X-RAY INTERPRETATION: 3 views right  shoulder obtained 9/26/2022 were reviewed personally in clinic today with the patient. On my review, there is a healing, mildly displaced fracture of the greater tuberosity. There is surround callus formation.    ASSESSMENT: Genesis Adhikari is a 57 year old female, right  -hand dominant with acute right shoulder pain, mildly displaced right greater tuberosity fracture with incomplete healing      PLAN:   * exam, images reviewed  * continued pain, weakness, stiffness a result of the injury to the shoulder, a fracture  * the fracture is healing, but healing is not complete  * likely another month or so  * don't recommend heavy lifting, reaching, etc.  * recommend continued Physical Therapy working on range of motion at this time  * light use of the arm, ADLs essentially is ok, but nothing heavy or strenuous  * I would not recommend return to work without restrictions at this time, but if she could return with restrictions that would be ok. However, she states she needs to be back 100%.  * note for  work today. No return to work at this time. (in MyChart)  * reassess 4 weeks, sooner if needed  * xrays right shoulder, internal rotation / external rotation / axillary views only.    * All questions were addressed and answered prior to discharge from clinic today. The patient acknowledges an understanding of and agreement with the plan set forth during today's visit. Patient was advised to call our office or MyChart us if any further questions arise upon leaving our office today.      Kali Leblanc M.D., M.S.  Dept. of Orthopaedic Surgery  [facility]      Again, thank you for allowing me to participate in the care of your patient.        Sincerely,        Kali Leblanc MD

## 2022-09-28 NOTE — LETTER
September 28, 2022      Genesis Adhikari  [address] [place] [address]        To Whom It May Concern,     Genesis Adhikari attended clinic here on Sep 28, 2022 for her right shoulder and she should remain off work at this time.   We'll reassess workability in 4 weeks    If you have questions or concerns, please call the clinic at the number listed above.    Sincerely,         Kali Leblanc MD

## 2022-09-28 NOTE — Clinical Note
Joana, I saw Genesis today, she has a greater tuberosity fracture of her shoulder with early but incomplete healing. So, improved but continued shoulder symptoms would be consistent. I will have her continue with Physical Therapy, using the arm for everyday type things, but not return to work.  I'll recheck her in 4 weeks. -TDW

## 2022-10-06 ENCOUNTER — THERAPY VISIT (OUTPATIENT)
Dept: PHYSICAL THERAPY | Facility: CLINIC | Age: 57
End: 2022-10-06
Payer: COMMERCIAL

## 2022-10-06 DIAGNOSIS — M25.511 ACUTE PAIN OF RIGHT SHOULDER: Primary | ICD-10-CM

## 2022-10-06 PROCEDURE — 97110 THERAPEUTIC EXERCISES: CPT | Mod: GP | Performed by: PHYSICAL THERAPIST

## 2022-10-06 PROCEDURE — 97140 MANUAL THERAPY 1/> REGIONS: CPT | Mod: GP | Performed by: PHYSICAL THERAPIST

## 2022-10-06 PROCEDURE — 97112 NEUROMUSCULAR REEDUCATION: CPT | Mod: GP | Performed by: PHYSICAL THERAPIST

## 2022-10-09 ENCOUNTER — HEALTH MAINTENANCE LETTER (OUTPATIENT)
Age: 57
End: 2022-10-09

## 2022-10-17 ENCOUNTER — THERAPY VISIT (OUTPATIENT)
Dept: PHYSICAL THERAPY | Facility: CLINIC | Age: 57
End: 2022-10-17
Payer: COMMERCIAL

## 2022-10-17 DIAGNOSIS — M25.511 ACUTE PAIN OF RIGHT SHOULDER: Primary | ICD-10-CM

## 2022-10-17 PROCEDURE — 97112 NEUROMUSCULAR REEDUCATION: CPT | Mod: GP | Performed by: PHYSICAL THERAPIST

## 2022-10-17 PROCEDURE — 97140 MANUAL THERAPY 1/> REGIONS: CPT | Mod: GP | Performed by: PHYSICAL THERAPIST

## 2022-10-17 PROCEDURE — 97110 THERAPEUTIC EXERCISES: CPT | Mod: GP | Performed by: PHYSICAL THERAPIST

## 2022-10-20 ENCOUNTER — THERAPY VISIT (OUTPATIENT)
Dept: PHYSICAL THERAPY | Facility: CLINIC | Age: 57
End: 2022-10-20
Payer: COMMERCIAL

## 2022-10-20 DIAGNOSIS — M25.511 ACUTE PAIN OF RIGHT SHOULDER: Primary | ICD-10-CM

## 2022-10-20 PROCEDURE — 97140 MANUAL THERAPY 1/> REGIONS: CPT | Mod: GP | Performed by: PHYSICAL THERAPIST

## 2022-10-20 PROCEDURE — 97110 THERAPEUTIC EXERCISES: CPT | Mod: GP | Performed by: PHYSICAL THERAPIST

## 2022-10-25 ENCOUNTER — THERAPY VISIT (OUTPATIENT)
Dept: PHYSICAL THERAPY | Facility: CLINIC | Age: 57
End: 2022-10-25
Payer: COMMERCIAL

## 2022-10-25 DIAGNOSIS — M25.511 ACUTE PAIN OF RIGHT SHOULDER: Primary | ICD-10-CM

## 2022-10-25 PROCEDURE — 97110 THERAPEUTIC EXERCISES: CPT | Mod: GP | Performed by: PHYSICAL THERAPY ASSISTANT

## 2022-10-25 PROCEDURE — 97140 MANUAL THERAPY 1/> REGIONS: CPT | Mod: GP | Performed by: PHYSICAL THERAPY ASSISTANT

## 2022-10-25 NOTE — LETTER
[address]  [facility] [address]  [phone]    2022    Re: Genesis Adhikari   :   1965  MRN:  [id number]   REFERRING PHYSICIAN:   Jose Wolf MD    [facility]    Date of Initial Evaluation:  22  Visits: 8  Rxs Used: 8  Reason for Referral:  Acute pain of right shoulder    PROGRESS  REPORT    Progress reporting period is from 22 to 10/25/22.      SUBJECTIVE  Subjective changes noted by patient:   Pt has been seen for 8 PT sessions, reporting 30% improved. Pt reports increased ROM present although has pain and weakness when using R UE with daily activities. Pt 2 weeks post L radial neck fracture. Pt questions her ability to return to work in the near future. She also questions if she would benefit from a steriod injection to R shoulder for pain management and to assist with recovery.   Current Pain level:  (up to 4/10 with mvmt, no pain at rest)    Initial Pain level: 7/10   Changes in function:  Yes, see goal sheet.      Adverse reaction to treatment or activity: activity - pt will attempt daily activities or household chores that increase pain      OBJECTIVE  Changes noted in objective findings:    R shld AROM: flex 135, abd 130, ER 50, ext/IR reach to R PSIS   (initially: 80, 102, 40, T9 respectively)   R shld PROM: flex 150, abd 145, ER 60.   MMT R shld: flex 4+/5, abd 4/5, IR 4+/5, ER 4/5.   Poor scapular control as hiking present with reaching motion.   Tenderness with palpation to R bicep region.     ASSESSMENT/PLAN  Updated problem list and treatment plan: Diagnosis 1:  R Shoulder pain w/ humeral fracture, complicated by recent L radial head fracture    Pain -  hot/cold therapy, manual therapy, splint/taping/bracing/orthotics, self management, education, directional preference exercise and home program  Decreased ROM/flexibility - manual therapy,  therapeutic exercise, therapeutic activity and home program      Re: Genesis Adhikari   :   1965  Page 2      Decreased strength - therapeutic exercise, therapeutic activities and home program  Inflammation - cold therapy and self management/home program  Impaired muscle performance - neuro re-education and home program  Decreased function - therapeutic activities and home program  Impaired posture - neuro re-education, therapeutic activities and home program  Progress toward STG/LTGs have been made:  Yes (See Goal flow sheet completed today.)  Assessment of Progress: The patient's condition is improving.  The patient has had set backs in their progress.  Self Management Plans:  Patient has been instructed in a home treatment program.  Patient  has been instructed in self management of symptoms.  I have re-evaluated this patient and find that the nature, scope, duration and intensity of the therapy is appropriate for the medical condition of the patient.  Genesis continues to require the following intervention to meet STG and LTGs:  PT    Recommendations:  This patient would benefit from continued therapy.     Frequency:  2 X week, once daily  Duration:  for 4 weeks      The progress note/discharge summary was written in collaboration with and reviewed by the physical therapist.        Thank you for your referral.    INQUIRIES    Therapist: Amanda Hilligoss DPT, Charity Jernigan [facility] [address] [address]  Phone: [phone]  Fax: [phone]

## 2022-10-26 NOTE — PROGRESS NOTES
PROGRESS  REPORT    Progress reporting period is from 8/29/22 to 10/25/22.      SUBJECTIVE  Subjective changes noted by patient:   Pt has been seen for 8 PT sessions, reporting 30% improved. Pt reports increased ROM present although has pain and weakness when using R UE with daily activities. Pt 2 weeks post L radial neck fracture. Pt questions her ability to return to work in the near future. She also questions if she would benefit from a steriod injection to R shoulder for pain management and to assist with recovery.   Current Pain level:  (up to 4/10 with mvmt, no pain at rest)    Initial Pain level: 7/10   Changes in function:  Yes, see goal sheet.      Adverse reaction to treatment or activity: activity - pt will attempt daily activities or household chores that increase pain      OBJECTIVE  Changes noted in objective findings:    R shld AROM: flex 135, abd 130, ER 50, ext/IR reach to R PSIS   (initially: 80, 102, 40, T9 respectively)   R shld PROM: flex 150, abd 145, ER 60.   MMT R shld: flex 4+/5, abd 4/5, IR 4+/5, ER 4/5.   Poor scapular control as hiking present with reaching motion.   Tenderness with palpation to R bicep region.

## 2022-10-26 NOTE — PROGRESS NOTES
ASSESSMENT/PLAN  Updated problem list and treatment plan: Diagnosis 1:  R Shoulder pain w/ humeral fracture, complicated by recent L radial head fracture    Pain -  hot/cold therapy, manual therapy, splint/taping/bracing/orthotics, self management, education, directional preference exercise and home program  Decreased ROM/flexibility - manual therapy, therapeutic exercise, therapeutic activity and home program  Decreased strength - therapeutic exercise, therapeutic activities and home program  Inflammation - cold therapy and self management/home program  Impaired muscle performance - neuro re-education and home program  Decreased function - therapeutic activities and home program  Impaired posture - neuro re-education, therapeutic activities and home program  Progress toward STG/LTGs have been made:  Yes (See Goal flow sheet completed today.)  Assessment of Progress: The patient's condition is improving.  The patient has had set backs in their progress.  Self Management Plans:  Patient has been instructed in a home treatment program.  Patient  has been instructed in self management of symptoms.  I have re-evaluated this patient and find that the nature, scope, duration and intensity of the therapy is appropriate for the medical condition of the patient.  Genesis continues to require the following intervention to meet STG and LTGs:  PT    Recommendations:  This patient would benefit from continued therapy.     Frequency:  2 X week, once daily  Duration:  for 4 weeks      The progress note/discharge summary was written in collaboration with and reviewed by the physical therapist.    Please refer to the daily flowsheet for treatment today, total treatment time and time spent performing 1:1 timed codes.

## 2022-10-28 ENCOUNTER — THERAPY VISIT (OUTPATIENT)
Dept: PHYSICAL THERAPY | Facility: CLINIC | Age: 57
End: 2022-10-28
Payer: COMMERCIAL

## 2022-10-28 DIAGNOSIS — M25.511 ACUTE PAIN OF RIGHT SHOULDER: Primary | ICD-10-CM

## 2022-10-28 PROCEDURE — 97112 NEUROMUSCULAR REEDUCATION: CPT | Mod: GP | Performed by: PHYSICAL THERAPY ASSISTANT

## 2022-10-28 PROCEDURE — 97110 THERAPEUTIC EXERCISES: CPT | Mod: GP | Performed by: PHYSICAL THERAPY ASSISTANT

## 2022-10-31 ENCOUNTER — THERAPY VISIT (OUTPATIENT)
Dept: PHYSICAL THERAPY | Facility: CLINIC | Age: 57
End: 2022-10-31
Payer: COMMERCIAL

## 2022-10-31 DIAGNOSIS — M25.511 ACUTE PAIN OF RIGHT SHOULDER: Primary | ICD-10-CM

## 2022-10-31 PROCEDURE — 97110 THERAPEUTIC EXERCISES: CPT | Mod: GP | Performed by: PHYSICAL THERAPIST

## 2022-10-31 PROCEDURE — 97530 THERAPEUTIC ACTIVITIES: CPT | Mod: GP | Performed by: PHYSICAL THERAPIST

## 2022-11-02 ENCOUNTER — OFFICE VISIT (OUTPATIENT)
Dept: FAMILY MEDICINE | Facility: CLINIC | Age: 57
End: 2022-11-02
Payer: COMMERCIAL

## 2022-11-02 VITALS
HEIGHT: 65 IN | SYSTOLIC BLOOD PRESSURE: 144 MMHG | HEART RATE: 81 BPM | OXYGEN SATURATION: 95 % | TEMPERATURE: 98.4 F | BODY MASS INDEX: 43.72 KG/M2 | RESPIRATION RATE: 20 BRPM | WEIGHT: 262.4 LBS | DIASTOLIC BLOOD PRESSURE: 92 MMHG

## 2022-11-02 DIAGNOSIS — H92.02 LEFT EAR PAIN: Primary | ICD-10-CM

## 2022-11-02 PROCEDURE — 99213 OFFICE O/P EST LOW 20 MIN: CPT | Performed by: FAMILY MEDICINE

## 2022-11-02 ASSESSMENT — PAIN SCALES - GENERAL: PAINLEVEL: MILD PAIN (2)

## 2022-11-02 NOTE — PROGRESS NOTES
Assessment & Plan     (H92.02) Left ear pain  (primary encounter diagnosis)  Comment: Exam today indicated of right ear infection in early stage.  Will treat with Augmentin.  Tylenol or Motrin as needed for pain.  Encouraged not using Q-tip.  Follow-up with her [facility] if the symptoms persist or get worse.    Plan: amoxicillin-clavulanate (AUGMENTIN) 875-125 MG         tablet            She was recommended to get the COVID and influenza vaccination today but she declined.  She was also strongly recommended to follow-up with her PCP for physical in the next couple months.     Her blood pressure is slightly elevated today.  She again encouraged to follow-up with her PCP for it close monitoring/evaluation.  She has no history of high blood pressure.    Return in about 2 months (around 1/2/2023) for Physical Exam.    Nataliia Abbott Mai, MD  [facility]   Genesis is a 57 year old, presenting for the following health issues:  Ear Problem      History of Present Illness       Reason for visit:  Ear ache  Symptom onset:  3-7 days ago  Symptom intensity:  Severe  Symptom progression:  Staying the same  Had these symptoms before:  No  What makes it better:  Ibuprofen    She eats 2-3 servings of fruits and vegetables daily.She consumes 2 sweetened beverage(s) daily.She exercises with enough effort to increase her heart rate 20 to 29 minutes per day.  She exercises with enough effort to increase her heart rate 5 days per week.   She is taking medications regularly.     Genesis was seen today for 3-week history of right ear pain.  It is getting worse today.  She is clean her ear with a Q-tip and not sure if she traumatized her eardrum.  No runny nose or nasal congestion.  No fever or chills.  No coughing or sinus pain/pressure.    Review of Systems   Constitutional, HEENT, cardiovascular, pulmonary, gi and gu systems are negative, except as otherwise noted.      Objective    BP (!) 144/92   Pulse 81   " Temp 98.4  F (36.9  C)   Resp 20   Ht 1.648 m (5' 4.9\")   Wt 119 kg (262 lb 6.4 oz)   LMP  (LMP Unknown)   SpO2 95%   BMI 43.80 kg/m    Body mass index is 43.8 kg/m .  Physical Exam   GENERAL: healthy, alert and no distress  EYES: Eyes grossly normal to inspection, PERRL and conjunctivae and sclerae normal.  No conjunctival injection.  HENT: Left ear canal and TM are normal.  Right ear canal is normal.  Right TM is slightly red with fluid behind the TM.  No drainage. Nares are non-congested. Oropharynx is pink and moist. No tender with palpation to the sinuses.   NECK: Supple, no lymphadenopathy.  RESP: lungs clear to auscultation - no rales, rhonchi or wheezes  CV: regular rate and rhythm, no murmur    No results found for any visits on 11/02/22.                "

## 2022-11-03 ENCOUNTER — TELEPHONE (OUTPATIENT)
Dept: FAMILY MEDICINE | Facility: CLINIC | Age: 57
End: 2022-11-03

## 2022-11-03 NOTE — TELEPHONE ENCOUNTER
Patient  was seen at [facility] yesterday and prescribed amoxicillin for her ear pain.  [facility] provider had said saw fluid left ear.  She states has now taken 3rd dose of amoxicillin and isn't working.  No improvement in symptoms. She states didn't sleep at all last night.  I reviewed chart.  Informed patient she was given augmentin to be taken twice daily.  Did advise she continue with the medication.  To be seen if no relief after few days on antibiotic.  May take ibuprofen if not medically contraindicated for pain relief.  She states has taken ibuprofen 800mg per package instructions.  Did also advise warm water bottle may give some temporary relief.  To be seen  Sooner if symptoms worsen.  She states understanding.  States had thought was given amoxicillin; wasn't aware is augment.  She will continue the course of treatment.  Mihaela KManuel RN

## 2022-11-09 ENCOUNTER — THERAPY VISIT (OUTPATIENT)
Dept: PHYSICAL THERAPY | Facility: CLINIC | Age: 57
End: 2022-11-09
Payer: COMMERCIAL

## 2022-11-09 DIAGNOSIS — M25.511 ACUTE PAIN OF RIGHT SHOULDER: Primary | ICD-10-CM

## 2022-11-09 PROCEDURE — 97110 THERAPEUTIC EXERCISES: CPT | Mod: GP | Performed by: PHYSICAL THERAPY ASSISTANT

## 2022-11-14 ENCOUNTER — OFFICE VISIT (OUTPATIENT)
Dept: FAMILY MEDICINE | Facility: CLINIC | Age: 57
End: 2022-11-14
Payer: COMMERCIAL

## 2022-11-14 VITALS
HEART RATE: 70 BPM | DIASTOLIC BLOOD PRESSURE: 88 MMHG | TEMPERATURE: 97.1 F | OXYGEN SATURATION: 96 % | WEIGHT: 265.6 LBS | BODY MASS INDEX: 44.25 KG/M2 | SYSTOLIC BLOOD PRESSURE: 156 MMHG | HEIGHT: 65 IN | RESPIRATION RATE: 14 BRPM

## 2022-11-14 DIAGNOSIS — F33.0 MAJOR DEPRESSIVE DISORDER, RECURRENT EPISODE, MILD (H): ICD-10-CM

## 2022-11-14 DIAGNOSIS — H60.392 OTHER INFECTIVE ACUTE OTITIS EXTERNA OF LEFT EAR: ICD-10-CM

## 2022-11-14 DIAGNOSIS — Z00.00 ROUTINE GENERAL MEDICAL EXAMINATION AT A HEALTH CARE FACILITY: Primary | ICD-10-CM

## 2022-11-14 DIAGNOSIS — L92.0 GRANULOMA ANNULARE: ICD-10-CM

## 2022-11-14 DIAGNOSIS — I10 PRIMARY HYPERTENSION: ICD-10-CM

## 2022-11-14 DIAGNOSIS — E28.39 ESTROGEN DEFICIENCY: ICD-10-CM

## 2022-11-14 DIAGNOSIS — H61.22 IMPACTED CERUMEN OF LEFT EAR: ICD-10-CM

## 2022-11-14 DIAGNOSIS — E66.01 CLASS 3 SEVERE OBESITY DUE TO EXCESS CALORIES WITH SERIOUS COMORBIDITY IN ADULT, UNSPECIFIED BMI (H): ICD-10-CM

## 2022-11-14 DIAGNOSIS — E66.813 CLASS 3 SEVERE OBESITY DUE TO EXCESS CALORIES WITH SERIOUS COMORBIDITY IN ADULT, UNSPECIFIED BMI (H): ICD-10-CM

## 2022-11-14 LAB
ANION GAP SERPL CALCULATED.3IONS-SCNC: 7 MMOL/L (ref 3–14)
BASOPHILS # BLD AUTO: 0 10E3/UL (ref 0–0.2)
BASOPHILS NFR BLD AUTO: 1 %
BUN SERPL-MCNC: 9 MG/DL (ref 7–30)
CALCIUM SERPL-MCNC: 9.1 MG/DL (ref 8.5–10.1)
CHLORIDE BLD-SCNC: 106 MMOL/L (ref 94–109)
CO2 SERPL-SCNC: 26 MMOL/L (ref 20–32)
CREAT SERPL-MCNC: 0.74 MG/DL (ref 0.52–1.04)
EOSINOPHIL # BLD AUTO: 0.2 10E3/UL (ref 0–0.7)
EOSINOPHIL NFR BLD AUTO: 3 %
ERYTHROCYTE [DISTWIDTH] IN BLOOD BY AUTOMATED COUNT: 12.5 % (ref 10–15)
GFR SERPL CREATININE-BSD FRML MDRD: >90 ML/MIN/1.73M2
GLUCOSE BLD-MCNC: 102 MG/DL (ref 70–99)
HBA1C MFR BLD: 5.5 % (ref 0–5.6)
HCT VFR BLD AUTO: 41.7 % (ref 35–47)
HGB BLD-MCNC: 13.8 G/DL (ref 11.7–15.7)
IMM GRANULOCYTES # BLD: 0 10E3/UL
IMM GRANULOCYTES NFR BLD: 0 %
LYMPHOCYTES # BLD AUTO: 1.6 10E3/UL (ref 0.8–5.3)
LYMPHOCYTES NFR BLD AUTO: 36 %
MCH RBC QN AUTO: 30.5 PG (ref 26.5–33)
MCHC RBC AUTO-ENTMCNC: 33.1 G/DL (ref 31.5–36.5)
MCV RBC AUTO: 92 FL (ref 78–100)
MONOCYTES # BLD AUTO: 0.4 10E3/UL (ref 0–1.3)
MONOCYTES NFR BLD AUTO: 8 %
NEUTROPHILS # BLD AUTO: 2.3 10E3/UL (ref 1.6–8.3)
NEUTROPHILS NFR BLD AUTO: 52 %
PLATELET # BLD AUTO: 169 10E3/UL (ref 150–450)
POTASSIUM BLD-SCNC: 4.4 MMOL/L (ref 3.4–5.3)
RBC # BLD AUTO: 4.52 10E6/UL (ref 3.8–5.2)
SODIUM SERPL-SCNC: 139 MMOL/L (ref 133–144)
TSH SERPL DL<=0.005 MIU/L-ACNC: 1.28 MU/L (ref 0.4–4)
WBC # BLD AUTO: 4.5 10E3/UL (ref 4–11)

## 2022-11-14 PROCEDURE — 85025 COMPLETE CBC W/AUTO DIFF WBC: CPT | Performed by: NURSE PRACTITIONER

## 2022-11-14 PROCEDURE — 99214 OFFICE O/P EST MOD 30 MIN: CPT | Mod: 25 | Performed by: NURSE PRACTITIONER

## 2022-11-14 PROCEDURE — 99396 PREV VISIT EST AGE 40-64: CPT | Performed by: NURSE PRACTITIONER

## 2022-11-14 PROCEDURE — 80048 BASIC METABOLIC PNL TOTAL CA: CPT | Performed by: NURSE PRACTITIONER

## 2022-11-14 PROCEDURE — 84443 ASSAY THYROID STIM HORMONE: CPT | Performed by: NURSE PRACTITIONER

## 2022-11-14 PROCEDURE — 36415 COLL VENOUS BLD VENIPUNCTURE: CPT | Performed by: NURSE PRACTITIONER

## 2022-11-14 PROCEDURE — 69209 REMOVE IMPACTED EAR WAX UNI: CPT | Mod: LT | Performed by: NURSE PRACTITIONER

## 2022-11-14 PROCEDURE — 83036 HEMOGLOBIN GLYCOSYLATED A1C: CPT | Performed by: NURSE PRACTITIONER

## 2022-11-14 RX ORDER — OFLOXACIN 3 MG/ML
5 SOLUTION AURICULAR (OTIC) 2 TIMES DAILY
Qty: 4 ML | Refills: 0 | Status: SHIPPED | OUTPATIENT
Start: 2022-11-14 | End: 2022-11-21

## 2022-11-14 RX ORDER — CITALOPRAM HYDROBROMIDE 20 MG/1
TABLET ORAL
Qty: 135 TABLET | Refills: 1 | Status: SHIPPED | OUTPATIENT
Start: 2022-11-14 | End: 2023-07-18

## 2022-11-14 RX ORDER — LOSARTAN POTASSIUM 25 MG/1
25 TABLET ORAL DAILY
Qty: 30 TABLET | Refills: 1 | Status: SHIPPED | OUTPATIENT
Start: 2022-11-14 | End: 2022-12-12

## 2022-11-14 ASSESSMENT — ENCOUNTER SYMPTOMS
BREAST MASS: 0
MYALGIAS: 1
DIARRHEA: 1

## 2022-11-14 ASSESSMENT — ANXIETY QUESTIONNAIRES
6. BECOMING EASILY ANNOYED OR IRRITABLE: NOT AT ALL
GAD7 TOTAL SCORE: 0
2. NOT BEING ABLE TO STOP OR CONTROL WORRYING: NOT AT ALL
1. FEELING NERVOUS, ANXIOUS, OR ON EDGE: NOT AT ALL
GAD7 TOTAL SCORE: 0
7. FEELING AFRAID AS IF SOMETHING AWFUL MIGHT HAPPEN: NOT AT ALL
5. BEING SO RESTLESS THAT IT IS HARD TO SIT STILL: NOT AT ALL
3. WORRYING TOO MUCH ABOUT DIFFERENT THINGS: NOT AT ALL
IF YOU CHECKED OFF ANY PROBLEMS ON THIS QUESTIONNAIRE, HOW DIFFICULT HAVE THESE PROBLEMS MADE IT FOR YOU TO DO YOUR WORK, TAKE CARE OF THINGS AT HOME, OR GET ALONG WITH OTHER PEOPLE: NOT DIFFICULT AT ALL

## 2022-11-14 ASSESSMENT — PATIENT HEALTH QUESTIONNAIRE - PHQ9
5. POOR APPETITE OR OVEREATING: NOT AT ALL
SUM OF ALL RESPONSES TO PHQ QUESTIONS 1-9: 1

## 2022-11-14 ASSESSMENT — PAIN SCALES - GENERAL: PAINLEVEL: MILD PAIN (2)

## 2022-11-14 NOTE — PROGRESS NOTES
oflox     SUBJECTIVE:   CC: Genesis is an 57 year old who presents for preventive health visit.       Patient has been advised of split billing requirements and indicates understanding: Yes  Healthy Habits:     Getting at least 3 servings of Calcium per day:  Yes    Bi-annual eye exam:  Yes    Dental care twice a year:  NO    Sleep apnea or symptoms of sleep apnea:  None    Diet:  Regular (no restrictions)    Frequency of exercise:  2-3 days/week    Duration of exercise:  15-30 minutes    Taking medications regularly:  Yes    Medication side effects:  Not applicable    PHQ-2 Total Score: 0    Additional concerns today:  Yes    Left ear pain - continued after Amox.   Had loose stools starting 1 day after starting.   Having 2-3 stools/day. PO is jonah     Hearing- decreased left ear. Feels q-tip in ear  Abrupt onset- no.     Granular annulare on Plaquenil- working well,           Not taking calcium   Due for DEXA    Today's PHQ-2 Score:   PHQ-2 (  [employer]) 2022   Q1: Little interest or pleasure in doing things 0   Q2: Feeling down, depressed or hopeless 0   PHQ-2 Score 0   PHQ-2 Total Score (12-17 Years)- Positive if 3 or more points; Administer PHQ-A if positive -   Q1: Little interest or pleasure in doing things Not at all   Q2: Feeling down, depressed or hopeless Not at all   PHQ-2 Score 0       Abuse: Current or Past (Physical, Sexual or Emotional) - No  Do you feel safe in your environment? Yes        Social History     Tobacco Use     Smoking status: Former     Packs/day: 0.50     Years: 25.00     Pack years: 12.50     Types: Cigarettes     Quit date: 2018     Years since quittin.7     Smokeless tobacco: Never     Tobacco comments:     quit 2018    Substance Use Topics     Alcohol use: Yes     Alcohol/week: 10.0 standard drinks     Comment: 4-5 per week          Alcohol Use 2022   Prescreen: >3 drinks/day or >7 drinks/week? No   Prescreen: >3 drinks/day or >7 drinks/week? -        Reviewed orders with patient.  Reviewed health maintenance and updated orders accordingly - Yes  Lab work is in process    Breast Cancer Screening:    FHS-7:   Breast CA Risk Assessment (FHS-7) 8/3/2022 11/14/2022   Did any of your first-degree relatives have breast or ovarian cancer? No Yes   Did any of your relatives have bilateral breast cancer? No Unknown   Did any man in your family have breast cancer? No No   Did any woman in your family have breast and ovarian cancer? No Yes   Did any woman in your family have breast cancer before age 50 y? Yes Yes   Do you have 2 or more relatives with breast and/or ovarian cancer? Yes Yes   Do you have 2 or more relatives with breast and/or bowel cancer? Yes No     Declined genetic referral. Both grandmother's had breast cancer, will keep up with mammograms. .     Mammogram Screening: Recommended mammography every 1-2 years with patient discussion and risk factor consideration  Pertinent mammograms are reviewed under the imaging tab.    History of abnormal Pap smear:   NO - age 30-65 PAP every 5 years with negative HPV co-testing recommended  Last 3 Pap Results:   PAP (no units)   Date Value   06/24/2016 NIL   05/07/2009 NIL     Last 3 Pap and HPV Results:   PAP / HPV Latest Ref Rng & Units 1/10/2022 6/24/2016 5/7/2009   PAP   Negative for Intraepithelial Lesion or Malignancy (NILM) - -   PAP (Historical) - - NIL NIL   HPV16 Negative Negative Negative -   HPV18 Negative Negative Negative -   HRHPV Negative Negative Negative -     PAP / HPV Latest Ref Rng & Units 1/10/2022 6/24/2016 5/7/2009   PAP   Negative for Intraepithelial Lesion or Malignancy (NILM) - -   PAP (Historical) - - NIL NIL   HPV16 Negative Negative Negative -   HPV18 Negative Negative Negative -   HRHPV Negative Negative Negative -     Reviewed and updated as needed this visit by clinical staff   Tobacco  Allergies  Meds              Reviewed and updated as needed this visit by Provider               "       Review of Systems   HENT: Positive for ear pain and hearing loss.    Gastrointestinal: Positive for diarrhea.   Breasts:  Negative for tenderness, breast mass and discharge.   Genitourinary: Negative for pelvic pain, vaginal bleeding and vaginal discharge.   Musculoskeletal: Positive for myalgias.     CONSTITUTIONAL: NEGATIVE for fever, chills, change in weight  INTEGUMENTARY/SKIN: NEGATIVE for worrisome rashes, moles or lesions  EYES: NEGATIVE for vision changes or irritation  RESP: NEGATIVE for significant cough or SOB  CV: NEGATIVE for chest pain, palpitations or peripheral edema  : NEGATIVE for unusual urinary or vaginal symptoms. No vaginal bleeding.  NEURO: NEGATIVE for weakness, dizziness or paresthesias  PSYCHIATRIC: NEGATIVE for changes in mood or affect      Following ortho for left elbow fracture next week- has pronation pain occ.     OBJECTIVE:   BP (!) 156/88   Pulse 70   Temp 97.1  F (36.2  C) (Temporal)   Resp 14   Ht 1.655 m (5' 5.16\")   Wt 120.5 kg (265 lb 9.6 oz)   LMP  (LMP Unknown)   SpO2 96%   BMI 43.99 kg/m    Physical Exam  GENERAL: healthy, alert and no distress  EYES: Eyes grossly normal to inspection, PERRL and conjunctivae and sclerae normal  HENT: normal cephalic/atraumatic, right ear: normal TM;  occluded with wax and ? FB in left ear,   Ear was revels TM- irritated, and irregular surfaced, mild canal erythema, nose and mouth without ulcers or lesions, oropharynx clear and oral mucous membranes moist  NECK: no adenopathy, no asymmetry, masses, or scars and thyroid normal to palpation  RESP: lungs clear to auscultation - no rales, rhonchi or wheezes  BREAST: pt. deferred  CV: regular rate and rhythm, normal S1 S2, no S3 or S4, no murmur, click or rub, no peripheral edema and peripheral pulses strong  ABDOMEN: soft, nontender, no hepatosplenomegaly, no masses and bowel sounds normal  MS: no gross musculoskeletal defects noted, no edema  SKIN: no suspicious lesions or " "rashes  NEURO: Normal strength and tone, mentation intact and speech normal  PSYCH: mentation appears normal, affect normal/bright    Diagnostic Test Results:  Labs reviewed in Epic    ASSESSMENT/PLAN:       ICD-10-CM    1. Routine general medical examination at a health care facility  Z00.00 CBC with Platelets & Differential     TSH with free T4 reflex     Hemoglobin A1c     DX Hip/Pelvis/Spine     CBC with Platelets & Differential     TSH with free T4 reflex     Hemoglobin A1c      2. Granuloma annulare  L92.0       3. Class 3 severe obesity due to excess calories with serious comorbidity in adult, unspecified BMI (H)  E66.01 Med Therapy Management Referral     Basic metabolic panel     Basic metabolic panel      4. Major depressive disorder, recurrent episode, mild (H)  F33.0 citalopram (CELEXA) 20 MG tablet      5. Primary hypertension  I10 losartan (COZAAR) 25 MG tablet      6. Impacted cerumen of left ear  H61.22 ME REMOVAL IMPACTED CERUMEN IRRIGATION/LVG UNILAT      7. Estrogen deficiency  E28.39 DX Hip/Pelvis/Spine      8. Other infective acute otitis externa of left ear  H60.392 ofloxacin (FLOXIN) 0.3 % otic solution          Patient has been advised of split billing requirements and indicates understanding: Yes      COUNSELING:  Reviewed preventive health counseling, as reflected in patient instructions       Regular exercise       Healthy diet/nutrition       Immunizations    Pt. deferred             Osteoporosis prevention/bone health       Colorectal Cancer Screening       Mammogram  Ear- home cares, monitoirng, ear gtts. F/u if not better  HTN- new diagnosis, weight mgmt, [facility]. Walking program.   BP re-check in 2 weeks.   Derm- continue GA cares.   Mood- stable, updating refills.       Estimated body mass index is 43.99 kg/m  as calculated from the following:    Height as of this encounter: 1.655 m (5' 5.16\").    Weight as of this encounter: 120.5 kg (265 lb 9.6 oz).    Weight " management plan: Discussed healthy diet and exercise guidelines nutrition referral inna.     She reports that she quit smoking about 4 years ago. Her smoking use included cigarettes. She has a 12.50 pack-year smoking history. She has never used smokeless tobacco.      Counseling Resources:  ATP IV Guidelines  Pooled Cohorts Equation Calculator  Breast Cancer Risk Calculator  BRCA-Related Cancer Risk Assessment: FHS-7 Tool  FRAX Risk Assessment  ICSI Preventive Guidelines  Dietary Guidelines for Americans, 2010  USDA's MyPlate  ASA Prophylaxis  Lung CA Screening    AMADOU Koenig CNP  [facility] [employer]

## 2022-11-14 NOTE — NURSING NOTE
Patient identified using two patient identifiers.  Ear exam showing wax occlusion completed by provider.  Solution: warm water was placed in the left ear(s) via irrigation tool: elephant ear.    Rajendra Ma MA on 11/14/2022 at 12:23 PM

## 2022-11-15 NOTE — RESULT ENCOUNTER NOTE
Genesis,  I have reviewed your labs, and they are all normal. If you have questions, please notify me through MyChart or a telephone call.   Joana Taylor, DNP

## 2022-11-16 ENCOUNTER — TELEPHONE (OUTPATIENT)
Dept: FAMILY MEDICINE | Facility: CLINIC | Age: 57
End: 2022-11-16

## 2022-11-16 DIAGNOSIS — E66.813 CLASS 3 SEVERE OBESITY DUE TO EXCESS CALORIES WITH SERIOUS COMORBIDITY IN ADULT, UNSPECIFIED BMI (H): Primary | ICD-10-CM

## 2022-11-16 DIAGNOSIS — E66.01 CLASS 3 SEVERE OBESITY DUE TO EXCESS CALORIES WITH SERIOUS COMORBIDITY IN ADULT, UNSPECIFIED BMI (H): Primary | ICD-10-CM

## 2022-11-16 RX ORDER — SEMAGLUTIDE 1.34 MG/ML
INJECTION, SOLUTION SUBCUTANEOUS
Qty: 1.5 ML | Refills: 1 | Status: SHIPPED | OUTPATIENT
Start: 2022-11-16 | End: 2023-01-11

## 2022-11-16 NOTE — TELEPHONE ENCOUNTER
Prescription for ozempic sent to [facility] pharmacy for  prior to MTM appointment on 12/7/2022.  Roselia Eldridge, Pharm.D, [facility]  Medication Therapy Management Pharmacist

## 2022-11-16 NOTE — TELEPHONE ENCOUNTER
Genesis London has a question about if you will have the medication at her appt.  She asked that you call her.    ZBIGNIEW Rodriguez

## 2022-11-16 NOTE — PROGRESS NOTES
11/16- left ear pain, had just started gtts this am. If not getting better in 2 days may have Augmentin 875 mg BID RX for 10 days

## 2022-11-28 ENCOUNTER — ALLIED HEALTH/NURSE VISIT (OUTPATIENT)
Dept: FAMILY MEDICINE | Facility: CLINIC | Age: 57
End: 2022-11-28
Payer: COMMERCIAL

## 2022-11-28 VITALS — OXYGEN SATURATION: 95 % | HEART RATE: 82 BPM | SYSTOLIC BLOOD PRESSURE: 134 MMHG | DIASTOLIC BLOOD PRESSURE: 72 MMHG

## 2022-11-28 DIAGNOSIS — I10 PRIMARY HYPERTENSION: Primary | ICD-10-CM

## 2022-11-28 PROCEDURE — 99207 PR NO CHARGE NURSE ONLY: CPT

## 2022-11-28 NOTE — NURSING NOTE
I met with Genesis Adhikari at the request of Joana Taylor to recheck her blood pressure.  Blood pressure medications on the med list were reviewed with patient.    Patient has taken all medications as per usual regimen: Yes  Patient reports tolerating them without any issues or concerns: Yes    Vitals:    11/28/22 1101   BP: 134/72   Pulse: 82   SpO2: 95%       Blood pressure was taken, previous encounter was reviewed, recorded blood pressure below 140/90.  Patient was discharged and the note will be sent to the provider for final review.     Rajendra Ma MA on 11/28/2022 at 11:52 AM

## 2022-12-06 NOTE — PROGRESS NOTES
Clinical Pharmacy Consult:                                                    Genesis Adhikari is a 57 year old female coming in for a clinical pharmacist consult.  She was referred to me from Joana Taylor.     Reason for Consult: Ozempic Start    Discussion: Discussed storage, dosing, administration and side effects of Ozempic. Patient brought in her prescription for Ozempic and first dose was administered in clinic by patient.     Plan:  1. Take Ozempic 0.25mg every week for 4 weeks, then increase dose to 0.5mg every week for the next 2 weeks and then refill Ozempic at pharmacy and continue 0.5mg weekly for the next 4 weeks.     2.Schedule an appointment with Joana for end of January, early February to ask for 1mg dose.     Roselia Eldridge, Pharm.D, [facility]CP  Medication Therapy Management Pharmacist

## 2022-12-07 ENCOUNTER — OFFICE VISIT (OUTPATIENT)
Dept: PHARMACY | Facility: CLINIC | Age: 57
End: 2022-12-07
Payer: COMMERCIAL

## 2022-12-07 VITALS — WEIGHT: 261.5 LBS | BODY MASS INDEX: 43.31 KG/M2

## 2022-12-07 DIAGNOSIS — E66.813 CLASS 3 SEVERE OBESITY DUE TO EXCESS CALORIES WITH SERIOUS COMORBIDITY IN ADULT, UNSPECIFIED BMI (H): ICD-10-CM

## 2022-12-07 DIAGNOSIS — E66.01 CLASS 3 SEVERE OBESITY DUE TO EXCESS CALORIES WITH SERIOUS COMORBIDITY IN ADULT, UNSPECIFIED BMI (H): ICD-10-CM

## 2022-12-07 PROCEDURE — 99207 PR NO CHARGE LOS: CPT | Performed by: PHARMACIST

## 2022-12-07 NOTE — PATIENT INSTRUCTIONS
"Recommendations from today's MTM visit:                                                    Take Ozempic 0.25mg every week for 4 weeks, then increase dose to 0.5mg every week for the next 2 weeks and then refill Ozempic at pharmacy and continue 0.5mg weekly for the next 4 weeks.   Schedule an appointment with Joana for end of January, early February to ask for 1mg dose.     It was great speaking with you today.  I value your experience and would be very thankful for your time in providing feedback in our clinic survey. In the next few days, you may receive an email or text message from [employer] with a link to a survey related to your  clinical pharmacist.\"     To schedule another MTM appointment, please call the clinic directly or you may call the MTM scheduling line at [phone] or toll-free at [phone].     My Clinical Pharmacist's contact information:                                                      Please feel free to contact me with any questions or concerns you have.      Roselia Eldridge, Pharm.D, [facility]  Medication Therapy Management Pharmacist  [phone]    "

## 2022-12-08 DIAGNOSIS — I10 PRIMARY HYPERTENSION: ICD-10-CM

## 2022-12-12 RX ORDER — LOSARTAN POTASSIUM 25 MG/1
TABLET ORAL
Qty: 30 TABLET | Refills: 4 | Status: SHIPPED | OUTPATIENT
Start: 2022-12-12 | End: 2023-05-05

## 2023-01-10 PROBLEM — M25.511 ACUTE PAIN OF RIGHT SHOULDER: Status: RESOLVED | Noted: 2022-08-29 | Resolved: 2023-01-10

## 2023-01-10 NOTE — PROGRESS NOTES
"Discharge Note    Progress reporting period is from last progress note on 10/25/22 to Nov 9, 2022.    Genesis failed to follow up and current status is unknown.  Please see information below for last relevant information on current status.  Patient seen for 11 visits.    SUBJECTIVE  Subjective changes noted by patient:  Pt reports she started work on Tuesday and her R UE is sore but \"in a good way\". She had pain and difficulty with L elbow throughout the day.  .  Current pain level is 2/10.     Previous pain level was  7/10.   Changes in function:  Yes (See Goal flowsheet attached for changes in current functional level)  Adverse reaction to treatment or activity: None    OBJECTIVE  Changes noted in objective findings: AROM R: Flex 145, . Improved scapular control.     ASSESSMENT/PLAN  Diagnosis: R Shoulder Pain   Updated problem list and treatment plan:   Pain - HEP  Decreased ROM/flexibility - HEP  Decreased function - HEP  STG/LTGs have been met or progress has been made towards goals:  Yes, please see goal flowsheet for most current information  Assessment of Progress: current status is unknown.    Last current status: Pt is progressing as expected   Self Management Plans:  HEP  I have re-evaluated this patient and find that the nature, scope, duration and intensity of the therapy is appropriate for the medical condition of the patient.  Genesis continues to require the following intervention to meet STG and LTG's:  HEP.    Recommendations:  Discharge with current home program.  Patient to follow up with MD as needed.    Please refer to the daily flowsheet for treatment today, total treatment time and time spent performing 1:1 timed codes.    Wilber Ely,PT, DPT, OCS    "

## 2023-01-16 ENCOUNTER — TELEPHONE (OUTPATIENT)
Dept: FAMILY MEDICINE | Facility: CLINIC | Age: 58
End: 2023-01-16
Payer: COMMERCIAL

## 2023-01-16 NOTE — TELEPHONE ENCOUNTER
Patient is calling to see if she can get her Ozempic ordered in the 1mg doses so she can get it cheaper. She has enough doses for 3 weeks but would like a new script sent for her next refill as this past one cost her $400. This can be sent to [facility].    Dee Deshpande,UGON, RN

## 2023-01-16 NOTE — LETTER
January 20, 2023      Genesis Adhikari  [address] [place] [address]              Dear Genesis,    I'm writing to inform you that you are due for a follow-up appointment for the Ozempic medication. This can be a virtual visit. Your provider will need a log of your weight loss at the time of this visit.     You can schedule this via [employer] or by calling [phone].     Have a great day!     [facility]

## 2023-01-17 ENCOUNTER — MYC MEDICAL ADVICE (OUTPATIENT)
Dept: FAMILY MEDICINE | Facility: CLINIC | Age: 58
End: 2023-01-17
Payer: COMMERCIAL

## 2023-01-17 NOTE — TELEPHONE ENCOUNTER
Advise virtual visit for follow-up to this medication. Will need home weight loss data for this visit.   AMADOU Koenig CNP

## 2023-01-17 NOTE — TELEPHONE ENCOUNTER
Staff sent [employer] message to notify patient of appointment that needs scheduling.

## 2023-01-27 NOTE — TELEPHONE ENCOUNTER
Pt called back, states that she hasn't even had a full dose yet so there is nothing to report.     Please call pt, jesus isn't working for her

## 2023-01-30 NOTE — TELEPHONE ENCOUNTER
Call pt. Need to have virtual- telephone visit for Ozempic. There are questions to review.   AMADOU Koenig CNP

## 2023-01-30 NOTE — TELEPHONE ENCOUNTER
Patient calling because she would like a refill.  Review of chart: she needs a follow up visit.  Appointment made for tomorrow.  Will have weight herself in the am and follow up at appointment tomorrow.    UGO DavisN, RN, PHN  [facility] ~ Registered Nurse  Clinic Triage ~ [place] [facility] [place]  January 30, 2023

## 2023-01-31 ENCOUNTER — VIRTUAL VISIT (OUTPATIENT)
Dept: FAMILY MEDICINE | Facility: CLINIC | Age: 58
End: 2023-01-31
Payer: COMMERCIAL

## 2023-01-31 DIAGNOSIS — E66.01 CLASS 3 SEVERE OBESITY DUE TO EXCESS CALORIES WITH SERIOUS COMORBIDITY IN ADULT, UNSPECIFIED BMI (H): Primary | ICD-10-CM

## 2023-01-31 DIAGNOSIS — E66.813 CLASS 3 SEVERE OBESITY DUE TO EXCESS CALORIES WITH SERIOUS COMORBIDITY IN ADULT, UNSPECIFIED BMI (H): Primary | ICD-10-CM

## 2023-01-31 DIAGNOSIS — R11.0 NAUSEA: ICD-10-CM

## 2023-01-31 PROCEDURE — 99214 OFFICE O/P EST MOD 30 MIN: CPT | Mod: 95 | Performed by: NURSE PRACTITIONER

## 2023-01-31 RX ORDER — SEMAGLUTIDE 1.34 MG/ML
INJECTION, SOLUTION SUBCUTANEOUS
COMMUNITY
Start: 2023-01-16 | End: 2023-05-23

## 2023-01-31 RX ORDER — ONDANSETRON 4 MG/1
4 TABLET, FILM COATED ORAL EVERY 8 HOURS PRN
Qty: 5 TABLET | Refills: 0 | Status: SHIPPED | OUTPATIENT
Start: 2023-01-31 | End: 2023-07-18

## 2023-01-31 NOTE — PATIENT INSTRUCTIONS
Genesis,   Please also incorporate more exercise and healthy food changes along the way to aid with weight loss maintenance and to continue to develop healthy changes.   Have a great day,   AMADOU Koenig CNP

## 2023-01-31 NOTE — PROGRESS NOTES
Genesis is a 57 year old who is being evaluated via a billable telephone visit.      What phone number would you like to be contacted at? [phone]    How would you like to obtain your AVS? Mail a copy     Distant Location (provider location):  Off-site    Assessment & Plan     Class 3 severe obesity due to excess calories with serious comorbidity in adult, unspecified BMI (H)  Adjusting to next dose. Discussed nausea- may need to stay at next doing if not improving with transitions.   Need weight in clinic prior to next dose.   Then monthly for next 3 months.   Continue Healthy habits.     - Semaglutide, 1 MG/DOSE, (OZEMPIC) 4 MG/3ML pen; Inject 1 mg Subcutaneous every 7 days    Nausea  Should be limited- short supply given. Will not fill long term.   - ondansetron (ZOFRAN) 4 MG tablet; Take 1 tablet (4 mg) by mouth every 8 hours as needed for nausea                 Return in about 3 weeks (around 2/21/2023) for re-check Medical assistnat visit with MA for weight. .   Follow-up Visit   Expected date: Feb 21, 2023      Follow Up Appointment Details:     Follow-up with whom?: My Team    Follow-Up for what?: Acute Issue Recheck    Additional Details: MA weight check in clinic    How?: In Person    Is this an as-needed follow-up?: No                    AMADOU Koenig [facility] is a 57 year old, presenting for the following health issues:  Weight Problem      HPI     Medication Followup of Ozempic    Taking Medication as prescribed: yes    Side Effects:  Upset stomach  Medication Helping Symptoms:  Yes    Appetite changes- maybe slightly.   Getting full faster. Leaving more food left on plate.     Mild constipation.   Occ. Stomach upset.  Will notice upset stomach.   + nausea at times. 2-3 times/week.   Not lasting long- less than 1 hour.   No vomiting .  Not noting increased urination.   It been averaging $200-400/month  Will need to see if cost if improved on  this next dosing.     242 LBS.   Does not know start weight.   Thinks she may be down maybe <10 LBS, but not sure. Was workign 7 days around [employer].   Not thinking clothes are getting better.             Review of Systems   Constitutional, HEENT, cardiovascular, pulmonary, gi and gu systems are negative, except as otherwise noted.      Objective           Vitals:  No vitals were obtained today due to virtual visit.    Physical Exam   healthy, alert and no distress  PSYCH: Alert and oriented times 3; coherent speech, normal   rate and volume, able to articulate logical thoughts, able   to abstract reason, no tangential thoughts, no hallucinations   or delusions  Her affect is normal and pleasant  RESP: No cough, no audible wheezing, able to talk in full sentences  Remainder of exam unable to be completed due to telephone visits                Phone call duration: 11 minutes

## 2023-02-01 ENCOUNTER — NURSE TRIAGE (OUTPATIENT)
Dept: FAMILY MEDICINE | Facility: CLINIC | Age: 58
End: 2023-02-01
Payer: COMMERCIAL

## 2023-02-01 NOTE — TELEPHONE ENCOUNTER
Pt was seen yesterday and her Ozempic was increased from 0.5mg every 7 days to 1mg every 7 days. Pt was supposed to start the 1mg this mornig but her pen only allows her to give herself 0.5mg at a time. Pt is wondering if she can just do 2 doses of the 0.5mg to equal the 1.  Confirmed with patient that this would be the correct dose.

## 2023-02-17 ENCOUNTER — ALLIED HEALTH/NURSE VISIT (OUTPATIENT)
Dept: FAMILY MEDICINE | Facility: CLINIC | Age: 58
End: 2023-02-17
Payer: COMMERCIAL

## 2023-02-17 VITALS — WEIGHT: 244.5 LBS | BODY MASS INDEX: 40.49 KG/M2 | DIASTOLIC BLOOD PRESSURE: 72 MMHG | SYSTOLIC BLOOD PRESSURE: 126 MMHG

## 2023-02-17 DIAGNOSIS — Z01.30 BP CHECK: Primary | ICD-10-CM

## 2023-02-17 PROCEDURE — 99207 PR NO CHARGE NURSE ONLY: CPT

## 2023-02-17 NOTE — PROGRESS NOTES
Patient presents to clinic for weight and blood pressure check.    Genesis Adhikari is a 57 year old patient who comes in today for a Blood Pressure check.  Initial BP:  /72   Wt 110.9 kg (244 lb 8 oz)   LMP 03/30/2010   BMI 40.49 kg/m       Data Unavailable  Disposition: results routed to provider    Today's weight: 244 lbs 8 oz

## 2023-02-22 DIAGNOSIS — E66.813 CLASS 3 SEVERE OBESITY DUE TO EXCESS CALORIES WITH SERIOUS COMORBIDITY IN ADULT, UNSPECIFIED BMI (H): ICD-10-CM

## 2023-02-22 DIAGNOSIS — E66.01 CLASS 3 SEVERE OBESITY DUE TO EXCESS CALORIES WITH SERIOUS COMORBIDITY IN ADULT, UNSPECIFIED BMI (H): ICD-10-CM

## 2023-02-24 RX ORDER — SEMAGLUTIDE 1.34 MG/ML
INJECTION, SOLUTION SUBCUTANEOUS
Qty: 3 ML | Refills: 2 | Status: SHIPPED | OUTPATIENT
Start: 2023-02-24 | End: 2023-05-02

## 2023-03-10 DIAGNOSIS — I10 PRIMARY HYPERTENSION: ICD-10-CM

## 2023-03-10 RX ORDER — LOSARTAN POTASSIUM 25 MG/1
TABLET ORAL
Qty: 90 TABLET | Refills: 1 | OUTPATIENT
Start: 2023-03-10

## 2023-05-01 DIAGNOSIS — E66.01 CLASS 3 SEVERE OBESITY DUE TO EXCESS CALORIES WITH SERIOUS COMORBIDITY IN ADULT, UNSPECIFIED BMI (H): ICD-10-CM

## 2023-05-01 DIAGNOSIS — E66.813 CLASS 3 SEVERE OBESITY DUE TO EXCESS CALORIES WITH SERIOUS COMORBIDITY IN ADULT, UNSPECIFIED BMI (H): ICD-10-CM

## 2023-05-01 NOTE — LETTER
May 4, 2023      Genesis Adhikari  [address] [place] [address]            Your provider has sent a cici refill for your OZEMPIC. You are due for labs before any more refills will be sent.      Please schedule via [employer] or call [phone].      Have a good day,      [facility]

## 2023-05-02 RX ORDER — SEMAGLUTIDE 1.34 MG/ML
INJECTION, SOLUTION SUBCUTANEOUS
Qty: 3 ML | Refills: 0 | Status: SHIPPED | OUTPATIENT
Start: 2023-05-02 | End: 2023-05-23

## 2023-05-02 NOTE — TELEPHONE ENCOUNTER
Staff sent [employer] message to notify patient of required appointment for further medication refills.

## 2023-05-02 NOTE — TELEPHONE ENCOUNTER
Pending Prescriptions:                       Disp   Refills    OZEMPIC (1 MG/DOSE) 4 MG/3ML pen [Pharmac*3 mL   0            Sig: INJECT 1MG SUBCUTANEOUSLY  WEEKLY (EVERY 7 DAYS)    Medication is being filled for 1 time cici refill only due to:  Patient is due for lab visit    Please call and help schedule.  Thank you!

## 2023-05-05 DIAGNOSIS — I10 PRIMARY HYPERTENSION: ICD-10-CM

## 2023-05-05 RX ORDER — LOSARTAN POTASSIUM 25 MG/1
TABLET ORAL
Qty: 90 TABLET | Refills: 2 | Status: SHIPPED | OUTPATIENT
Start: 2023-05-05 | End: 2024-01-09

## 2023-05-23 ENCOUNTER — VIRTUAL VISIT (OUTPATIENT)
Dept: FAMILY MEDICINE | Facility: CLINIC | Age: 58
End: 2023-05-23
Payer: COMMERCIAL

## 2023-05-23 DIAGNOSIS — E66.01 CLASS 3 SEVERE OBESITY DUE TO EXCESS CALORIES WITH SERIOUS COMORBIDITY IN ADULT, UNSPECIFIED BMI (H): ICD-10-CM

## 2023-05-23 DIAGNOSIS — E66.813 CLASS 3 SEVERE OBESITY DUE TO EXCESS CALORIES WITH SERIOUS COMORBIDITY IN ADULT, UNSPECIFIED BMI (H): ICD-10-CM

## 2023-05-23 PROCEDURE — 99213 OFFICE O/P EST LOW 20 MIN: CPT | Mod: TEL | Performed by: NURSE PRACTITIONER

## 2023-05-23 RX ORDER — SEMAGLUTIDE 1.34 MG/ML
INJECTION, SOLUTION SUBCUTANEOUS
Qty: 9 ML | Refills: 0 | Status: SHIPPED | OUTPATIENT
Start: 2023-05-23 | End: 2023-07-18 | Stop reason: DRUGHIGH

## 2023-05-23 RX ORDER — SEMAGLUTIDE 1.34 MG/ML
INJECTION, SOLUTION SUBCUTANEOUS
Qty: 1.5 ML | Status: CANCELLED | OUTPATIENT
Start: 2023-05-23

## 2023-05-23 ASSESSMENT — PATIENT HEALTH QUESTIONNAIRE - PHQ9
10. IF YOU CHECKED OFF ANY PROBLEMS, HOW DIFFICULT HAVE THESE PROBLEMS MADE IT FOR YOU TO DO YOUR WORK, TAKE CARE OF THINGS AT HOME, OR GET ALONG WITH OTHER PEOPLE: NOT DIFFICULT AT ALL
SUM OF ALL RESPONSES TO PHQ QUESTIONS 1-9: 0
SUM OF ALL RESPONSES TO PHQ QUESTIONS 1-9: 0

## 2023-05-23 NOTE — PROGRESS NOTES
"Genesis is a 57 year old who is being evaluated via a billable telephone visit.      What phone number would you like to be contacted at? [phone]   How would you like to obtain your AVS? Raphael  Distant Location (provider location):  Off-site    Assessment & Plan     Class 3 severe obesity due to excess calories with serious comorbidity in adult, unspecified BMI (H)  Increase water, and Miralax. Keeping same dose with SA. Advise healthy exercise vs. Going up. Weight check in 2-3 months.     - Semaglutide, 1 MG/DOSE, (OZEMPIC, 1 MG/DOSE,) 4 MG/3ML pen; INJECT 1MG SUBCUTANEOUSLY  WEEKLY (EVERY 7 DAYS)             BMI:   Estimated body mass index is 40.49 kg/m  as calculated from the following:    Height as of 11/14/22: 1.655 m (5' 5.16\").    Weight as of 2/17/23: 110.9 kg (244 lb 8 oz).   Weight management plan: BMI reported weight is ~36. see above    MEDICATIONS:  Continue current medications without change    AMADOU Koenig CNP  [facility] BRENDAN Hurtado is a 57 year old, presenting for the following health issues:  Recheck Medication (Ozempic)        5/23/2023     8:03 AM   Additional Questions   Roomed by Aleksandr GONCALVES   Accompanied by Self         5/23/2023     8:03 AM   Patient Reported Additional Medications   Patient reports taking the following new medications None     History of Present Illness       Reason for visit:  Med Check- Ozempic    She eats 2-3 servings of fruits and vegetables daily.She consumes 1 sweetened beverage(s) daily.She exercises with enough effort to increase her heart rate 30 to 60 minutes per day.  She exercises with enough effort to increase her heart rate 5 days per week.   She is taking medications regularly.    Today's PHQ-9         PHQ-9 Total Score: 0    PHQ-9 Q9 Thoughts of better off dead/self-harm past 2 weeks :   Not at all    How difficult have these problems made it for you to do your work, take care of things at home, or get along with " other people: Not difficult at all       Weight- slow loss.   Current weight 225 LBS, thinks down over 40 LBS>   Is eating healthy.   Having constipation issues. On Miralax.   Exercise- not outside of work.             Review of Systems   Constitutional, HEENT, cardiovascular, pulmonary, gi and gu systems are negative, except as otherwise noted.      Objective           Vitals:  No vitals were obtained today due to virtual visit.    Physical Exam   healthy, alert and no distress  PSYCH: Alert and oriented times 3; coherent speech, normal   rate and volume, able to articulate logical thoughts, able   to abstract reason, no tangential thoughts, no hallucinations   or delusions  Her affect is normal and pleasant  RESP: No cough, no audible wheezing, able to talk in full sentences  Remainder of exam unable to be completed due to telephone visits                Phone call duration: 6 minutes

## 2023-07-18 ENCOUNTER — OFFICE VISIT (OUTPATIENT)
Dept: FAMILY MEDICINE | Facility: CLINIC | Age: 58
End: 2023-07-18
Payer: COMMERCIAL

## 2023-07-18 VITALS
RESPIRATION RATE: 10 BRPM | HEART RATE: 64 BPM | DIASTOLIC BLOOD PRESSURE: 68 MMHG | WEIGHT: 226.9 LBS | SYSTOLIC BLOOD PRESSURE: 124 MMHG | TEMPERATURE: 97 F | HEIGHT: 65 IN | OXYGEN SATURATION: 99 % | BODY MASS INDEX: 37.8 KG/M2

## 2023-07-18 DIAGNOSIS — F33.0 MAJOR DEPRESSIVE DISORDER, RECURRENT EPISODE, MILD (H): ICD-10-CM

## 2023-07-18 DIAGNOSIS — E66.01 CLASS 3 SEVERE OBESITY DUE TO EXCESS CALORIES WITH SERIOUS COMORBIDITY IN ADULT, UNSPECIFIED BMI (H): Primary | ICD-10-CM

## 2023-07-18 DIAGNOSIS — E66.813 CLASS 3 SEVERE OBESITY DUE TO EXCESS CALORIES WITH SERIOUS COMORBIDITY IN ADULT, UNSPECIFIED BMI (H): Primary | ICD-10-CM

## 2023-07-18 PROCEDURE — 99214 OFFICE O/P EST MOD 30 MIN: CPT | Performed by: NURSE PRACTITIONER

## 2023-07-18 RX ORDER — CITALOPRAM HYDROBROMIDE 20 MG/1
TABLET ORAL
Qty: 135 TABLET | Refills: 1 | Status: SHIPPED | OUTPATIENT
Start: 2023-07-18 | End: 2023-11-13

## 2023-07-18 ASSESSMENT — PAIN SCALES - GENERAL: PAINLEVEL: NO PAIN (0)

## 2023-07-18 NOTE — PROGRESS NOTES
Assessment & Plan     Major depressive disorder, recurrent episode, mild (H)  Controlled, updating refills.  - citalopram (CELEXA) 20 MG tablet; TAKE 1 AND 1/2 TABLETS (30 MG) BY MOUTH DAILY    Class 3 severe obesity due to excess calories with serious comorbidity in adult, unspecified BMI (H)  Weight has plateaued.  Discussed formal exercise and nutrition changes.  Advise cutting calories in the form of alcohol and lighter meals.  Patient will consider.  Increase dose.  Patient given 3-month supply.  If losing greater than 20 pounds advised follow-up prior to this.  Patient is scheduling physical exam.  Deferring labs today.  - Semaglutide, 2 MG/DOSE, (OZEMPIC) 8 MG/3ML pen; Inject 2 mg Subcutaneous every 7 days        Return to clinic with any new or worsening symptoms, and as needed.          AMADOU Koenig CNP [facility] Hurtado is a 58 year old, presenting for the following health issues:  Weight Check        7/18/2023     3:29 PM   Additional Questions   Roomed by Rajendra MTZ   Accompanied by n/a         7/18/2023     3:29 PM   Patient Reported Additional Medications   Patient reports taking the following new medications n/a     History of Present Illness       Reason for visit:  Ozempic    She eats 2-3 servings of fruits and vegetables daily.She consumes 2 sweetened beverage(s) daily.She exercises with enough effort to increase her heart rate 20 to 29 minutes per day.  She exercises with enough effort to increase her heart rate 3 or less days per week.   She is taking medications regularly.       Medication Followup of  Ozempic      Taking Medication as prescribed: yes- hoping to up the dose today.     Side Effects:  Constipation.     Medication Helping Symptoms:  yes    Having difficult time with formal exercise.  Is walking occasionally.  High stress job at the [employer] which is physically exertional and patient is generally tired after this.  Nutrition goes fair.  " She does have 1-2 beers daily, normal supper but eats rather light at work.    Weight is stable, patient was battling some constipation and is taking over-the-counter MiraLAX and drinking fruit for gut balance and this has been proven helpful.    Mood-stable needs refill on citalopram.    Not fasting today due for physical exam.    Denies side effects on Ozempic.  No issues with injections.  Patient would like increased dose.    Vitals:    07/18/23 1534   Weight: 102.9 kg (226 lb 14.4 oz)            Review of Systems   Constitutional, HEENT, cardiovascular, pulmonary, gi and gu systems are negative, except as otherwise noted.      Objective    /68   Pulse 64   Temp 97  F (36.1  C) (Temporal)   Resp 10   Ht 1.641 m (5' 4.61\")   Wt 102.9 kg (226 lb 14.4 oz)   LMP  (LMP Unknown)   SpO2 99%   BMI 38.22 kg/m    Body mass index is 38.22 kg/m .  Physical Exam   GENERAL healthy, alert and no distress  EYES sclera clear, PERRLA  HENT: nose,mouth without ulcers or lesions, Normal ear canals, TM's pearly grey, normal light reflex bilaterally   NECK: no adenopathy, no asymmetry, masses, or scars and thyroid normal to palpation  RESP: Clear to auscultation  CV: RRR, no murmur, no edema  NEURO: NEGATIVE, alert/oriented to person, location and time  PSYCH: normal pleasant affect                      "

## 2023-07-29 DIAGNOSIS — F33.0 MAJOR DEPRESSIVE DISORDER, RECURRENT EPISODE, MILD (H): ICD-10-CM

## 2023-07-31 DIAGNOSIS — F33.0 MAJOR DEPRESSIVE DISORDER, RECURRENT EPISODE, MILD (H): ICD-10-CM

## 2023-07-31 RX ORDER — CITALOPRAM HYDROBROMIDE 20 MG/1
TABLET ORAL
Qty: 135 TABLET | Refills: 1 | OUTPATIENT
Start: 2023-07-31

## 2023-09-24 DIAGNOSIS — E66.01 CLASS 3 SEVERE OBESITY DUE TO EXCESS CALORIES WITH SERIOUS COMORBIDITY IN ADULT, UNSPECIFIED BMI (H): ICD-10-CM

## 2023-09-24 DIAGNOSIS — E66.813 CLASS 3 SEVERE OBESITY DUE TO EXCESS CALORIES WITH SERIOUS COMORBIDITY IN ADULT, UNSPECIFIED BMI (H): ICD-10-CM

## 2023-09-26 ENCOUNTER — TELEPHONE (OUTPATIENT)
Dept: FAMILY MEDICINE | Facility: CLINIC | Age: 58
End: 2023-09-26

## 2023-09-26 RX ORDER — SEMAGLUTIDE 2.68 MG/ML
INJECTION, SOLUTION SUBCUTANEOUS
Qty: 3 ML | Refills: 0 | Status: SHIPPED | OUTPATIENT
Start: 2023-09-26 | End: 2023-09-26

## 2023-09-26 RX ORDER — SEMAGLUTIDE 2.68 MG/ML
INJECTION, SOLUTION SUBCUTANEOUS
Qty: 9 ML | Refills: 0 | Status: SHIPPED | OUTPATIENT
Start: 2023-09-26 | End: 2023-11-13 | Stop reason: ALTCHOICE

## 2023-09-26 NOTE — TELEPHONE ENCOUNTER
PRIOR AUTHORIZATION DENIED    Medication: SEMAGLUTIDE (2 MG/DOSE) 8 MG/3ML SC SOPN  Insurance Company: [employer] - Phone [phone] Fax [phone]  Denial Date: 9/26/2023  Denial Rational:     Appeal Information:     Patient Notified: No

## 2023-11-08 ENCOUNTER — MYC MEDICAL ADVICE (OUTPATIENT)
Dept: FAMILY MEDICINE | Facility: CLINIC | Age: 58
End: 2023-11-08
Payer: COMMERCIAL

## 2023-11-08 NOTE — TELEPHONE ENCOUNTER
Patient is scheduled too early for physical on 11/13 with Joana and is not due till 11/14; mychart sent.      Patricia Duran CMA ([facility])

## 2023-11-13 ENCOUNTER — OFFICE VISIT (OUTPATIENT)
Dept: FAMILY MEDICINE | Facility: CLINIC | Age: 58
End: 2023-11-13
Payer: COMMERCIAL

## 2023-11-13 VITALS
DIASTOLIC BLOOD PRESSURE: 64 MMHG | TEMPERATURE: 98.1 F | OXYGEN SATURATION: 96 % | HEART RATE: 71 BPM | SYSTOLIC BLOOD PRESSURE: 100 MMHG | HEIGHT: 65 IN | BODY MASS INDEX: 36.65 KG/M2 | WEIGHT: 220 LBS

## 2023-11-13 DIAGNOSIS — E66.01 CLASS 3 SEVERE OBESITY DUE TO EXCESS CALORIES WITH SERIOUS COMORBIDITY IN ADULT, UNSPECIFIED BMI (H): ICD-10-CM

## 2023-11-13 DIAGNOSIS — Z13.820 SCREENING FOR OSTEOPOROSIS: ICD-10-CM

## 2023-11-13 DIAGNOSIS — E66.813 CLASS 3 SEVERE OBESITY DUE TO EXCESS CALORIES WITH SERIOUS COMORBIDITY IN ADULT, UNSPECIFIED BMI (H): ICD-10-CM

## 2023-11-13 DIAGNOSIS — Z12.31 SCREENING MAMMOGRAM FOR BREAST CANCER: ICD-10-CM

## 2023-11-13 DIAGNOSIS — Z00.00 ROUTINE GENERAL MEDICAL EXAMINATION AT A HEALTH CARE FACILITY: Primary | ICD-10-CM

## 2023-11-13 DIAGNOSIS — F33.0 MAJOR DEPRESSIVE DISORDER, RECURRENT EPISODE, MILD (H): ICD-10-CM

## 2023-11-13 LAB
ALBUMIN SERPL BCG-MCNC: 4.3 G/DL (ref 3.5–5.2)
ALP SERPL-CCNC: 59 U/L (ref 35–104)
ALT SERPL W P-5'-P-CCNC: 22 U/L (ref 0–50)
ANION GAP SERPL CALCULATED.3IONS-SCNC: 12 MMOL/L (ref 7–15)
AST SERPL W P-5'-P-CCNC: 24 U/L (ref 0–45)
BASOPHILS # BLD AUTO: 0 10E3/UL (ref 0–0.2)
BASOPHILS NFR BLD AUTO: 1 %
BILIRUB SERPL-MCNC: 0.4 MG/DL
BUN SERPL-MCNC: 11.6 MG/DL (ref 8–23)
CALCIUM SERPL-MCNC: 9.5 MG/DL (ref 8.6–10)
CHLORIDE SERPL-SCNC: 107 MMOL/L (ref 98–107)
CHOLEST SERPL-MCNC: 227 MG/DL
CREAT SERPL-MCNC: 0.7 MG/DL (ref 0.51–0.95)
DEPRECATED HCO3 PLAS-SCNC: 24 MMOL/L (ref 22–29)
EGFRCR SERPLBLD CKD-EPI 2021: >90 ML/MIN/1.73M2
EOSINOPHIL # BLD AUTO: 0.1 10E3/UL (ref 0–0.7)
EOSINOPHIL NFR BLD AUTO: 3 %
ERYTHROCYTE [DISTWIDTH] IN BLOOD BY AUTOMATED COUNT: 12.3 % (ref 10–15)
GLUCOSE SERPL-MCNC: 94 MG/DL (ref 70–99)
HBA1C MFR BLD: 5.1 % (ref 0–5.6)
HCT VFR BLD AUTO: 41.6 % (ref 35–47)
HDLC SERPL-MCNC: 127 MG/DL
HGB BLD-MCNC: 13.8 G/DL (ref 11.7–15.7)
IMM GRANULOCYTES # BLD: 0 10E3/UL
IMM GRANULOCYTES NFR BLD: 0 %
LDLC SERPL CALC-MCNC: 92 MG/DL
LYMPHOCYTES # BLD AUTO: 1.5 10E3/UL (ref 0.8–5.3)
LYMPHOCYTES NFR BLD AUTO: 35 %
MCH RBC QN AUTO: 30.3 PG (ref 26.5–33)
MCHC RBC AUTO-ENTMCNC: 33.2 G/DL (ref 31.5–36.5)
MCV RBC AUTO: 91 FL (ref 78–100)
MONOCYTES # BLD AUTO: 0.3 10E3/UL (ref 0–1.3)
MONOCYTES NFR BLD AUTO: 8 %
NEUTROPHILS # BLD AUTO: 2.4 10E3/UL (ref 1.6–8.3)
NEUTROPHILS NFR BLD AUTO: 54 %
NONHDLC SERPL-MCNC: 100 MG/DL
PLATELET # BLD AUTO: 162 10E3/UL (ref 150–450)
POTASSIUM SERPL-SCNC: 4.4 MMOL/L (ref 3.4–5.3)
PROT SERPL-MCNC: 6.9 G/DL (ref 6.4–8.3)
RBC # BLD AUTO: 4.55 10E6/UL (ref 3.8–5.2)
SODIUM SERPL-SCNC: 143 MMOL/L (ref 135–145)
TRIGL SERPL-MCNC: 40 MG/DL
TSH SERPL DL<=0.005 MIU/L-ACNC: 1.24 UIU/ML (ref 0.3–4.2)
WBC # BLD AUTO: 4.4 10E3/UL (ref 4–11)

## 2023-11-13 PROCEDURE — 83036 HEMOGLOBIN GLYCOSYLATED A1C: CPT | Performed by: NURSE PRACTITIONER

## 2023-11-13 PROCEDURE — 84443 ASSAY THYROID STIM HORMONE: CPT | Performed by: NURSE PRACTITIONER

## 2023-11-13 PROCEDURE — 36415 COLL VENOUS BLD VENIPUNCTURE: CPT | Performed by: NURSE PRACTITIONER

## 2023-11-13 PROCEDURE — 85025 COMPLETE CBC W/AUTO DIFF WBC: CPT | Performed by: NURSE PRACTITIONER

## 2023-11-13 PROCEDURE — 99396 PREV VISIT EST AGE 40-64: CPT | Performed by: NURSE PRACTITIONER

## 2023-11-13 PROCEDURE — 99214 OFFICE O/P EST MOD 30 MIN: CPT | Mod: 25 | Performed by: NURSE PRACTITIONER

## 2023-11-13 PROCEDURE — 80061 LIPID PANEL: CPT | Performed by: NURSE PRACTITIONER

## 2023-11-13 PROCEDURE — 80053 COMPREHEN METABOLIC PANEL: CPT | Performed by: NURSE PRACTITIONER

## 2023-11-13 RX ORDER — CITALOPRAM HYDROBROMIDE 20 MG/1
TABLET ORAL
Qty: 135 TABLET | Refills: 1 | Status: SHIPPED | OUTPATIENT
Start: 2023-11-13 | End: 2023-11-16

## 2023-11-13 ASSESSMENT — ENCOUNTER SYMPTOMS
COUGH: 0
SORE THROAT: 0
PALPITATIONS: 0
HEADACHES: 0
MYALGIAS: 0
NAUSEA: 0
DYSURIA: 0
EYE PAIN: 0
CONSTIPATION: 1
HEMATURIA: 0
FREQUENCY: 0
CHILLS: 0
HEMATOCHEZIA: 0
PARESTHESIAS: 0
SHORTNESS OF BREATH: 0
HEARTBURN: 0
JOINT SWELLING: 0
FEVER: 0
ARTHRALGIAS: 0
NERVOUS/ANXIOUS: 0
BREAST MASS: 0
ABDOMINAL PAIN: 0
WEAKNESS: 0
DIARRHEA: 0
DIZZINESS: 0

## 2023-11-13 ASSESSMENT — ANXIETY QUESTIONNAIRES
GAD7 TOTAL SCORE: 3
7. FEELING AFRAID AS IF SOMETHING AWFUL MIGHT HAPPEN: NOT AT ALL
3. WORRYING TOO MUCH ABOUT DIFFERENT THINGS: SEVERAL DAYS
2. NOT BEING ABLE TO STOP OR CONTROL WORRYING: SEVERAL DAYS
GAD7 TOTAL SCORE: 3
IF YOU CHECKED OFF ANY PROBLEMS ON THIS QUESTIONNAIRE, HOW DIFFICULT HAVE THESE PROBLEMS MADE IT FOR YOU TO DO YOUR WORK, TAKE CARE OF THINGS AT HOME, OR GET ALONG WITH OTHER PEOPLE: NOT DIFFICULT AT ALL
5. BEING SO RESTLESS THAT IT IS HARD TO SIT STILL: NOT AT ALL
6. BECOMING EASILY ANNOYED OR IRRITABLE: SEVERAL DAYS
1. FEELING NERVOUS, ANXIOUS, OR ON EDGE: NOT AT ALL

## 2023-11-13 ASSESSMENT — PAIN SCALES - GENERAL: PAINLEVEL: NO PAIN (0)

## 2023-11-13 ASSESSMENT — PATIENT HEALTH QUESTIONNAIRE - PHQ9
SUM OF ALL RESPONSES TO PHQ QUESTIONS 1-9: 0
10. IF YOU CHECKED OFF ANY PROBLEMS, HOW DIFFICULT HAVE THESE PROBLEMS MADE IT FOR YOU TO DO YOUR WORK, TAKE CARE OF THINGS AT HOME, OR GET ALONG WITH OTHER PEOPLE: NOT DIFFICULT AT ALL
SUM OF ALL RESPONSES TO PHQ QUESTIONS 1-9: 0
5. POOR APPETITE OR OVEREATING: NOT AT ALL

## 2023-11-13 NOTE — PROGRESS NOTES
SUBJECTIVE:   CC: Genesis is an 58 year old who presents for preventive health visit.       2023     7:11 AM   Additional Questions   Roomed by Yas SUH CMA   Accompanied by self         2023     7:11 AM   Patient Reported Additional Medications   Patient reports taking the following new medications n/a       Healthy Habits:     Getting at least 3 servings of Calcium per day:  Yes    Bi-annual eye exam:  Yes    Dental care twice a year:  Yes    Sleep apnea or symptoms of sleep apnea:  None    Diet:  Regular (no restrictions)    Frequency of exercise:  2-3 days/week    Duration of exercise:  15-30 minutes    Taking medications regularly:  Yes    Medication side effects:  None    Additional concerns today:  Yes      Today's PHQ-9 Score:       2023     7:11 AM   PHQ-9 SCORE   PHQ-9 Total Score MyChart 0   PHQ-9 Total Score 0     Patient is on Ozempic 2 mg dosing.  She has had a 45 pound weight loss.  Recently her insurance has not been covering it because they do not cover it for prediabetes.  She would like to be switched over to Wegovy.  Alcohol use has been minimal, as the Ozempic has also decreased her use of this naturally.  Side effect of constipation, patient manages with MiraLAX.    Hypertension-stable updating labs today.  No side effects on losartan.  Blood pressure is lowering with weight management.  Patient would like to consider stopping medication at next evaluation.    Major depression, recurrent, stable.  Currently on 30 mg of citalopram doing well.            Social History     Tobacco Use    Smoking status: Former     Packs/day: 0.50     Years: 25.00     Additional pack years: 0.00     Total pack years: 12.50     Types: Cigarettes     Quit date: 2018     Years since quittin.7     Passive exposure: Never    Smokeless tobacco: Never    Tobacco comments:     quit 2018    Substance Use Topics    Alcohol use: Yes     Alcohol/week: 10.0 standard drinks of alcohol      Comment: 4-5 per week              11/13/2023     7:13 AM   Alcohol Use   Prescreen: >3 drinks/day or >7 drinks/week? No          No data to display              Reviewed orders with patient.  Reviewed health maintenance and updated orders accordingly - Yes  Lab work is in process    Breast Cancer Screening:    FHS-7:       8/3/2022    11:39 AM 11/14/2022    11:06 AM 11/13/2023     7:14 AM   Breast CA Risk Assessment (FHS-7)   Did any of your first-degree relatives have breast or ovarian cancer? No Yes Yes   Did any of your relatives have bilateral breast cancer? No Unknown Yes   Did any man in your family have breast cancer? No No No   Did any woman in your family have breast and ovarian cancer? No Yes Yes   Did any woman in your family have breast cancer before age 50 y? Yes Yes Yes   Do you have 2 or more relatives with breast and/or ovarian cancer? Yes Yes Yes   Do you have 2 or more relatives with breast and/or bowel cancer? Yes No Yes     Genetic testing not discussed today.  Mammogram Screening: Recommended annual mammography  Pertinent mammograms are reviewed under the imaging tab.    History of abnormal Pap smear: NO - age 30-65 PAP every 5 years with negative HPV co-testing recommended      Latest Ref Rng & Units 1/10/2022     3:43 PM 6/24/2016    10:00 AM 6/24/2016    12:00 AM   PAP / HPV   PAP  Negative for Intraepithelial Lesion or Malignancy (NILM)      PAP (Historical)    NIL    HPV 16 DNA Negative Negative  Negative     HPV 18 DNA Negative Negative  Negative     Other HR HPV Negative Negative  Negative       Reviewed and updated as needed this visit by clinical staff   Tobacco  Allergies  Meds              Reviewed and updated as needed this visit by Provider                     Review of Systems   Constitutional:  Negative for chills and fever.   HENT:  Negative for congestion, ear pain, hearing loss and sore throat.    Eyes:  Negative for pain and visual disturbance.   Respiratory:  Negative  "for cough and shortness of breath.    Cardiovascular:  Negative for chest pain, palpitations and peripheral edema.   Gastrointestinal:  Positive for constipation. Negative for abdominal pain, diarrhea, heartburn, hematochezia and nausea.   Breasts:  Negative for tenderness, breast mass and discharge.   Genitourinary:  Negative for dysuria, frequency, genital sores, hematuria, pelvic pain, urgency, vaginal bleeding and vaginal discharge.   Musculoskeletal:  Negative for arthralgias, joint swelling and myalgias.   Skin:  Negative for rash.   Neurological:  Negative for dizziness, weakness, headaches and paresthesias.   Psychiatric/Behavioral:  Negative for mood changes. The patient is not nervous/anxious.           OBJECTIVE:   /64   Pulse 71   Temp 98.1  F (36.7  C) (Temporal)   Ht 1.651 m (5' 5\")   Wt 99.8 kg (220 lb)   LMP  (LMP Unknown)   SpO2 96%   BMI 36.61 kg/m    Physical Exam  GENERAL: healthy, alert and no distress  EYES: Eyes grossly normal to inspection, PERRL and conjunctivae and sclerae normal  HENT: ear canals and TM's normal, nose and mouth without ulcers or lesions  NECK: no adenopathy, no asymmetry, masses, or scars and thyroid normal to palpation  RESP: lungs clear to auscultation - no rales, rhonchi or wheezes  BREAST: Patient deferred today.  CV: regular rate and rhythm, normal S1 S2, no S3 or S4, no murmur, click or rub, no peripheral edema and peripheral pulses strong  ABDOMEN: soft, nontender, no hepatosplenomegaly, no masses and bowel sounds normal  MS: no gross musculoskeletal defects noted, no edema  SKIN: no suspicious lesions or rashes  NEURO: Normal strength and tone, mentation intact and speech normal  PSYCH: mentation appears normal, affect normal/bright    Diagnostic Test Results:  Labs reviewed in Epic  Results for orders placed or performed in visit on 11/13/23 (from the past 24 hour(s))   CBC with Platelets & Differential    Narrative    The following orders were " created for panel order CBC with Platelets & Differential.  Procedure                               Abnormality         Status                     ---------                               -----------         ------                     CBC with platelets and d...[[id number]]                      Final result                 Please view results for these tests on the individual orders.   Hemoglobin A1c   Result Value Ref Range    Hemoglobin A1C 5.1 0.0 - 5.6 %   CBC with platelets and differential   Result Value Ref Range    WBC Count 4.4 4.0 - 11.0 10e3/uL    RBC Count 4.55 3.80 - 5.20 10e6/uL    Hemoglobin 13.8 11.7 - 15.7 g/dL    Hematocrit 41.6 35.0 - 47.0 %    MCV 91 78 - 100 fL    MCH 30.3 26.5 - 33.0 pg    MCHC 33.2 31.5 - 36.5 g/dL    RDW 12.3 10.0 - 15.0 %    Platelet Count 162 150 - 450 10e3/uL    % Neutrophils 54 %    % Lymphocytes 35 %    % Monocytes 8 %    % Eosinophils 3 %    % Basophils 1 %    % Immature Granulocytes 0 %    Absolute Neutrophils 2.4 1.6 - 8.3 10e3/uL    Absolute Lymphocytes 1.5 0.8 - 5.3 10e3/uL    Absolute Monocytes 0.3 0.0 - 1.3 10e3/uL    Absolute Eosinophils 0.1 0.0 - 0.7 10e3/uL    Absolute Basophils 0.0 0.0 - 0.2 10e3/uL    Absolute Immature Granulocytes 0.0 <=0.4 10e3/uL       ASSESSMENT/PLAN:       ICD-10-CM    1. Routine general medical examination at a health care facility  Z00.00 CBC with Platelets & Differential     Lipid panel reflex to direct LDL Fasting     Hemoglobin A1c     TSH with free T4 reflex     Comprehensive metabolic panel     CBC with Platelets & Differential     Lipid panel reflex to direct LDL Fasting     Hemoglobin A1c     TSH with free T4 reflex     Comprehensive metabolic panel      2. Class 3 severe obesity due to excess calories with serious comorbidity in adult, unspecified BMI (H)  E66.01 Semaglutide-Weight Management (WEGOVY) 1 MG/0.5ML pen     Comprehensive metabolic panel     Comprehensive metabolic panel      3. Major depressive disorder, recurrent  "episode, mild (H24)  F33.0 citalopram (CELEXA) 20 MG tablet      4. Screening for osteoporosis  Z13.820 DX Hip/Pelvis/Spine      5. Screening mammogram for breast cancer  Z12.31 *MA Screening Digital Bilateral          Patient has been advised of split billing requirements and indicates understanding: Yes      COUNSELING:  Reviewed preventive health counseling, as reflected in patient instructions       Regular exercise       Healthy diet/nutrition       Immunizations  Declined: Vaccines due to work schedule.   Colorectal Cancer Screening       Advise mammogram and screening for bone density    Obesity-ordering midrange dose of Wegovy.  Patient would like to see if she continues with weight management and loss with moderate dosing.  Discussed use, risk, side effects.  Continue constipation cares.  Recheck in 2 months with virtual visit    Depression-diagnosis as above.  Stable.  Medications refilled.  Virtual visit in 6 months.    Updating labs today.    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      BMI:   Estimated body mass index is 36.61 kg/m  as calculated from the following:    Height as of this encounter: 1.651 m (5' 5\").    Weight as of this encounter: 99.8 kg (220 lb).   Weight management plan: Discussed healthy diet and exercise guidelines as above.      She reports that she quit smoking about 5 years ago. Her smoking use included cigarettes. She has a 12.50 pack-year smoking history. She has never been exposed to tobacco smoke. She has never used smokeless tobacco.          AMADOU Koenig CNP  [facility]  "

## 2023-11-13 NOTE — RESULT ENCOUNTER NOTE
Genesis,  I have reviewed your labs, and they are all normal. If you have questions, please notify me through MyChart or a telephone call.   Joana Taylor DNP    Please mail results- pt. Having MyChart issues

## 2023-11-13 NOTE — RESULT ENCOUNTER NOTE
Genesis,  Your labs that have returned are normal. More labs are still processing. Joana Taylor, DNP

## 2023-11-16 ENCOUNTER — TELEPHONE (OUTPATIENT)
Dept: FAMILY MEDICINE | Facility: CLINIC | Age: 58
End: 2023-11-16
Payer: COMMERCIAL

## 2023-11-16 DIAGNOSIS — E66.813 CLASS 3 SEVERE OBESITY DUE TO EXCESS CALORIES WITH SERIOUS COMORBIDITY IN ADULT, UNSPECIFIED BMI (H): ICD-10-CM

## 2023-11-16 DIAGNOSIS — E66.01 CLASS 3 SEVERE OBESITY DUE TO EXCESS CALORIES WITH SERIOUS COMORBIDITY IN ADULT, UNSPECIFIED BMI (H): ICD-10-CM

## 2023-11-16 DIAGNOSIS — F33.0 MAJOR DEPRESSIVE DISORDER, RECURRENT EPISODE, MILD (H): ICD-10-CM

## 2023-11-16 RX ORDER — CITALOPRAM HYDROBROMIDE 20 MG/1
TABLET ORAL
Qty: 135 TABLET | Refills: 1 | Status: SHIPPED | OUTPATIENT
Start: 2023-11-16 | End: 2024-05-14

## 2023-11-16 NOTE — TELEPHONE ENCOUNTER
----- Message from Arabella Zhao sent at 11/16/2023  7:06 AM CST -----      ----- Message -----  From: Arabella Zhao  Sent: 11/16/2023   6:53 AM CST  To: [facility] Pool      ----- Message -----  From: Joana Taylor APRN CNP  Sent: 11/13/2023   4:20 PM CST  To: [facility] Patient Care Team    Genesis,  I have reviewed your labs, and they are all normal. If you have questions, please notify me through MyChart or a telephone call.   Joana Taylor DNP    Please mail results- pt. Having MyChart issues

## 2023-11-16 NOTE — TELEPHONE ENCOUNTER
Called patient and informed her of the below from provider. RN will mail results to patient due to MyChart issues. Patient also requesting Celexa and Wegovy be cancelled at [facility] in [place] and sent to [facility].     Patient verbalized understanding and all questions answered.     Prescriptions resent to [facility], RN discontinued medications at [facility] in [place] and called to confirm cancellation.    ROSE Salinas, RN  [facility] ~ Registered Nurse  Clinic Triage ~ [place] [facility] [place]  November 16, 2023

## 2023-11-16 NOTE — LETTER
"November 16, 2023      Genesis Adhikari  [address] [place] [address]        Dear ,    We are writing to inform you of your test results.    Your test results fall within the expected range(s) or remain unchanged from previous results.  Please continue with current treatment plan.    \"Genesis,  I have reviewed your labs, and they are all normal. If you have questions, please notify me through MyChart or a telephone call.  Joana Taylor, YE\"    Resulted Orders   Lipid panel reflex to direct LDL Fasting   Result Value Ref Range    Cholesterol 227 (H) <200 mg/dL    Triglycerides 40 <150 mg/dL    Direct Measure  >=50 mg/dL    LDL Cholesterol Calculated 92 <=100 mg/dL    Non HDL Cholesterol 100 <130 mg/dL    Narrative    Cholesterol  Desirable:  <200 mg/dL    Triglycerides  Normal:  Less than 150 mg/dL  Borderline High:  150-199 mg/dL  High:  200-499 mg/dL  Very High:  Greater than or equal to 500 mg/dL    Direct Measure HDL  Female:  Greater than or equal to 50 mg/dL   Male:  Greater than or equal to 40 mg/dL    LDL Cholesterol  Desirable:  <100mg/dL  Above Desirable:  100-129 mg/dL   Borderline High:  130-159 mg/dL   High:  160-189 mg/dL   Very High:  >= 190 mg/dL    Non HDL Cholesterol  Desirable:  130 mg/dL  Above Desirable:  130-159 mg/dL  Borderline High:  160-189 mg/dL  High:  190-219 mg/dL  Very High:  Greater than or equal to 220 mg/dL   Hemoglobin A1c   Result Value Ref Range    Hemoglobin A1C 5.1 0.0 - 5.6 %      Comment:      Normal <5.7%   Prediabetes 5.7-6.4%    Diabetes 6.5% or higher     Note: Adopted from ADA consensus guidelines.   TSH with free T4 reflex   Result Value Ref Range    TSH 1.24 0.30 - 4.20 uIU/mL   Comprehensive metabolic panel   Result Value Ref Range    Sodium 143 135 - 145 mmol/L      Comment:      Reference intervals for this test were updated on 09/26/2023 to more accurately reflect our healthy population. There may be differences in the flagging of prior " results with similar values performed with this method. Interpretation of those prior results can be made in the context of the updated reference intervals.     Potassium 4.4 3.4 - 5.3 mmol/L    Carbon Dioxide (CO2) 24 22 - 29 mmol/L    Anion Gap 12 7 - 15 mmol/L    Urea Nitrogen 11.6 8.0 - 23.0 mg/dL    Creatinine 0.70 0.51 - 0.95 mg/dL    GFR Estimate >90 >60 mL/min/1.73m2    Calcium 9.5 8.6 - 10.0 mg/dL    Chloride 107 98 - 107 mmol/L    Glucose 94 70 - 99 mg/dL    Alkaline Phosphatase 59 35 - 104 U/L    AST 24 0 - 45 U/L      Comment:      Reference intervals for this test were updated on 6/12/2023 to more accurately reflect our healthy population. There may be differences in the flagging of prior results with similar values performed with this method. Interpretation of those prior results can be made in the context of the updated reference intervals.    ALT 22 0 - 50 U/L      Comment:      Reference intervals for this test were updated on 6/12/2023 to more accurately reflect our healthy population. There may be differences in the flagging of prior results with similar values performed with this method. Interpretation of those prior results can be made in the context of the updated reference intervals.      Protein Total 6.9 6.4 - 8.3 g/dL    Albumin 4.3 3.5 - 5.2 g/dL    Bilirubin Total 0.4 <=1.2 mg/dL   CBC with platelets and differential   Result Value Ref Range    WBC Count 4.4 4.0 - 11.0 10e3/uL    RBC Count 4.55 3.80 - 5.20 10e6/uL    Hemoglobin 13.8 11.7 - 15.7 g/dL    Hematocrit 41.6 35.0 - 47.0 %    MCV 91 78 - 100 fL    MCH 30.3 26.5 - 33.0 pg    MCHC 33.2 31.5 - 36.5 g/dL    RDW 12.3 10.0 - 15.0 %    Platelet Count 162 150 - 450 10e3/uL    % Neutrophils 54 %    % Lymphocytes 35 %    % Monocytes 8 %    % Eosinophils 3 %    % Basophils 1 %    % Immature Granulocytes 0 %    Absolute Neutrophils 2.4 1.6 - 8.3 10e3/uL    Absolute Lymphocytes 1.5 0.8 - 5.3 10e3/uL    Absolute Monocytes 0.3 0.0 - 1.3 10e3/uL     Absolute Eosinophils 0.1 0.0 - 0.7 10e3/uL    Absolute Basophils 0.0 0.0 - 0.2 10e3/uL    Absolute Immature Granulocytes 0.0 <=0.4 10e3/uL       If you have any questions or concerns, please call the clinic at the number listed above.       Sincerely,  Your [facility] Team

## 2023-12-11 ENCOUNTER — TELEPHONE (OUTPATIENT)
Dept: FAMILY MEDICINE | Facility: CLINIC | Age: 58
End: 2023-12-11
Payer: COMMERCIAL

## 2023-12-11 DIAGNOSIS — E66.01 CLASS 3 SEVERE OBESITY DUE TO EXCESS CALORIES WITH SERIOUS COMORBIDITY IN ADULT, UNSPECIFIED BMI (H): ICD-10-CM

## 2023-12-11 DIAGNOSIS — E66.813 CLASS 3 SEVERE OBESITY DUE TO EXCESS CALORIES WITH SERIOUS COMORBIDITY IN ADULT, UNSPECIFIED BMI (H): ICD-10-CM

## 2023-12-11 NOTE — TELEPHONE ENCOUNTER
Increased dose.  Needs med check in 1 month on new dose- telephone visit is needed.   AMADOU Koenig CNP

## 2023-12-11 NOTE — TELEPHONE ENCOUNTER
Patient calling on her prescription of her Wegovy as she has no refills and will need med filled.    Patient questioned if there is a dose change and if not please place refills on prescription. Silva Duran LPN

## 2023-12-21 ENCOUNTER — VIRTUAL VISIT (OUTPATIENT)
Dept: FAMILY MEDICINE | Facility: CLINIC | Age: 58
End: 2023-12-21
Payer: COMMERCIAL

## 2023-12-21 ENCOUNTER — LAB (OUTPATIENT)
Dept: LAB | Facility: CLINIC | Age: 58
End: 2023-12-21
Payer: COMMERCIAL

## 2023-12-21 ENCOUNTER — TELEPHONE (OUTPATIENT)
Dept: FAMILY MEDICINE | Facility: CLINIC | Age: 58
End: 2023-12-21

## 2023-12-21 DIAGNOSIS — R30.0 DYSURIA: ICD-10-CM

## 2023-12-21 DIAGNOSIS — R35.0 URINARY FREQUENCY: ICD-10-CM

## 2023-12-21 DIAGNOSIS — R30.0 DYSURIA: Primary | ICD-10-CM

## 2023-12-21 DIAGNOSIS — B34.9 VIRAL SYNDROME: ICD-10-CM

## 2023-12-21 LAB
ALBUMIN UR-MCNC: >=300 MG/DL
APPEARANCE UR: ABNORMAL
BACTERIA #/AREA URNS HPF: ABNORMAL /HPF
BILIRUB UR QL STRIP: ABNORMAL
COLOR UR AUTO: YELLOW
GLUCOSE UR STRIP-MCNC: NEGATIVE MG/DL
HGB UR QL STRIP: ABNORMAL
KETONES UR STRIP-MCNC: 15 MG/DL
LEUKOCYTE ESTERASE UR QL STRIP: ABNORMAL
NITRATE UR QL: POSITIVE
PH UR STRIP: 6 [PH] (ref 5–7)
RBC #/AREA URNS AUTO: ABNORMAL /HPF
SP GR UR STRIP: >=1.03 (ref 1–1.03)
SQUAMOUS #/AREA URNS AUTO: ABNORMAL /LPF
UROBILINOGEN UR STRIP-ACNC: 1 E.U./DL
WBC #/AREA URNS AUTO: ABNORMAL /HPF

## 2023-12-21 PROCEDURE — 99213 OFFICE O/P EST LOW 20 MIN: CPT | Mod: VID | Performed by: FAMILY MEDICINE

## 2023-12-21 PROCEDURE — 81001 URINALYSIS AUTO W/SCOPE: CPT

## 2023-12-21 PROCEDURE — 87086 URINE CULTURE/COLONY COUNT: CPT

## 2023-12-21 RX ORDER — NITROFURANTOIN 25; 75 MG/1; MG/1
100 CAPSULE ORAL 2 TIMES DAILY
Qty: 14 CAPSULE | Refills: 0 | Status: SHIPPED | OUTPATIENT
Start: 2023-12-21 | End: 2023-12-28

## 2023-12-21 NOTE — TELEPHONE ENCOUNTER
----- Message from Diane Nunez MA sent at 12/21/2023  1:17 PM CST -----    ----- Message -----  From: Raghu Pacheco MD  Sent: 12/21/2023   1:16 PM CST  To: [facility] Care Team Pool    Please call. Urine shows an infection. I prescribed medicine. The culture will be back in two days, there is a possibility we may need to change her medicine but we will call to let her know that. If no call means to stay on the same medicine. Please follow up with PCP to check on this in couple weeks.

## 2023-12-21 NOTE — TELEPHONE ENCOUNTER
Writer called patient and reviewed message per Dr. Pacheco along with to which pharmacy Macrobid was sent.    Patient verbalized understanding and in agreement with plan.  UGO MontgomeryN, RN-[facility]

## 2023-12-21 NOTE — LETTER
December 21, 2023      Genesis Adhikari  [address] [place] [address]        To Whom It May Concern:    Genesis MAJANO Adhikari was seen on today.  Please excuse her from work on 12/18/23 to 12/21/23 due to illness.        Sincerely,        Raghu Pacheco MD

## 2023-12-21 NOTE — PROGRESS NOTES
Genesis is a 58 year old who is being evaluated via a billable video visit.      How would you like to obtain your AVS? MyChart  If the video visit is dropped, the invitation should be resent by: Text to cell phone: [phone]  Will anyone else be joining your video visit? No          Assessment & Plan     Dysuria  - UA with Microscopic - lab collect; Future  - Urine Culture Aerobic Bacterial - lab collect; Future  Urinary frequency  - UA with Microscopic - lab collect; Future  - Urine Culture Aerobic Bacterial - lab collect; Future  Viral syndrome  EHR reviewed.   Past medical history, problem list, past surgical history, family history, social history, medications reviewed, updated, reconciled.   Discussed the benefit of obtaining a urinalysis and culture before treatment. She will present to her clinic for this. Orders were placed. Will follow up on results. Discussed supportive care.   Regarding her flu like symptoms, discussed the etiology is unclear though could certainly be influenza, covid 19 given the community burden. Could consider evaluation of this if she is not improving. Letter provided for her absence at work this week. She says she will try to go to work tomorrow. If unable she may need more documentation of for her absence.             Raghu Pacheco MD  [facility]   Genesis is a 58 year old, presenting for the following health issues:  Urinary Pain (Burning, frequency, started yesterday)      12/21/2023    10:34 AM   Additional Questions   Roomed by Tia       History of Present Illness       Reason for visit:  Chills, sweats, headache.  Symptom onset:  3-7 days ago  Symptoms include:  Also now uti.  Symptom intensity:  Moderate  Symptom progression:  Staying the same  Had these symptoms before:  Yes  Has tried/received treatment for these symptoms:  No  What makes it worse:  Getting out of bed.  What makes it better:  Sleeping.    She eats 2-3 servings of  fruits and vegetables daily.She consumes 2 sweetened beverage(s) daily.She exercises with enough effort to increase her heart rate 20 to 29 minutes per day.  She exercises with enough effort to increase her heart rate 3 or less days per week.   She is taking medications regularly.         [age] year old female with history of obesity, hypertension, depression complains of burning with urination, frequency since yesterday. Is having fevers on and off but she attributes this to some sort of flu. She has been sick with that for four or five days. She does not think this is covid 19. She notes her daughter has the same thing. She has tried to go to work, has had to leave and wondering how long her symptoms will last. Needs a note for her absence.   No nausea or vomiting.   No blood in the urine.   She has some abnormal odor in the urine. She notes history of a lot of UTIs, but none lately. Is trying to use cranberry juice.               Objective           Vitals:  No vitals were obtained today due to virtual visit.    Physical Exam   GENERAL: Healthy, alert and no distress  EYES: Eyes grossly normal to inspection.  No discharge or erythema, or obvious scleral/conjunctival abnormalities.  RESP: No audible wheeze, cough, or visible cyanosis.  No visible retractions or increased work of breathing.    SKIN: Visible skin clear. No significant rash, abnormal pigmentation or lesions.  NEURO: Cranial nerves grossly intact.  Mentation and speech appropriate for age.  PSYCH: Mentation appears normal, affect normal/bright, judgement and insight intact, normal speech and appearance well-groomed.    Video-Visit Details    Type of service:  Video Visit     Originating Location (pt. Location): Home    Distant Location (provider location):  On-site  Platform used for Video Visit: BRAND-YOURSELFWell

## 2023-12-22 LAB — BACTERIA UR CULT: NORMAL

## 2024-01-02 ENCOUNTER — ALLIED HEALTH/NURSE VISIT (OUTPATIENT)
Dept: FAMILY MEDICINE | Facility: CLINIC | Age: 59
End: 2024-01-02
Payer: COMMERCIAL

## 2024-01-02 VITALS — SYSTOLIC BLOOD PRESSURE: 124 MMHG | DIASTOLIC BLOOD PRESSURE: 70 MMHG

## 2024-01-02 DIAGNOSIS — I10 PRIMARY HYPERTENSION: Primary | ICD-10-CM

## 2024-01-02 PROCEDURE — 99207 PR NO CHARGE NURSE ONLY: CPT

## 2024-01-02 NOTE — PROGRESS NOTES
Chief Complaint   Patient presents with    Allied Health Visit     Genesis Adhikari is a 58 year old patient who comes in today for a Blood Pressure check.  Initial BP:  /70   LMP  (LMP Unknown)      Data Unavailable  Disposition: follow-up as previously indicated by provider    Patricia Duran CMA (AAMA)

## 2024-01-09 ENCOUNTER — VIRTUAL VISIT (OUTPATIENT)
Dept: FAMILY MEDICINE | Facility: CLINIC | Age: 59
End: 2024-01-09
Payer: COMMERCIAL

## 2024-01-09 DIAGNOSIS — E66.01 MORBID OBESITY (H): Primary | ICD-10-CM

## 2024-01-09 DIAGNOSIS — I10 PRIMARY HYPERTENSION: ICD-10-CM

## 2024-01-09 PROCEDURE — 99213 OFFICE O/P EST LOW 20 MIN: CPT | Mod: 95 | Performed by: NURSE PRACTITIONER

## 2024-01-09 RX ORDER — PREDNISONE 10 MG/1
10 TABLET ORAL DAILY
COMMUNITY
Start: 2023-11-13 | End: 2024-05-14

## 2024-01-09 ASSESSMENT — ANXIETY QUESTIONNAIRES
1. FEELING NERVOUS, ANXIOUS, OR ON EDGE: NOT AT ALL
7. FEELING AFRAID AS IF SOMETHING AWFUL MIGHT HAPPEN: NOT AT ALL
2. NOT BEING ABLE TO STOP OR CONTROL WORRYING: NOT AT ALL
4. TROUBLE RELAXING: NOT AT ALL
6. BECOMING EASILY ANNOYED OR IRRITABLE: NOT AT ALL
7. FEELING AFRAID AS IF SOMETHING AWFUL MIGHT HAPPEN: NOT AT ALL
3. WORRYING TOO MUCH ABOUT DIFFERENT THINGS: NOT AT ALL
IF YOU CHECKED OFF ANY PROBLEMS ON THIS QUESTIONNAIRE, HOW DIFFICULT HAVE THESE PROBLEMS MADE IT FOR YOU TO DO YOUR WORK, TAKE CARE OF THINGS AT HOME, OR GET ALONG WITH OTHER PEOPLE: NOT DIFFICULT AT ALL
GAD7 TOTAL SCORE: 0
GAD7 TOTAL SCORE: 0
5. BEING SO RESTLESS THAT IT IS HARD TO SIT STILL: NOT AT ALL
8. IF YOU CHECKED OFF ANY PROBLEMS, HOW DIFFICULT HAVE THESE MADE IT FOR YOU TO DO YOUR WORK, TAKE CARE OF THINGS AT HOME, OR GET ALONG WITH OTHER PEOPLE?: NOT DIFFICULT AT ALL

## 2024-01-09 NOTE — PROGRESS NOTES
"    Instructions Relayed to Patient by Virtual Roomer:     Patient is active on [employer]:   Relayed following to patient: \"It looks like you are active on [employer], are you able to join the visit this way? If not, do you need us to send you a link now or would you like your provider to send a link via text or email when they are ready to initiate the visit?\"    Reminded patient to ensure they were logged on to virtual visit by arrival time listed. Documented in appointment notes if patient had flexibility to initiate visit sooner than arrival time. If pediatric virtual visit, ensured pediatric patient along with parent/guardian will be present for video visit.     Patient offered the website [email or web address] and/or phone number to [employer] Help line: [phone]      Genesis is a 58 year old who is being evaluated via a billable video visit.      How would you like to obtain your AVS? Mail a copy  If the video visit is dropped, the invitation should be resent by: Text to cell phone: [phone]  Will anyone else be joining your video visit? No          Assessment & Plan     Morbid obesity (H)  Weight is plateauing.    Patient still busy with work.  Discussed improved eating.  Walking program outside work onset as slowed on her job.  Increasing dose.  Recheck in 2 months.  - Semaglutide-Weight Management (WEGOVY) 2.4 MG/0.75ML pen; Inject 2.4 mg Subcutaneous once a week    Primary hypertension  -Controlled with weight loss.  No current medication management.  Continue to monitor with upcoming appointments.  Will likely need to restart medication if weight increases.  Discussed exercise and continued weight loss.             Virtual visit 2 months.    AMADOU Koenig CNP  [facility] CARDONACHADD Hurtado is a 58 year old, presenting for the following health issues:  Hypertension (And Medication Follow Up)        1/9/2024     6:38 AM   Additional Questions   Roomed by Patricia" Previous Labs: No C   Accompanied by Self       History of Present Illness       Hypertension: She presents for follow up of hypertension.  She does not check blood pressure  regularly outside of the clinic. Outpatient blood pressures have not been over 140/90. She does not follow a low salt diet.     She eats 4 or more servings of fruits and vegetables daily.She consumes 2 sweetened beverage(s) daily.She exercises with enough effort to increase her heart rate 30 to 60 minutes per day.  She exercises with enough effort to increase her heart rate 3 or less days per week.   She is taking medications regularly.     Lower appetite in PM- light dinner,   No cravings for alcohol.   Not losing.   Not able to exercise- working 6 days at [employer].   Eating healthy- leftovers for breakfast.   Occ. Snack.   No lunch. - rolls breakfast and lunch into one meal and eats a snack.         Hypertension Follow-up  PT also here to discuss Medications  Patient stopped all blood pressure medications.  Denies chest pain, shortness of breath, headaches.  Patient is fatigued because of her long workdays.  Do you check your blood pressure regularly outside of the clinic? No   Are you following a low salt diet? No  Are your blood pressures ever more than 140 on the top number (systolic) OR more   than 90 on the bottom number (diastolic), for example 140/90? No        Review of Systems   Constitutional, HEENT, cardiovascular, pulmonary, gi and gu systems are negative, except as otherwise noted.      Objective           Vitals:  No vitals were obtained today due to virtual visit.    Physical Exam   GENERAL: Healthy, alert and no distress  EYES: Eyes grossly normal to inspection.  No discharge or erythema, or obvious scleral/conjunctival abnormalities.  RESP: No audible wheeze, cough, or visible cyanosis.  No visible retractions or increased work of breathing.    SKIN: Visible skin clear. No significant rash, abnormal pigmentation or lesions.  NEURO: Cranial  How Did The Hair Loss Occur?: gradual in onset How Severe Is Your Hair Loss?: mild nerves grossly intact.  Mentation and speech appropriate for age.  PSYCH: Mentation appears normal, affect normal/bright, judgement and insight intact, normal speech and appearance well-groomed.                Video-Visit Details    Type of service:  Video Visit     Originating Location (pt. Location): Home    Distant Location (provider location):  Off-site  Platform used for Video Visit: Irvin       What Hair Products Do You Use?: Suave Shampoo

## 2024-01-31 ENCOUNTER — ANCILLARY PROCEDURE (OUTPATIENT)
Dept: BONE DENSITY | Facility: CLINIC | Age: 59
End: 2024-01-31
Attending: NURSE PRACTITIONER
Payer: COMMERCIAL

## 2024-01-31 ENCOUNTER — ANCILLARY PROCEDURE (OUTPATIENT)
Dept: MAMMOGRAPHY | Facility: CLINIC | Age: 59
End: 2024-01-31
Attending: NURSE PRACTITIONER
Payer: COMMERCIAL

## 2024-01-31 DIAGNOSIS — Z13.820 SCREENING FOR OSTEOPOROSIS: ICD-10-CM

## 2024-01-31 DIAGNOSIS — Z12.31 SCREENING MAMMOGRAM FOR BREAST CANCER: ICD-10-CM

## 2024-01-31 PROCEDURE — 77067 SCR MAMMO BI INCL CAD: CPT | Mod: GC | Performed by: RADIOLOGY

## 2024-01-31 PROCEDURE — 77080 DXA BONE DENSITY AXIAL: CPT | Performed by: RADIOLOGY

## 2024-02-01 PROBLEM — M85.89 OSTEOPENIA OF MULTIPLE SITES: Status: ACTIVE | Noted: 2024-02-01

## 2024-02-01 NOTE — RESULT ENCOUNTER NOTE
Edvin Hurtado, your bone density has worsened since 2016.  You are not showing signs of osteopenia.  This is an intermediate category that is in between normal and osteoporosis.  People with osteopenia should work on taking in 2203-0681 mg of calcium with vitamin D daily. They should also be getting daily weight bearing exercise (walking works).  We should recheck this again in 2 years time.  Please reach out with questions.  Sincerely,   Joana Taylor, DNP

## 2024-02-01 NOTE — RESULT ENCOUNTER NOTE
Edvin Hurtado, your bone density has worsened since 2016.  You are not showing signs of osteopenia.  This is an intermediate category that is in between normal and osteoporosis.  People with osteopenia should work on taking in 6265-9486 mg of calcium with vitamin D daily. They should also be getting daily weight bearing exercise (walking works).  We should recheck this again in 2 years time.  Please reach out with questions.  Sincerely,   Joana Taylor, DNP

## 2024-02-06 ENCOUNTER — TELEPHONE (OUTPATIENT)
Dept: FAMILY MEDICINE | Facility: CLINIC | Age: 59
End: 2024-02-06
Payer: COMMERCIAL

## 2024-02-06 NOTE — TELEPHONE ENCOUNTER
----- Message from Arabella Zhao sent at 2/6/2024  9:14 AM CST -----    ----- Message -----  From: Joana Taylor APRN CNP  Sent: 2/6/2024   8:44 AM CST  To: [facility] Pool    Please notify patient of results/message. Sincerely, AMADOU Koenig CNP

## 2024-02-06 NOTE — TELEPHONE ENCOUNTER
Edvin Hurtado, your bone density has worsened since 2016.  You are not showing signs of osteopenia.  This is an intermediate category that is in between normal and osteoporosis.  People with osteopenia should work on taking in 2501-9669 mg of calcium with vitamin D daily. They should also be getting daily weight bearing exercise (walking works).  We should recheck this again in 2 years time.  Please reach out with questions.  Sincerely,  Joana Taylor, DNP

## 2024-02-06 NOTE — TELEPHONE ENCOUNTER
RN Triage    Patient Contact    Attempt # 1    Was call answered?  No.  Left message on voicemail with information to call me back.    Upon patient callback please advise of the below from provider:     Edvin Hurtado, your bone density has worsened since 2016.  You are not showing signs of osteopenia.  This is an intermediate category that is in between normal and osteoporosis.  People with osteopenia should work on taking in 5344-1777 mg of calcium with vitamin D daily. They should also be getting daily weight bearing exercise (walking works).  We should recheck this again in 2 years time.  Please reach out with questions.  Sincerely,  YE Aquino BSN, RN  [facility] ~ Registered Nurse  Clinic Triage ~ [place] [facility]  February 6, 2024

## 2024-02-07 NOTE — TELEPHONE ENCOUNTER
Rn called and spoke with patient and advised of the below from provider.     Patient verbalized understanding and all questions answered.     UGO SalinasN, RN  [facility] ~ Registered Nurse  Clinic Triage ~ [place] [facility]  February 7, 2024

## 2024-02-19 NOTE — PROGRESS NOTES
"  SUBJECTIVE:                                                    Genesis Adhikari is a 51 year old female who presents to clinic today for the following health issues:    ABDOMINAL PAIN     Onset: 10 days     Description:   Character: Dull ache  Location: right lower quad  Radiation: None    Intensity: mild    Progression of Symptoms:  same    Accompanying Signs & Symptoms:  Fever/Chills?: not now but did  Gas/Bloating: no   Nausea: no   Vomitting: no   Diarrhea?: no   Constipation:no   Dysuria or Hematuria: no    History:   Trauma: no   Previous similar pain: no    Previous tests done: none    Precipitating factors:   Does the pain change with:     Food: no      BM: no     Urination: no     Alleviating factors:  None    Therapies Tried and outcome: None    LMP:  not applicable       Patient reports she has been experiencing intermittent dull pain in RLQ for the past 10 days. She notes she experiences the pain approximately 10 times a day, and there are no specific triggers. She denies bloating, vaginal symptoms, changes in bowel movement or urination and fevers. She notes she is unsure when her last period was as she had an endometrial ablation in 2011. Patient reports family history of stomach cancer in father, and breast cancer in grandmother.    Patient states she would like tobacco cessation help, and wants to start Chantix. She adds she has taking Chantix before, and quit for seven years. She notes the last time she tried was 1.5 years ago, and she quit for one year.     Patient's mother states patient lashes out frequently. She relates one moment patient is angry, and then she calms down and is friendly and helpful. She notes she gives her daughter consequences, and this motivates her daughter to do her chores. She states she is getting different facts from the different therapists, so is unsure what to do. She notes she worries that her daughter is still \"sexting\" older guys, but has no way to figure it out " as she does not have a code to her phone. Patient states her daughter blames and expresses anger at her her for getting a 22 year old hossein put into alf.     Problem list and histories reviewed & adjusted, as indicated.  Additional history: as documented    Patient Active Problem List   Diagnosis     Vaginitis and vulvovaginitis     Depressive disorder, not elsewhere classified     Other, mixed, or unspecified nondependent drug abuse, in remission     CARDIOVASCULAR SCREENING; LDL GOAL LESS THAN 160     Major depressive disorder, recurrent episode, mild (H)     Obesity     H/O laparoscopic adjustable gastric banding     Seborrheic dermatitis of scalp     Alcohol use     Past Surgical History   Procedure Laterality Date     C ligate fallopian tube,postpartum  2002     Lap band       Hysteroscopy  3/21/11     As ablation, endometrial, thermal, w/o hysteroscopic guidance  3/21/11     uterine ablation       Social History   Substance Use Topics     Smoking status: Current Every Day Smoker -- 0.50 packs/day for 25 years     Types: Cigarettes     Smokeless tobacco: Never Used     Alcohol Use: 6.0 oz/week      Comment: 4 beers daily     Family History   Problem Relation Age of Onset     CANCER Father      liver and stomach     HEART DISEASE Maternal Grandfather       of heart problems     CANCER Maternal Grandmother      breast cancer     DIABETES No family hx of      Hypertension No family hx of      Hyperlipidemia No family hx of      Colon Cancer No family hx of      Anxiety Disorder No family hx of      Substance Abuse No family hx of      Asthma No family hx of      Genetic Disorder No family hx of      OSTEOPOROSIS No family hx of      Anesthesia Reaction No family hx of      MENTAL ILLNESS No family hx of      Depression No family hx of      Other Cancer No family hx of      Prostate Cancer No family hx of      Breast Cancer No family hx of      CEREBROVASCULAR DISEASE No family hx of      Coronary Artery  "Disease No family hx of          Current Outpatient Prescriptions   Medication Sig Dispense Refill     citalopram (CELEXA) 20 MG tablet Take 1 tablet (20 mg) by mouth daily 90 tablet 3     ketoconazole (NIZORAL) 2 % cream Apply topically daily Use twice daily on the external ear on non steroid days 30 g 11     triamcinolone (KENALOG) 0.1 % cream For external ears use twice daily for 2 weeks, then twice daily weekends only. 45 g 1     fluocinonide (LIDEX) 0.05 % ointment Apply twice daily for 2 weeks behind the ears. 30 g 0     clobetasol propionate 0.05 % SHAM Apply topically daily as needed Apply daily in the shower for 1 week, then once weekly as needed.  Leave in place for 15 minutes then add water, lather and rinse thoroughly. 1 Bottle 5     Problem list, Medication list, Allergies, and Medical/Social/Surgical histories [facility] and updated as appropriate.    ROS:  Constitutional, HEENT, cardiovascular, pulmonary, gi and gu systems are negative, except as otherwise noted.    OBJECTIVE:                                                    /72 mmHg  Pulse 72  Temp(Src) 97.7  F (36.5  C) (Temporal)  Resp 16  Ht 1.651 m (5' 5\")  Wt 92.625 kg (204 lb 3.2 oz)  BMI 33.98 kg/m2  Body mass index is 33.98 kg/(m^2).  GENERAL APPEARANCE: healthy, alert and no distress  HENT: ear canals and TM's normal and nose and mouth without ulcers or lesions  NECK: no adenopathy, no asymmetry, masses, or scars and thyroid normal to palpation  RESP: lungs clear to auscultation - no rales, rhonchi or wheezes  CV: regular rates and rhythm, normal S1 S2, no S3 or S4 and no murmur, click or rub  ABDOMEN: soft, nontender, without hepatosplenomegaly or masses and bowel sounds normal  NEURO: Normal strength and tone, mentation intact and speech normal  PSYCH: mentation appears normal and affect normal/bright    Diagnostic test results:  No results found for this or any previous visit (from the past 24 hour(s)). "     ASSESSMENT/PLAN:                                                        ICD-10-CM    1. Tobacco use disorder F17.200 TOBACCO CESSATION - FOR HEALTH MAINTENANCE     varenicline (CHANTIX STARTING MONTH KYLER) 0.5 MG X 11 & 1 MG X 42 tablet   2. RLQ abdominal pain R10.31        Discussed abdominal pain. Order placed for pelvic US.     Discussed tobacco cessation, has been fairly successful with Chantix in past. . Encouraged patient to never smoke again. Order placed for varenicline. Medication direction, dosage, and side effects discussed with patient.    Discussed issues with daughter at length.     Follow up with Provider - with results, and poss. Gyn eval.      AMADOU Koenig [facility]    This document serves as a record of the services and decisions personally performed and made by Joana Taylor DNP. It was created on her behalf by Chelo Smith, a trained medical scribe. The creation of this document is based on the provider's statements to the medical scribe.  Chelo Smith 11:32 AM January 23, 2017     Patient Specific Counseling (Will Not Stick From Patient To Patient): Advised to use telfa and paper tape to keep covered. Detail Level: Detailed Patient Specific Counseling (Will Not Stick From Patient To Patient): Schedule for removal.

## 2024-03-16 DIAGNOSIS — E66.01 MORBID OBESITY (H): ICD-10-CM

## 2024-03-16 NOTE — LETTER
March 21, 2024      Genesis Adhikari  [address] [place] [address]                Edvin Hurtado,     We just wanted to let you know that we sent in a refill for your medication but you are due for a office visit before your next refill is due.      Please give us a call at [phone] or use [employer] to schedule.      Have a great day.     Your [facility] Care Team                     Refilled medications sent for:     Semaglutide-Weight Management (WEGOVY) 2.4 MG/0.75ML pen

## 2024-03-18 RX ORDER — SEMAGLUTIDE 2.4 MG/.75ML
INJECTION, SOLUTION SUBCUTANEOUS
Qty: 3 ML | Refills: 0 | Status: SHIPPED | OUTPATIENT
Start: 2024-03-18 | End: 2024-04-08

## 2024-04-08 ENCOUNTER — VIRTUAL VISIT (OUTPATIENT)
Dept: FAMILY MEDICINE | Facility: CLINIC | Age: 59
End: 2024-04-08
Payer: COMMERCIAL

## 2024-04-08 DIAGNOSIS — F33.0 MAJOR DEPRESSIVE DISORDER, RECURRENT EPISODE, MILD (H): Primary | ICD-10-CM

## 2024-04-08 DIAGNOSIS — E66.01 MORBID OBESITY (H): ICD-10-CM

## 2024-04-08 PROCEDURE — 99214 OFFICE O/P EST MOD 30 MIN: CPT | Mod: 95 | Performed by: NURSE PRACTITIONER

## 2024-04-08 RX ORDER — SEMAGLUTIDE 2.4 MG/.75ML
INJECTION, SOLUTION SUBCUTANEOUS
Qty: 3 ML | Refills: 0 | Status: SHIPPED | OUTPATIENT
Start: 2024-04-08 | End: 2024-04-30

## 2024-04-08 RX ORDER — BUPROPION HYDROCHLORIDE 150 MG/1
150 TABLET ORAL EVERY MORNING
Qty: 90 TABLET | Refills: 0 | Status: SHIPPED | OUTPATIENT
Start: 2024-04-08

## 2024-04-08 RX ORDER — CITALOPRAM HYDROBROMIDE 40 MG/1
40 TABLET ORAL DAILY
Qty: 90 TABLET | Refills: 0 | Status: SHIPPED | OUTPATIENT
Start: 2024-04-08 | End: 2024-07-16

## 2024-04-08 ASSESSMENT — PATIENT HEALTH QUESTIONNAIRE - PHQ9
SUM OF ALL RESPONSES TO PHQ QUESTIONS 1-9: 14
SUM OF ALL RESPONSES TO PHQ QUESTIONS 1-9: 14

## 2024-04-08 NOTE — PROGRESS NOTES
"    Instructions Relayed to Patient by Virtual Roomer:     Patient is active on [employer]:   Relayed following to patient: \"It looks like you are active on [employer], are you able to join the visit this way? If not, do you need us to send you a link now or would you like your provider to send a link via text or email when they are ready to initiate the visit?\"    Reminded patient to ensure they were logged on to virtual visit by arrival time listed. Documented in appointment notes if patient had flexibility to initiate visit sooner than arrival time. If pediatric virtual visit, ensured pediatric patient along with parent/guardian will be present for video visit.     Patient offered the website [email or web address] and/or phone number to [employer] Help line: [phone]    Genesis is a 58 year old who is being evaluated via a billable video visit.    How would you like to obtain your AVS? Eye[employer]ix  If the video visit is dropped, the invitation should be resent by: Text to cell phone: [phone]  Will anyone else be joining your video visit? No      Assessment & Plan       ICD-10-CM    1. Major depressive disorder, recurrent episode, mild (H24)  F33.0 citalopram (CELEXA) 40 MG tablet     buPROPion (WELLBUTRIN XL) 150 MG 24 hr tablet      2. Morbid obesity (H)  E66.01 Semaglutide-Weight Management (WEGOVY) 2.4 MG/0.75ML pen            Increased dose of citalopram.  Adding Wellbutrin to her regimen.  Patient having comorbid anxiety likely.  Discussed healthy habits.  Medication use, risk, and anticipated side effects discussed.    recheck in 1 to 2 months.    Patient has plateaued a bit on her obesity.  She recently was on vacation.,  Weight actually stable at [place] stable.  Advise switching site to thigh administration to see if will absorb better.  Continue healthy habits and exercise, eating healthy.  Recheck as above.      Return to clinic with any new or worsening symptoms, and as needed. "                 Cesia Hurtado is a 58 year old, presenting for the following health issues:  Depression        4/8/2024     7:40 AM   Additional Questions   Roomed by Mandy GONCALVES   Accompanied by self     History of Present Illness       Mental Health Follow-up:  Patient presents to follow-up on Depression.Patient's depression since last visit has been:  Worse  The patient is having other symptoms associated with depression.      Any significant life events: grief or loss and other  Patient is not feeling anxious or having panic attacks.  Patient has no concerns about alcohol or drug use.    She eats 2-3 servings of fruits and vegetables daily.She consumes 0 sweetened beverage(s) daily.She exercises with enough effort to increase her heart rate 10 to 19 minutes per day.  She exercises with enough effort to increase her heart rate 3 or less days per week.   She is taking medications regularly.   Was crying on cruise.     Daughter has to move home.   Crying all the time.     Friend at work.     Sleeping- lots of thinking at night.     No SI.       Weight loss-was on a cruise and weight stable to be stable.  She thinks maybe gained a pound or 2.  Has not felt she has lost any weight in the last 3 weeks.  Discussed her dentist currently.  Denies adverse reactions to the Wegovy.            Review of Systems  Constitutional, HEENT, cardiovascular, pulmonary, gi and gu systems are negative, except as otherwise noted.      Objective           Vitals:  No vitals were obtained today due to virtual visit.    Physical Exam   GENERAL: alert and no distress  EYES: Eyes grossly normal to inspection.  No discharge or erythema, or obvious scleral/conjunctival abnormalities.  RESP: No audible wheeze, cough, or visible cyanosis.    SKIN: Visible skin clear. No significant rash, abnormal pigmentation or lesions.  NEURO: Cranial nerves grossly intact.  Mentation and speech appropriate for age.  PSYCH: mentation appears normal,  anxious, speech pressured, judgement and insight intact, and appearance well groomed          Video-Visit Details    Type of service:  Video Visit   Originating Location (pt. Location): Home    Distant Location (provider location):  On-site  Platform used for Video Visit: Irvin  Signed Electronically by: AMADOU Koenig CNP

## 2024-04-09 ENCOUNTER — TELEPHONE (OUTPATIENT)
Dept: FAMILY MEDICINE | Facility: CLINIC | Age: 59
End: 2024-04-09
Payer: COMMERCIAL

## 2024-04-09 NOTE — LETTER
April 11, 2024      Genesis Adhikari  [address] [place] [address]            Genesis,     Please give us a call or use [employer] to schedule a mood and weight check in 6 weeks. This can be in person or virtual.     Sincerely,    AMADOU Koenig CNP

## 2024-04-09 NOTE — TELEPHONE ENCOUNTER
Left message for patient to return call. When call is returned, please see message below and assist in scheduling.

## 2024-04-30 ENCOUNTER — TELEPHONE (OUTPATIENT)
Dept: FAMILY MEDICINE | Facility: CLINIC | Age: 59
End: 2024-04-30
Payer: COMMERCIAL

## 2024-04-30 DIAGNOSIS — E66.01 MORBID OBESITY (H): ICD-10-CM

## 2024-04-30 RX ORDER — SEMAGLUTIDE 2.4 MG/.75ML
INJECTION, SOLUTION SUBCUTANEOUS
Qty: 3 ML | Refills: 1 | Status: SHIPPED | OUTPATIENT
Start: 2024-04-30

## 2024-04-30 NOTE — TELEPHONE ENCOUNTER
Resend Rx to mail pharmacy per patient request; medication pending and pharmacy entered.    Patricia Duran CMA ([facility])

## 2024-04-30 NOTE — TELEPHONE ENCOUNTER
[facility] Call Center    Phone Message    May a detailed message be left on voicemail: yes     Reason for Call: Medication Refill Request    Has the patient contacted the pharmacy for the refill? Yes     Name of medication being requested: Semaglutide-Weight Management (WEGOVY) 2.4 MG/0.75ML pen    Provider who prescribed the medication: Joana Taylor APRN CNP     Pharmacy: [facility] [place] - [facility] AT Portal to [facility], [facility], [place], [place], [address]    Date medication is needed: ASAP  - PER PATIENT:  This medication was sent over to [facility] in [place], [place] I need this medication [facility] if you can change it to them. Please call me to let me know once the change has been made . Thank you so much.-per patient.     Action Taken: Other: [facility] POOL    Travel Screening: Not Applicable

## 2024-05-01 NOTE — TELEPHONE ENCOUNTER
PRIOR AUTHORIZATION DENIED    Medication: SEMAGLUTIDE-WEIGHT MANAGEMENT 2.4 MG/0.75ML SC SOAJ  Insurance Company: [employer] - Phone [phone] Fax [phone]  Denial Date: 4/30/2024  Denial Reason(s):     Appeal Information:     Patient Notified: No

## 2024-05-14 ENCOUNTER — VIRTUAL VISIT (OUTPATIENT)
Dept: FAMILY MEDICINE | Facility: CLINIC | Age: 59
End: 2024-05-14
Payer: COMMERCIAL

## 2024-05-14 DIAGNOSIS — F33.0 MAJOR DEPRESSIVE DISORDER, RECURRENT EPISODE, MILD (H): ICD-10-CM

## 2024-05-14 DIAGNOSIS — E66.01 MORBID OBESITY (H): Primary | ICD-10-CM

## 2024-05-14 PROCEDURE — 99214 OFFICE O/P EST MOD 30 MIN: CPT | Mod: 95 | Performed by: NURSE PRACTITIONER

## 2024-05-14 ASSESSMENT — ANXIETY QUESTIONNAIRES
8. IF YOU CHECKED OFF ANY PROBLEMS, HOW DIFFICULT HAVE THESE MADE IT FOR YOU TO DO YOUR WORK, TAKE CARE OF THINGS AT HOME, OR GET ALONG WITH OTHER PEOPLE?: NOT DIFFICULT AT ALL
3. WORRYING TOO MUCH ABOUT DIFFERENT THINGS: SEVERAL DAYS
GAD7 TOTAL SCORE: 2
7. FEELING AFRAID AS IF SOMETHING AWFUL MIGHT HAPPEN: NOT AT ALL
5. BEING SO RESTLESS THAT IT IS HARD TO SIT STILL: NOT AT ALL
6. BECOMING EASILY ANNOYED OR IRRITABLE: NOT AT ALL
GAD7 TOTAL SCORE: 2
1. FEELING NERVOUS, ANXIOUS, OR ON EDGE: NOT AT ALL
GAD7 TOTAL SCORE: 2
2. NOT BEING ABLE TO STOP OR CONTROL WORRYING: SEVERAL DAYS
IF YOU CHECKED OFF ANY PROBLEMS ON THIS QUESTIONNAIRE, HOW DIFFICULT HAVE THESE PROBLEMS MADE IT FOR YOU TO DO YOUR WORK, TAKE CARE OF THINGS AT HOME, OR GET ALONG WITH OTHER PEOPLE: NOT DIFFICULT AT ALL
4. TROUBLE RELAXING: NOT AT ALL

## 2024-05-14 ASSESSMENT — PATIENT HEALTH QUESTIONNAIRE - PHQ9
SUM OF ALL RESPONSES TO PHQ QUESTIONS 1-9: 5
SUM OF ALL RESPONSES TO PHQ QUESTIONS 1-9: 5
10. IF YOU CHECKED OFF ANY PROBLEMS, HOW DIFFICULT HAVE THESE PROBLEMS MADE IT FOR YOU TO DO YOUR WORK, TAKE CARE OF THINGS AT HOME, OR GET ALONG WITH OTHER PEOPLE: NOT DIFFICULT AT ALL

## 2024-05-14 NOTE — PROGRESS NOTES
"    Instructions Relayed to Patient by Virtual Roomer:     If patient is not active on [employer]:  Relayed the following to patient: \"Would you like us to text or email you a link to join your appointment now or when your provider is ready to initiate the virtual visit?\"      Patient Confirmed they will join visit via: Text Link to Cell Phone  Reminded patient to ensure they were logged on to virtual visit by arrival time listed.   Asked if patient has flexibility to initiate visit sooner than arrival time: patient is unable to initiate visit earlier than arrival time     If pediatric virtual visit, ensured pediatric patient along with parent/guardian will be present for video visit.     Patient offered the website [email or web address] and/or phone number to [employer] Help line: [phone]    Genesis is a 58 year old who is being evaluated via a billable video visit.    How would you like to obtain your AVS? Mail a copy  If the video visit is dropped, the invitation should be resent by: Text to cell phone: [phone]  Will anyone else be joining your video visit? No      Assessment & Plan     Morbid obesity (H)  Stable,nto losing, not curbing appetite, pt. Feels failed regimen. Wants to re-try Ozempic. RX sent.   Message weight loss in 1 month on new dose. - same side affects apply.   Work with medication, less calories/alcohol advised.     - Semaglutide, 2 MG/DOSE, (OZEMPIC) 8 MG/3ML pen; Inject 2 mg Subcutaneous every 7 days    Major depressive disorder, recurrent episode, mild (H24)  Improved. Pt. Wants to do trial off Wellbutrin, and will start this, but remain on higher dose of Celexa.   Mood re-check 6 months.     Return to clinic with any new or worsening symptoms, and as needed.             BMI  Estimated body mass index is 36.61 kg/m  as calculated from the following:    Height as of 11/13/23: 1.651 m (5' 5\").    Weight as of 11/13/23: 99.8 kg (220 lb).   Weight management plan: Discussed " healthy diet and exercise guidelines          Cesia Hurtado is a 58 year old, presenting for the following health issues:  Weight Check and MH Follow Up        5/14/2024     6:42 AM   Additional Questions   Roomed by Aleksandr GONCALVES     History of Present Illness       Mental Health Follow-up:  Patient presents to follow-up on Depression & Anxiety.Patient's depression since last visit has been:  Better  The patient is not having other symptoms associated with depression.  Patient's anxiety since last visit has been:  Better  The patient is not having other symptoms associated with anxiety.  Any significant life events: No  Patient is not feeling anxious or having panic attacks.  Patient has no concerns about alcohol or drug use.        Medication Followup of Semaglutide-Weight Management (WEGOVY) 2.4 MG/0.75ML pen   Taking Medication as prescribed: yes  Side Effects:  Wegovy and Ozempic mess up her stomach so she takes a lot of miralax  Medication Helping Symptoms:  NO-Ozempic worked way better for her. Wegovy has done nothing for her  218 LBS.   Feels gaining weight.   Wegovy does not curb alcohol cravings.   Ozempic worked and want to do this.   Is walking more.     Calorie intake- feels she is snacking more.   Drinking more on the weekends.   Feels failed high dose Wegovy.       Feels mood is stabilized well.   - wants to stop Wellbutrin when it runs out.               Review of Systems  Constitutional, HEENT, cardiovascular, pulmonary, gi and gu systems are negative, except as otherwise noted.      Objective           Vitals:  No vitals were obtained today due to virtual visit.    Physical Exam   GENERAL: alert and no distress  EYES: Eyes grossly normal to inspection.  No discharge or erythema, or obvious scleral/conjunctival abnormalities.  RESP: No audible wheeze, cough, or visible cyanosis.    SKIN: Visible skin clear. No significant rash, abnormal pigmentation or lesions.  NEURO: Cranial nerves grossly  intact.  Mentation and speech appropriate for age.  PSYCH: Appropriate affect, tone, and pace of words          Video-Visit Details    Type of service:  Video Visit   Originating Location (pt. Location): Home    Distant Location (provider location):  Off-site  Platform used for Video Visit: Irvin  Signed Electronically by: AMADOU Koenig CNP

## 2024-05-15 ENCOUNTER — TELEPHONE (OUTPATIENT)
Dept: FAMILY MEDICINE | Facility: CLINIC | Age: 59
End: 2024-05-15
Payer: COMMERCIAL

## 2024-05-15 NOTE — LETTER
Katherine 3, 2024      Genesis Adhikari  [address] [place] [address]            Jonaa Camara wanted me to let you know that your prior authorization was denied. She recommended to stay on Wegovy since Ozempic is not covered.      If you have any questions or concerns, send a message via [employer] or call [phone]     Have a great day!     SMITH Obrien

## 2024-05-15 NOTE — TELEPHONE ENCOUNTER
Semaglutide, 2 MG/DOSE, (OZEMPIC) 8 MG/3ML pen     Prior Authorization Retail Medication Request    Medication/Dose: Semaglutide, 2 MG/DOSE, (OZEMPIC) 8 MG/3ML pen  Diagnosis and ICD code (if different than what is on RX):    New/renewal/insurance change PA/secondary ins. PA:  Previously Tried and Failed:    Rationale:      Insurance   Primary:   Insurance ID:      Secondary (if applicable):  Insurance ID:      Pharmacy Information (if different than what is on RX)  Name:    Phone:    Fax:    Key: [id number]

## 2024-05-16 NOTE — TELEPHONE ENCOUNTER
Prior Authorization Not Needed per Insurance    Medication: SEMAGLUTIDE-WEIGHT MANAGEMENT 2.4 MG/0.75ML SC SOAJ  Insurance Company: [employer] - Phone [phone] Fax [phone]  Expected CoPay: $    Pharmacy Filling the Rx:  [facility] - [place] - [facility] AT PORTAL TO REGISTERED [facility] SITES [[id number]]   Pharmacy Notified: faxed pa not needed to pharmacy  Patient Notified: informed pharmacy to contact patient

## 2024-05-28 NOTE — TELEPHONE ENCOUNTER
Central Prior Authorization Team   Phone: [phone]    PA Initiation    Medication: Semaglutide, 2 MG/DOSE, (OZEMPIC) 8 MG/3ML pen  Insurance Company: [employer] Non-Specialty PA's - Phone [phone] Fax [phone]  [facility] the Rx[facility] [place] - [facility] AT PORTAL TO REGISTERED [facility] SITES  Filling Pharmacy Phone: [phone]  Filling Pharmacy Fax:    Start Date: 5/28/2024

## 2024-05-28 NOTE — TELEPHONE ENCOUNTER
PRIOR AUTHORIZATION DENIED    Medication: Semaglutide, 2 MG/DOSE, (OZEMPIC) 8 MG/3ML pen    Denial Date: 5/28/2024    Denial Rational:  Medication is not approved for diagnosis - it is only approved for use in patients with type 2 diabetes.          Appeal Information:    If you would like to appeal, please supply P/A team with a letter of medical necessity with clinical reason.

## 2024-07-16 DIAGNOSIS — F33.0 MAJOR DEPRESSIVE DISORDER, RECURRENT EPISODE, MILD (H): ICD-10-CM

## 2024-07-16 RX ORDER — CITALOPRAM HYDROBROMIDE 40 MG/1
40 TABLET ORAL DAILY
Qty: 90 TABLET | Refills: 0 | Status: SHIPPED | OUTPATIENT
Start: 2024-07-16

## 2024-09-10 ENCOUNTER — VIRTUAL VISIT (OUTPATIENT)
Dept: FAMILY MEDICINE | Facility: CLINIC | Age: 59
End: 2024-09-10
Payer: COMMERCIAL

## 2024-09-10 ENCOUNTER — TELEPHONE (OUTPATIENT)
Dept: FAMILY MEDICINE | Facility: CLINIC | Age: 59
End: 2024-09-10

## 2024-09-10 DIAGNOSIS — E66.01 MORBID OBESITY (H): Primary | ICD-10-CM

## 2024-09-10 DIAGNOSIS — R73.03 PREDIABETES: ICD-10-CM

## 2024-09-10 PROCEDURE — 99441 PR PHYSICIAN TELEPHONE EVALUATION 5-10 MIN: CPT | Mod: 93 | Performed by: NURSE PRACTITIONER

## 2024-09-10 ASSESSMENT — PATIENT HEALTH QUESTIONNAIRE - PHQ9: SUM OF ALL RESPONSES TO PHQ QUESTIONS 1-9: 0

## 2024-09-10 NOTE — TELEPHONE ENCOUNTER
Call pt. Ask her to come in for height and weight and forward to insurance company.   AMADOU Koenig CNP

## 2024-09-10 NOTE — LETTER
September 10, 2024      Genesis MAJANO Onofre  [address] [place] [address]        To Whom It May Concern,     Please allow Genesis to wear athletic supportive footwear during her work shifts, due to foot pain.      Sincerely,    Electronically signed.    AMADOU Koenig CNP

## 2024-09-10 NOTE — TELEPHONE ENCOUNTER
PRIOR AUTHORIZATION DENIED    Medication: TIRZEPATIDE-WEIGHT MANAGEMENT 5 MG/0.5ML SC SOAJ  Insurance Company: [employer] - Phone [phone] Fax [phone]  Denial Date: 9/10/2024  Denial Reason(s):     Appeal Information:     Patient Notified: No

## 2024-09-11 NOTE — TELEPHONE ENCOUNTER
----- Message from Maria Eugenia Talavera sent at 6/10/2019  3:30 PM CDT -----  Contact: Self  Arcelia Alonzo  MRN: [id number]  : 1944  PCP: Vargas Nathan  Home Phone      [phone]  Work Phone      Not on file.  Mobile          [phone]    MESSAGE:   Would like to speak to nurse regarding a paper that was sent over to [employer].  There was a problem with this and she would like to speak to nurse. Please call.    Phone: [phone]   Sent [facility] with scheduling ticket.    Patricia Duran CMA ([facility])

## 2024-09-12 NOTE — TELEPHONE ENCOUNTER
Spoke with patient and she is scheduled for float visit.    Patricia Duran CMA ([facility])

## 2024-09-16 ENCOUNTER — ALLIED HEALTH/NURSE VISIT (OUTPATIENT)
Dept: FAMILY MEDICINE | Facility: CLINIC | Age: 59
End: 2024-09-16
Payer: COMMERCIAL

## 2024-09-16 VITALS — BODY MASS INDEX: 39.7 KG/M2 | HEIGHT: 65 IN | WEIGHT: 238.3 LBS

## 2024-09-16 DIAGNOSIS — E66.813 CLASS 3 SEVERE OBESITY DUE TO EXCESS CALORIES WITH SERIOUS COMORBIDITY IN ADULT, UNSPECIFIED BMI (H): Primary | ICD-10-CM

## 2024-09-16 DIAGNOSIS — E66.01 CLASS 3 SEVERE OBESITY DUE TO EXCESS CALORIES WITH SERIOUS COMORBIDITY IN ADULT, UNSPECIFIED BMI (H): Primary | ICD-10-CM

## 2024-09-30 ENCOUNTER — TELEPHONE (OUTPATIENT)
Dept: FAMILY MEDICINE | Facility: CLINIC | Age: 59
End: 2024-09-30
Payer: COMMERCIAL

## 2024-09-30 NOTE — TELEPHONE ENCOUNTER
Prior Authorization Retail Medication Request    Medication/Dose:   Diagnosis and ICD code (if different than what is on RX):  tirzepatide-Weight Management (ZEPBOUND) 5 MG/0.5ML prefilled pen   New/renewal/insurance change PA/secondary ins. PA:  Previously Tried and Failed:    Rationale:      Insurance   Primary: [employer]  Insurance ID:  [id number]     Secondary (if applicable):  Insurance ID:      Pharmacy Information (if different than what is on RX)  Name:    Phone:    Fax:    Clinic Information  Preferred routing pool for dept communication: [facility] Pool    This is the second request. Patient came in an did height/weight and would like to get this rx now.

## 2024-10-02 NOTE — TELEPHONE ENCOUNTER
Prior Authorization Approval    Medication: ZEPBOUND 5 MG/0.5ML SC SOAJ  Authorization Effective Date: 10/2/2024  Authorization Expiration Date: 6/2/2025  Insurance Company: [employer] - Phone [phone] Fax [phone]  Which [facility] the [id number] [place] [facility] [place] [facility] [address] [address] [place] [address]  Pharmacy Notified: YES  Patient Notified: YES (faxed approval info to pharmacy and notified patient via [employer] message)      Approved    Prior authorization approved  Payer: BRISA Marrero Case ID: [id number]    [phone]    [phone]  Note from payer: Your PA request has been approved.  Additional information will be provided in the approval communication. (Message 1149)  Approval Details    Authorized from October 2, 2024 to June 2, 2025  Electronic appeal: Not supported

## 2024-10-02 NOTE — TELEPHONE ENCOUNTER
Retail Pharmacy Prior Authorization Team   Phone: [phone]    PA Initiation    Medication: ZEPBOUND 5 MG/0.5ML SC SOAJ  Insurance Company: [employer] - Phone [phone] Fax [phone]  [id number] [place] [facility] [place] [place] - [address] [address] [place] [address]  Filling Pharmacy Phone: [phone]  Filling Pharmacy Fax:    Start Date: 10/2/2024    COMPLETED CRITERIA QUESTIONS VIA EPA - PENDING DETERMINATION

## 2024-10-14 DIAGNOSIS — F33.0 MAJOR DEPRESSIVE DISORDER, RECURRENT EPISODE, MILD (H): ICD-10-CM

## 2024-10-14 RX ORDER — CITALOPRAM HYDROBROMIDE 40 MG/1
40 TABLET ORAL DAILY
Qty: 90 TABLET | Refills: 1 | Status: SHIPPED | OUTPATIENT
Start: 2024-10-14

## 2024-10-21 ENCOUNTER — VIRTUAL VISIT (OUTPATIENT)
Dept: FAMILY MEDICINE | Facility: OTHER | Age: 59
End: 2024-10-21
Payer: COMMERCIAL

## 2024-10-21 DIAGNOSIS — F33.0 MAJOR DEPRESSIVE DISORDER, RECURRENT EPISODE, MILD (H): Primary | ICD-10-CM

## 2024-10-21 PROCEDURE — 99441 PR PHYSICIAN TELEPHONE EVALUATION 5-10 MIN: CPT | Mod: 93 | Performed by: NURSE PRACTITIONER

## 2024-10-21 RX ORDER — BUPROPION HYDROCHLORIDE 150 MG/1
150 TABLET ORAL EVERY MORNING
Qty: 90 TABLET | Refills: 0 | Status: SHIPPED | OUTPATIENT
Start: 2024-10-21

## 2024-10-21 ASSESSMENT — PATIENT HEALTH QUESTIONNAIRE - PHQ9
SUM OF ALL RESPONSES TO PHQ QUESTIONS 1-9: 5
SUM OF ALL RESPONSES TO PHQ QUESTIONS 1-9: 5
10. IF YOU CHECKED OFF ANY PROBLEMS, HOW DIFFICULT HAVE THESE PROBLEMS MADE IT FOR YOU TO DO YOUR WORK, TAKE CARE OF THINGS AT HOME, OR GET ALONG WITH OTHER PEOPLE: SOMEWHAT DIFFICULT

## 2024-10-21 NOTE — Clinical Note
DEAN Bernard,   I had a telephone visit with patient today. She was quite tearful. She notes that she does not feel she should have stopped her wellbutrin back in May and wanted to have a visit to restart this. After discussing this with her more she asked to have reduced hours at work. I advised her that I did not feel comfortable doing this since I am just meeting her for the first time. I advised her that I would reach out to you and see your thoughts. She wants to follow up with you but could not get in for 2 weeks. Feel free to reach out to me if you have questions as well.    AMADOU Toledo CNP Questions or concerns please feel free to send me a Xtone message or call me Phone : [phone]

## 2024-10-21 NOTE — PROGRESS NOTES
Genesis is a 59 year old who is being evaluated via a billable telephone visit.    What phone number would you like to be contacted at? [phone]  How would you like to obtain your AVS? Mail a copy  Originating Location (pt. Location): Home  Distant Location (provider location):  Off-site    Assessment & Plan     Major depressive disorder, recurrent episode, mild (H)  Unstable, stopped Wellbutrin in May, since then has worsened.   Increased stress with teenagers dealing with break ups and work stress. Not managing the way she feels she should. Crying in her car after and before work.   Recommend restart Wellbutrin, discussed this with her.  Continue Celexa  Offered counseling referral, declined  She also would like to reduce her work hours as this increasing her mental health and stress. I advised her that she can follow up in clinic to discuss this as I am just meeting her for the first time. She wanted to follow up with her PCP so I did send her a message as well.   - buPROPion (WELLBUTRIN XL) 150 MG 24 hr tablet; Take 1 tablet (150 mg) by mouth every morning.            See Patient Instructions    Subjective   Genesis is a 59 year old, presenting for the following health issues:  Depression      10/21/2024     8:05 AM   Additional Questions   Roomed by gume   Accompanied by self     History of Present Illness       Mental Health Follow-up:  Patient presents to follow-up on Depression.Patient's depression since last visit has been:  Worse  The patient is having other symptoms associated with depression.      Any significant life events: job concerns and other  Patient is feeling anxious or having panic attacks.  Patient has no concerns about alcohol or drug use.          5/14/2024     6:48 AM 9/10/2024     8:44 AM 10/21/2024     8:05 AM   PHQ   PHQ-9 Total Score 5 0 5   Q9: Thoughts of better off dead/self-harm past 2 weeks Not at all Not at all Not at all      Went off the Wellbutrin back in May with her PCP  and does not think she should have. Has kids that are going through a lot. Feeling more anxious and depressed. Working more and crying.       Review of Systems  Constitutional, HEENT, cardiovascular, pulmonary, gi and gu systems are negative, except as otherwise noted.      Objective           Vitals:  No vitals were obtained today due to virtual visit.    Physical Exam   General: Alert and no distress //Respiratory: No audible wheeze, cough, or shortness of breath // Psychiatric:  Appropriate affect, tone, and pace of words      No results found for any visits on 10/21/24.      Phone call duration: 6 minutes  Signed Electronically by: AMADOU Toledo CNP

## 2024-12-02 ENCOUNTER — MYC MEDICAL ADVICE (OUTPATIENT)
Dept: FAMILY MEDICINE | Facility: CLINIC | Age: 59
End: 2024-12-02
Payer: COMMERCIAL

## 2024-12-02 NOTE — TELEPHONE ENCOUNTER
Patient Quality Outreach    Patient is due for the following:   Depression  -  PHQ-9 needed  Physical Preventive Adult Physical-lipids and bmp      Topic Date Due    Flu Vaccine (1) 09/01/2024    COVID-19 Vaccine (3 - 2024-25 season) 09/01/2024       Action(s) Taken:   Schedule a Adult Preventative  Patient was assigned appropriate questionnaire to complete    Type of outreach:    Sent [employer] message.  Call in 1 week if not read/completed; send letter in 2 weeks if not returned/read.     Questions for provider review:    None           Patricia Duran CMA  Chart routed to Care Team.

## 2024-12-09 NOTE — TELEPHONE ENCOUNTER
Scheduled for preventative visit.    Future Appointments 12/9/2024 - 6/7/2025        Date Visit Type Length Department Provider     1/6/2025  9:00 AM OFFICE VISIT 30 min [facility] Joana Taylor, AMADOU CNP    Location Instructions:     [facility] is located at [address] [facility], one mile north of the [address] [facility] exit off of [place] [address]. From [address] [facility], exit to turn west on [address] [place] [address], then turn south on [address].                        Mckenzie Turner MA

## 2024-12-21 ENCOUNTER — HEALTH MAINTENANCE LETTER (OUTPATIENT)
Age: 59
End: 2024-12-21

## 2025-01-06 ENCOUNTER — TELEPHONE (OUTPATIENT)
Dept: FAMILY MEDICINE | Facility: CLINIC | Age: 60
End: 2025-01-06

## 2025-01-06 ENCOUNTER — OFFICE VISIT (OUTPATIENT)
Dept: FAMILY MEDICINE | Facility: CLINIC | Age: 60
End: 2025-01-06
Payer: COMMERCIAL

## 2025-01-06 VITALS
BODY MASS INDEX: 41.09 KG/M2 | SYSTOLIC BLOOD PRESSURE: 126 MMHG | TEMPERATURE: 98.3 F | RESPIRATION RATE: 16 BRPM | DIASTOLIC BLOOD PRESSURE: 78 MMHG | WEIGHT: 246.6 LBS | HEIGHT: 65 IN | OXYGEN SATURATION: 96 % | HEART RATE: 75 BPM

## 2025-01-06 DIAGNOSIS — Z00.00 ROUTINE GENERAL MEDICAL EXAMINATION AT A HEALTH CARE FACILITY: Primary | ICD-10-CM

## 2025-01-06 DIAGNOSIS — I10 PRIMARY HYPERTENSION: ICD-10-CM

## 2025-01-06 DIAGNOSIS — R73.03 PREDIABETES: ICD-10-CM

## 2025-01-06 DIAGNOSIS — Z87.898 HISTORY OF MOTION SICKNESS: ICD-10-CM

## 2025-01-06 DIAGNOSIS — Z87.891 PERSONAL HISTORY OF TOBACCO USE: ICD-10-CM

## 2025-01-06 DIAGNOSIS — E66.01 MORBID OBESITY (H): ICD-10-CM

## 2025-01-06 DIAGNOSIS — F33.0 MAJOR DEPRESSIVE DISORDER, RECURRENT EPISODE, MILD: ICD-10-CM

## 2025-01-06 DIAGNOSIS — Z12.31 ENCOUNTER FOR SCREENING MAMMOGRAM FOR BREAST CANCER: ICD-10-CM

## 2025-01-06 DIAGNOSIS — M85.89 OSTEOPENIA OF MULTIPLE SITES: ICD-10-CM

## 2025-01-06 LAB
ALBUMIN SERPL BCG-MCNC: 4.3 G/DL (ref 3.5–5.2)
ALP SERPL-CCNC: 93 U/L (ref 40–150)
ALT SERPL W P-5'-P-CCNC: 36 U/L (ref 0–50)
ANION GAP SERPL CALCULATED.3IONS-SCNC: 10 MMOL/L (ref 7–15)
AST SERPL W P-5'-P-CCNC: 32 U/L (ref 0–45)
BASOPHILS # BLD AUTO: 0 10E3/UL (ref 0–0.2)
BASOPHILS NFR BLD AUTO: 0 %
BILIRUB SERPL-MCNC: 0.2 MG/DL
BUN SERPL-MCNC: 10.7 MG/DL (ref 8–23)
CALCIUM SERPL-MCNC: 9.3 MG/DL (ref 8.8–10.4)
CHLORIDE SERPL-SCNC: 104 MMOL/L (ref 98–107)
CHOLEST SERPL-MCNC: 255 MG/DL
CREAT SERPL-MCNC: 0.71 MG/DL (ref 0.51–0.95)
EGFRCR SERPLBLD CKD-EPI 2021: >90 ML/MIN/1.73M2
EOSINOPHIL # BLD AUTO: 0.2 10E3/UL (ref 0–0.7)
EOSINOPHIL NFR BLD AUTO: 4 %
ERYTHROCYTE [DISTWIDTH] IN BLOOD BY AUTOMATED COUNT: 12.2 % (ref 10–15)
EST. AVERAGE GLUCOSE BLD GHB EST-MCNC: 111 MG/DL
FASTING STATUS PATIENT QL REPORTED: NO
FASTING STATUS PATIENT QL REPORTED: NO
GLUCOSE SERPL-MCNC: 105 MG/DL (ref 70–99)
HBA1C MFR BLD: 5.5 % (ref 0–5.6)
HCO3 SERPL-SCNC: 23 MMOL/L (ref 22–29)
HCT VFR BLD AUTO: 40.7 % (ref 35–47)
HDLC SERPL-MCNC: 101 MG/DL
HGB BLD-MCNC: 13.4 G/DL (ref 11.7–15.7)
IMM GRANULOCYTES # BLD: 0 10E3/UL
IMM GRANULOCYTES NFR BLD: 0 %
LDLC SERPL CALC-MCNC: 138 MG/DL
LYMPHOCYTES # BLD AUTO: 1.7 10E3/UL (ref 0.8–5.3)
LYMPHOCYTES NFR BLD AUTO: 34 %
MCH RBC QN AUTO: 29.8 PG (ref 26.5–33)
MCHC RBC AUTO-ENTMCNC: 32.9 G/DL (ref 31.5–36.5)
MCV RBC AUTO: 91 FL (ref 78–100)
MONOCYTES # BLD AUTO: 0.4 10E3/UL (ref 0–1.3)
MONOCYTES NFR BLD AUTO: 7 %
NEUTROPHILS # BLD AUTO: 2.7 10E3/UL (ref 1.6–8.3)
NEUTROPHILS NFR BLD AUTO: 54 %
NONHDLC SERPL-MCNC: 154 MG/DL
PLATELET # BLD AUTO: 163 10E3/UL (ref 150–450)
POTASSIUM SERPL-SCNC: 4.5 MMOL/L (ref 3.4–5.3)
PROT SERPL-MCNC: 7.2 G/DL (ref 6.4–8.3)
RBC # BLD AUTO: 4.49 10E6/UL (ref 3.8–5.2)
SODIUM SERPL-SCNC: 137 MMOL/L (ref 135–145)
TRIGL SERPL-MCNC: 81 MG/DL
TSH SERPL DL<=0.005 MIU/L-ACNC: 0.86 UIU/ML (ref 0.3–4.2)
WBC # BLD AUTO: 4.9 10E3/UL (ref 4–11)

## 2025-01-06 PROCEDURE — 80053 COMPREHEN METABOLIC PANEL: CPT | Performed by: NURSE PRACTITIONER

## 2025-01-06 PROCEDURE — G0296 VISIT TO DETERM LDCT ELIG: HCPCS | Performed by: NURSE PRACTITIONER

## 2025-01-06 PROCEDURE — 36415 COLL VENOUS BLD VENIPUNCTURE: CPT | Performed by: NURSE PRACTITIONER

## 2025-01-06 PROCEDURE — 83036 HEMOGLOBIN GLYCOSYLATED A1C: CPT | Performed by: NURSE PRACTITIONER

## 2025-01-06 PROCEDURE — 99396 PREV VISIT EST AGE 40-64: CPT | Performed by: NURSE PRACTITIONER

## 2025-01-06 PROCEDURE — 99214 OFFICE O/P EST MOD 30 MIN: CPT | Mod: 25 | Performed by: NURSE PRACTITIONER

## 2025-01-06 PROCEDURE — 85025 COMPLETE CBC W/AUTO DIFF WBC: CPT | Performed by: NURSE PRACTITIONER

## 2025-01-06 PROCEDURE — 84443 ASSAY THYROID STIM HORMONE: CPT | Performed by: NURSE PRACTITIONER

## 2025-01-06 PROCEDURE — 80061 LIPID PANEL: CPT | Performed by: NURSE PRACTITIONER

## 2025-01-06 PROCEDURE — G2211 COMPLEX E/M VISIT ADD ON: HCPCS | Performed by: NURSE PRACTITIONER

## 2025-01-06 RX ORDER — ONDANSETRON 4 MG/1
4 TABLET, FILM COATED ORAL EVERY 8 HOURS PRN
Qty: 10 TABLET | Refills: 0 | Status: SHIPPED | OUTPATIENT
Start: 2025-01-06

## 2025-01-06 RX ORDER — SCOPOLAMINE 1 MG/3D
1 PATCH, EXTENDED RELEASE TRANSDERMAL
Qty: 3 PATCH | Refills: 0 | Status: SHIPPED | OUTPATIENT
Start: 2025-01-06

## 2025-01-06 RX ORDER — BUPROPION HYDROCHLORIDE 150 MG/1
150 TABLET ORAL EVERY MORNING
Qty: 90 TABLET | Refills: 0 | Status: SHIPPED | OUTPATIENT
Start: 2025-01-06

## 2025-01-06 RX ORDER — CITALOPRAM HYDROBROMIDE 40 MG/1
40 TABLET ORAL DAILY
Qty: 90 TABLET | Refills: 1 | Status: SHIPPED | OUTPATIENT
Start: 2025-01-06

## 2025-01-06 RX ORDER — TIRZEPATIDE 2.5 MG/.5ML
2.5 INJECTION, SOLUTION SUBCUTANEOUS
Qty: 2 ML | Refills: 0 | Status: SHIPPED | OUTPATIENT
Start: 2025-01-06

## 2025-01-06 RX ORDER — SEMAGLUTIDE 0.68 MG/ML
0.5 INJECTION, SOLUTION SUBCUTANEOUS
Qty: 9 ML | Refills: 0 | Status: SHIPPED | OUTPATIENT
Start: 2025-01-06

## 2025-01-06 SDOH — HEALTH STABILITY: PHYSICAL HEALTH: ON AVERAGE, HOW MANY DAYS PER WEEK DO YOU ENGAGE IN MODERATE TO STRENUOUS EXERCISE (LIKE A BRISK WALK)?: 3 DAYS

## 2025-01-06 SDOH — HEALTH STABILITY: PHYSICAL HEALTH: ON AVERAGE, HOW MANY MINUTES DO YOU ENGAGE IN EXERCISE AT THIS LEVEL?: 20 MIN

## 2025-01-06 ASSESSMENT — PAIN SCALES - GENERAL: PAINLEVEL_OUTOF10: NO PAIN (0)

## 2025-01-06 ASSESSMENT — SOCIAL DETERMINANTS OF HEALTH (SDOH): HOW OFTEN DO YOU GET TOGETHER WITH FRIENDS OR RELATIVES?: TWICE A WEEK

## 2025-01-06 NOTE — PROGRESS NOTES
Lung Cancer Screening Shared Decision Making Visit     Genesis Adhikari, a 59 year old female, is eligible for lung cancer screening    History   Smoking Status     Former     Types: Cigarettes   Smokeless Tobacco     Never       I have discussed with patient the risks and benefits of screening for lung cancer with low-dose CT.     The risks include:    radiation exposure: one low dose chest CT has as much ionizing radiation as about 15 chest x-rays, or 6 months of background radiation living in [place]      false positives: most findings/nodules are NOT cancer, but some might still require additional diagnostic evaluation, including biopsy    over-diagnosis: some slow growing cancers that might never have been clinically significant will be detected and treated unnecessarily     The benefit of early detection of lung cancer is contingent upon adherence to annual screening or more frequent follow up if indicated.     Furthermore, to benefit from screening, Genesis must be willing and able to undergo diagnostic procedures, if indicated. Although no specific guide is available for determining severity of comorbidities, it is reasonable to withhold screening in patients who have greater mortality risk from other diseases.     We did discuss that the best way to prevent lung cancer is to not smoke.    Some patients may value a numeric estimation of lung cancer risk when evaluating if lung cancer screening is right for them, here is one calculator:    ShouldIScreen

## 2025-01-06 NOTE — RESULT ENCOUNTER NOTE
Genesis,  Your labs that have returned are normal. More labs are still processing.     Sincerely,  Joana Taylor DNP

## 2025-01-06 NOTE — PROGRESS NOTES
Preventive Care Visit  [facility] AMADOU Branch CNP, Family Medicine  Jan 6, 2025      Assessment & Plan     Routine general medical examination at a health care facility  Patient declined vaccines today.    Discussed options and rationale.  Ordered HCM testing per our discussion and patient decision based on USPSTF and Cancer screening guidelines.      Reinforced healthy habits with lifestyle, exercise and nutrition.    - Lipid panel reflex to direct LDL Fasting; Future  - Hemoglobin A1c; Future  - TSH with free T4 reflex; Future  - Comprehensive metabolic panel; Future  - CBC with Platelets & Differential; Future  - Lipid panel reflex to direct LDL Fasting  - Hemoglobin A1c  - TSH with free T4 reflex  - Comprehensive metabolic panel  - CBC with Platelets & Differential    Primary hypertension  HTN- Controlled, continue plan.  Updating medication refills and labs as necessary.  Continue healthy habits.  - OFFICE/OUTPT VISIT,EST,LEVL IV  - Comprehensive metabolic panel; Future  - Comprehensive metabolic panel    Morbid obesity (H)  Discussed weight loss to be gradual in nature, but consistent and not drastic. Really stressed importance of exercise to maintain muscle mass and overall health. In addition, advising healthy intake due to lack of appetite, and nutrition balance. Side effect warnings discussed.   See medication orders.  Trying to restart medication with coverage.  It appears Ozempic was denied due to not having diabetes.  Trial order for Zepbound.  Re-check again 2 months.   - OFFICE/OUTPT VISIT,EST,LEVL IV  - semaglutide (OZEMPIC, 0.25 OR 0.5 MG/DOSE,) 2 MG/3ML pen; Inject 0.5 mg subcutaneously every 7 days.  - ZEPBOUND 2.5 MG/0.5ML prefilled pen; Inject 0.5 mLs (2.5 mg) subcutaneously every 7 days.    Major depressive disorder, recurrent episode, mild (H)  -Stable, continue plan.  Medication refills provided as necessary.  Lab monitoring if needed/considered.  - OFFICE/OUTPT  "VISIT,EST,LEVL IV  - citalopram (CELEXA) 40 MG tablet; Take 1 tablet (40 mg) by mouth daily.  - buPROPion (WELLBUTRIN XL) 150 MG 24 hr tablet; Take 1 tablet (150 mg) by mouth every morning.    Osteopenia of multiple sites  Discussed calcium and vitamin D supplements and resistance bearing exercise.  - OFFICE/OUTPT VISIT,EST,LEVL IV    Personal history of tobacco use  As above see note.  - Prof fee: Shared Decision Making for Lung Cancer Screening  - CT Chest Lung Cancer Scrn Low Dose wo; Future    Encounter for screening mammogram for breast cancer    - MA Screen Bilateral w/Pasha; Future    Prediabetes  As above.  - semaglutide (OZEMPIC, 0.25 OR 0.5 MG/DOSE,) 2 MG/3ML pen; Inject 0.5 mg subcutaneously every 7 days.  - ZEPBOUND 2.5 MG/0.5ML prefilled pen; Inject 0.5 mLs (2.5 mg) subcutaneously every 7 days.    History of motion sickness  Discussed use, risk, side effects of medications.  - scopolamine (TRANSDERM) 1 MG/3DAYS 72 hr patch; Place 1 patch over 72 hours onto the skin every 72 hours.  - ondansetron (ZOFRAN) 4 MG tablet; Take 1 tablet (4 mg) by mouth every 8 hours as needed for nausea.    Patient has been advised of split billing requirements and indicates understanding: Yes        BMI  Estimated body mass index is 41.04 kg/m  as calculated from the following:    Height as of this encounter: 1.651 m (5' 5\").    Weight as of this encounter: 111.9 kg (246 lb 9.6 oz).   Weight management plan: Discussed healthy diet and exercise guidelines    Counseling  Appropriate preventive services were addressed with this patient via screening, questionnaire, or discussion as appropriate for fall prevention, nutrition, physical activity, Tobacco-use cessation, social engagement, weight loss and cognition.  Checklist reviewing preventive services available has been given to the patient.  Reviewed patient's diet, addressing concerns and/or questions.   She is at risk for lack of exercise and has been provided with " information to increase physical activity for the benefit of her well-being.   The patient was instructed to see the dentist every 6 months.   The patient reports drinking more than 3 alcoholic drinks per day and/or more than 7 drhnks per week. The patient was counseled and given information about possible harmful effects of excessive alcohol intake.    MEDICATIONS:        - Continue other medications without change    The longitudinal plan of care for the diagnosis(es)/condition(s) as documented were addressed during this visit. Due to the added complexity in care, I will continue to support Genesis in the subsequent management and with ongoing continuity of care.    Subjective   Genesis is a 59 year old, presenting for the following:  Physical        1/6/2025     8:59 AM   Additional Questions   Roomed by PJK   Accompanied by self          HPI  -Moderate life stressors.  Regained her weight as her GLP-1 injection was not covered.  Patient thinks Ozempic is covered through mail order so she would like this reordered.  It did, also curb her appetite for alcohol and she was able to cut back on this.  Patient has had a hard time exercising.  States weight has been going up and nutrition has not been helpful.    Upon further review of her tobacco use she is at a 20-year smoking history and would like lung cancer screening.  See separate note.    HTN- feels stable. Utilizing medications without issue. Due for labs. No concerning symptoms reported.     Mood-patient has stressors with her daughter overall she feels she is doing fairly well.    Osteopenia,  not consistent with taking calcium and vitamin D.    Patient has upcoming travel and would like a prescription for a cruise.            Health Care Directive  Patient does not have a Health Care Directive: Discussed advance care planning with patient; however, patient declined at this time.      1/6/2025   General Health   How would you rate your overall physical health?  (!) FAIR   Feel stress (tense, anxious, or unable to sleep) Not at all         1/6/2025   Nutrition   Three or more servings of calcium each day? Yes   Diet: I don't know   How many servings of fruit and vegetables per day? 4 or more   How many sweetened beverages each day? (!) 3         1/6/2025   Exercise   Days per week of moderate/strenous exercise 3 days   Average minutes spent exercising at this level 20 min         1/6/2025   Social Factors   Frequency of gathering with friends or relatives Twice a week   Worry food won't last until get money to buy more No   Food not last or not have enough money for food? No   Do you have housing? (Housing is defined as stable permanent housing and does not include staying ouside in a car, in a tent, in an abandoned building, in an overnight shelter, or couch-surfing.) Yes   Are you worried about losing your housing? No   Lack of transportation? No   Unable to get utilities (heat,electricity)? No         1/6/2025   Fall Risk   Fallen 2 or more times in the past year? No   Trouble with walking or balance? No          1/6/2025   Dental   Dentist two times every year? (!) NO         1/6/2025   TB Screening   Were you born outside of the [place]? No         Today's PHQ-2 Score:       1/6/2025     8:53 AM   PHQ-2 ( 1999 [employer])   Q1: Little interest or pleasure in doing things 0   Q2: Feeling down, depressed or hopeless 0   PHQ-2 Score 0    Q1: Little interest or pleasure in doing things Not at all   Q2: Feeling down, depressed or hopeless Not at all   PHQ-2 Score 0       Patient-reported           1/6/2025   Substance Use   Alcohol more than 3/day or more than 7/wk Yes   How often do you have a drink containing alcohol 4 or more times a week   How many alcohol drinks on typical day 3 or 4   How often do you have 5+ drinks at one occasion Monthly   Audit 2/3 Score 3   How often not able to stop drinking once started Less than monthly   How often failed to do what normally expected  Never   How often needed first drink in am after a heavy drinking session Never   How often feeling of guilt or remorse after drinking Never   How often unable to remember what happened the night before Never   Have you or someone else been injured because of your drinking No   Has anyone been concerned or suggested you cut down on drinking No   TOTAL SCORE - AUDIT 8   Do you use any other substances recreationally? No     Social History     Tobacco Use    Smoking status: Former     Current packs/day: 0.00     Average packs/day: 0.5 packs/day for 25.0 years (12.5 ttl pk-yrs)     Types: Cigarettes     Start date: 1993     Quit date: 2018     Years since quittin.9     Passive exposure: Never    Smokeless tobacco: Never    Tobacco comments:     quit 2018    Vaping Use    Vaping status: Never Used   Substance Use Topics    Alcohol use: Yes     Alcohol/week: 10.0 standard drinks of alcohol     Comment: 4-5 per week     Drug use: No           2024   LAST FHS-7 RESULTS   1st degree relative breast or ovarian cancer No   Any relative bilateral breast cancer No   Any male have breast cancer No   Any ONE woman have BOTH breast AND ovarian cancer No   Any woman with breast cancer before 50yrs Yes   2 or more relatives with breast AND/OR ovarian cancer Yes   2 or more relatives with breast AND/OR bowel cancer Yes        Mammogram Screening - Mammogram every 1-2 years updated in Health Maintenance based on mutual decision making        2025   STI Screening   New sexual partner(s) since last STI/HIV test? No     History of abnormal Pap smear: No - age 30- 64 PAP with HPV every 5 years recommended        Latest Ref Rng & Units 1/10/2022     3:43 PM 2016    10:00 AM 2016    12:00 AM   PAP / HPV   PAP  Negative for Intraepithelial Lesion or Malignancy (NILM)      PAP (Historical)    NIL    HPV 16 DNA Negative Negative  Negative     HPV 18 DNA Negative Negative  Negative     Other HR HPV  "Negative Negative  Negative       ASCVD Risk   The ASCVD Risk score (Noemi CHONG, et al., 2019) failed to calculate for the following reasons:    The valid HDL cholesterol range is 20 to 100 mg/dL           Reviewed and updated as needed this visit by Provider                    Lab work is in process      Review of Systems  Constitutional, HEENT, cardiovascular, pulmonary, GI, , musculoskeletal, neuro, skin, endocrine and psych systems are negative, except as otherwise noted.     Objective    Exam  /78   Pulse 75   Temp 98.3  F (36.8  C) (Temporal)   Resp 16   Ht 1.651 m (5' 5\")   Wt 111.9 kg (246 lb 9.6 oz)   LMP  (LMP Unknown)   SpO2 96%   BMI 41.04 kg/m     Estimated body mass index is 41.04 kg/m  as calculated from the following:    Height as of this encounter: 1.651 m (5' 5\").    Weight as of this encounter: 111.9 kg (246 lb 9.6 oz).    Physical Exam  GENERAL: alert and no distress  EYES: Eyes grossly normal to inspection, PERRL and conjunctivae and sclerae normal  HENT: ear canals and TM's normal, nose and mouth without ulcers or lesions  NECK: no adenopathy, no asymmetry, masses, or scars  RESP: lungs clear to auscultation - no rales, rhonchi or wheezes  CV: regular rate and rhythm, normal S1 S2, no S3 or S4, no murmur, click or rub, no peripheral edema  ABDOMEN: soft, nontender, no hepatosplenomegaly, no masses and bowel sounds normal  MS: no gross musculoskeletal defects noted, no edema  SKIN: no suspicious lesions or rashes  NEURO: Normal strength and tone, mentation intact and speech normal  PSYCH: mentation appears normal, affect normal/bright        Signed Electronically by: AMADOU Koenig CNP    "

## 2025-01-06 NOTE — TELEPHONE ENCOUNTER
[facility] Prior Authorization Team  Phone: [phone]    PRIOR AUTHORIZATION DENIED    Medication: OZEMPIC (0.25 OR 0.5 MG/DOSE) [facility]  Insurance Company: [employer] - Phone [phone] Fax [phone]  Denial Date: 1/6/2025  Denial Reason(s):         Appeal Information:         Patient Notified: NO- Unfortunately, we cannot call the patient with denials because we do not know what next steps the MD will take nor can we give medical advice, please notify the patient of what they are to expect for the continuation of their therapy from the provider.

## 2025-01-06 NOTE — RESULT ENCOUNTER NOTE
Genesis,   Cholesterol is elevated just a bit from a year ago.    However your healthy cholesterol level is quite high.  At this point I do not recommend a cholesterol agent, but want you to continue with your exercise and healthier eating as we talked about.  Other labs are in good range.    It does not look like your insurance is going to cover the Ozempic as you are not diagnosed with diabetes.  I am going to try to put over another medication just for weight management to see if they will cover that.      Sincerely,   Joana Taylor, DNP

## 2025-01-06 NOTE — PATIENT INSTRUCTIONS
Patient Education   Preventive Care Advice   This is general advice given by our system to help you stay healthy. However, your care team may have specific advice just for you. Please talk to your care team about your preventive care needs.  Nutrition  Eat 5 or more servings of fruits and vegetables each day.  Try wheat bread, brown rice and whole grain pasta (instead of white bread, rice, and pasta).  Get enough calcium and vitamin D. Check the label on foods and aim for 100% of the RDA (recommended daily allowance).  Lifestyle  Exercise at least 150 minutes each week  (30 minutes a day, 5 days a week).  Do muscle strengthening activities 2 days a week. These help control your weight and prevent disease.  No smoking.  Wear sunscreen to prevent skin cancer.  Have a dental exam and cleaning every 6 months.  Yearly exams  See your health care team every year to talk about:  Any changes in your health.  Any medicines your care team has prescribed.  Preventive care, family planning, and ways to prevent chronic diseases.  Shots (vaccines)   HPV shots (up to age 26), if you've never had them before.  Hepatitis B shots (up to age 59), if you've never had them before.  COVID-19 shot: Get this shot when it's due.  Flu shot: Get a flu shot every year.  Tetanus shot: Get a tetanus shot every 10 years.  Pneumococcal, hepatitis A, and RSV shots: Ask your care team if you need these based on your risk.  Shingles shot (for age 50 and up)  General health tests  Diabetes screening:  Starting at age 35, Get screened for diabetes at least every 3 years.  If you are younger than age 35, ask your care team if you should be screened for diabetes.  Cholesterol test: At age 39, start having a cholesterol test every 5 years, or more often if advised.  Bone density scan (DEXA): At age 50, ask your care team if you should have this scan for osteoporosis (brittle bones).  Hepatitis C: Get tested at least once in your life.  STIs (sexually  transmitted infections)  Before age 24: Ask your care team if you should be screened for STIs.  After age 24: Get screened for STIs if you're at risk. You are at risk for STIs (including HIV) if:  You are sexually active with more than one person.  You don't use condoms every time.  You or a partner was diagnosed with a sexually transmitted infection.  If you are at risk for HIV, ask about PrEP medicine to prevent HIV.  Get tested for HIV at least once in your life, whether you are at risk for HIV or not.  Cancer screening tests  Cervical cancer screening: If you have a cervix, begin getting regular cervical cancer screening tests starting at age 21.  Breast cancer scan (mammogram): If you've ever had breasts, begin having regular mammograms starting at age 40. This is a scan to check for breast cancer.  Colon cancer screening: It is important to start screening for colon cancer at age 45.  Have a colonoscopy test every 10 years (or more often if you're at risk) Or, ask your provider about stool tests like a FIT test every year or Cologuard test every 3 years.  To learn more about your testing options, visit:   .  For help making a decision, visit:   https://bit.ly/ak08819.  Prostate cancer screening test: If you have a prostate, ask your care team if a prostate cancer screening test (PSA) at age 55 is right for you.  Lung cancer screening: If you are a current or former smoker ages 50 to 80, ask your care team if ongoing lung cancer screenings are right for you.  For informational purposes only. Not to replace the advice of your health care provider. Copyright   2023 [employer]. All rights reserved. Clinically reviewed by the [facility]. Bit9 [id number] - REV 01/24.  Your Health Risk Assessment indicates you feel you are not in good health    A healthy lifestyle helps keep the body fit and the mind alert. It helps protect you from disease, helps you fight disease, and  helps prevent chronic disease (disease that doesn't go away) from getting worse. This is important as you get older and begin to notice twinges in muscles and joints and a decline in the strength and stamina you once took for granted. A healthy lifestyle includes good healthcare, good nutrition, weight control, recreation, and regular exercise. Avoid harmful substances and do what you can to keep safe. Another part of a healthy lifestyle is stay mentally active and socially involved.    Good healthcare   Have a wellness visit every year.   If you have new symptoms, let us know right away. Don't wait until the next checkup.   Take medicines exactly as prescribed and keep your medicines in a safe place. Tell us if your medicine causes problems.   Healthy diet and weight control   Eat 3 or 4 small, nutritious, low-fat, high-fiber meals a day. Include a variety of fruits, vegetables, and whole-grain foods.   Make sure you get enough calcium in your diet. Calcium, vitamin D, and exercise help prevent osteoporosis (bone thinning).   If you live alone, try eating with others when you can. That way you get a good meal and have company while you eat it.   Try to keep a healthy weight. If you eat more calories than your body uses for energy, it will be stored as fat and you will gain weight.     Recreation   Recreation is not limited to sports and team events. It includes any activity that provides relaxation, interest, enjoyment, and exercise. Recreation provides an outlet for physical, mental, and social energy. It can give a sense of worth and achievement. It can help you stay healthy.    Mental Exercise and Social Involvement  Mental and emotional health is as important as physical health. Keep in touch with friends and family. Stay as active as possible. Continue to learn and challenge yourself.   Things you can do to stay mentally active are:  Learn something new, like a foreign language or musical instrument.   Play  "SCRABBLE or do crossword puzzles. If you cannot find people to play these games with you at home, you can play them with others on your computer through the Internet.   Join a games club--anything from card games to chess or checkers or lawn bowling.   Start a new hobby.   Go back to school.   Volunteer.   Read.   Keep up with world events.  9 Ways to Cut Back on Drinking  Maybe you've found yourself drinking more alcohol than you'd prefer. If you want to cut back, here are some ideas to try.    Think before you drink.  Do you really want a drink, or is it just a habit? If you're used to having a drink at a certain time, try doing something else then.     Look for substitutes.  Find some no-alcohol drinks that you enjoy, like flavored seltzer water, tea with honey, or tonic with a slice of lime. Or try alcohol-free beer or \"virgin\" cocktails (without the alcohol).     Drink more water.  Use water to quench your thirst. Drink a glass of water before you have any alcohol. Have another glass along with every drink or between drinks.     Shrink your drink.  For example, have a bottle of beer instead of a pint. Use a smaller glass for wine. Choose drinks with lower alcohol content (ABV%). Or use less liquor and more mixer in cocktails.     Slow down.  It's easy to drink quickly and without thinking about it. Pay attention, and make each drink last longer.     Do the math.  Total up how much you spend on alcohol each month. How much is that a year? If you cut back, what could you do with the money you save?     Take a break.  Choose a day or two each week when you won't drink at all. Notice how you feel on those days, physically and emotionally. How did you sleep? Do you feel better? Over time, add more break days.     Count calories.  Would you like to lose some weight? For some people that's a good motivator for cutting back. Figure out how many calories are in each drink. How many does that add up to in a day? In a " "week? In a month?     Practice saying no.  Be ready when someone offers you a drink. Try: \"Thanks, I've had enough.\" Or \"Thanks, but I'm cutting back.\" Or \"No, thanks. I feel better when I drink less.\"   Current as of: November 15, 2023  Content Version: 14.3    2024 [employer].   Care instructions adapted under license by your healthcare professional. If you have questions about a medical condition or this instruction, always ask your healthcare professional. [employer] disclaims any warranty or liability for your use of this information.     Lung Cancer Screening   Frequently Asked Questions  If you are at high-risk for lung cancer, getting screened with low-dose computed tomography (LDCT) every year can help save your life. This handout offers answers to some of the most common questions about lung cancer screening. If you have other questions, please call [phone] ([phone]).     What is it?  Lung cancer screening uses special X-ray technology to create an image of your lung tissue. The exam is quick and easy and takes less than 10 seconds. We don t give you any medicine or use any needles. You can eat before and after the exam. You don t need to change your clothes as long as the clothing on your chest doesn t contain metal. But, you do need to be able to hold your breath for at least 6 seconds during the exam.    What is the goal of lung cancer screening?  The goal of lung cancer screening is to save lives. Many times, lung cancer is not found until a person starts having physical symptoms. Lung cancer screening can help detect lung cancer in the earliest stages when it may be easier to treat.    Who should be screened for lung cancer?  We suggest lung cancer screening for anyone who is at high-risk for lung cancer. You are in the high-risk group if you:      are between the ages of 55 and 79, and    have smoked at least 1 pack of cigarettes a day for 20 or more years, " and    still smoke or have quit within the past 15 years.    However, if you have a new cough or shortness of breath, you should talk to your doctor before being screened.    Why does it matter if I have symptoms?  Certain symptoms can be a sign that you have a condition in your lungs that should be checked and treated by your doctor. These symptoms include fever, chest pain, a new or changing cough, shortness of breath that you have never felt before, coughing up blood or unexplained weight loss. Having any of these symptoms can greatly affect the results of lung cancer screening.       Should all smokers get an LDCT lung cancer screening exam?  It depends. Lung cancer screening is for a very specific group of men and women who have a history of heavy smoking over a long period of time (see  Who should be screened for lung cancer  above).  I am in the high-risk group, but have been diagnosed with cancer in the past. Is LDCT lung cancer screening right for me?  In some cases, you should not have LDCT lung screening, such as when your doctor is already following your cancer with CT scan studies. Your doctor will help you decide if LDCT lung screening is right for you.  Do I need to have a screening exam every year?  Yes. If you are in the high-risk group described earlier, you should get an LDCT lung cancer screening exam every year until you are 79, or are no longer willing or able to undergo screening and possible procedures to diagnose and treat lung cancer.  How effective is LDCT at preventing death from lung cancer?  Studies have shown that LDCT lung cancer screening can lower the risk of death from lung cancer by 20 percent in people who are at high-risk.  What are the risks?  There are some risks and limitations of LDCT lung cancer screening. We want to make sure you understand the risks and benefits, so please let us know if you have any questions. Your doctor may want to talk with you more about these  risks.    Radiation exposure: As with any exam that uses radiation, there is a very small increased risk of cancer. The amount of radiation in LDCT is small--about the same amount a person would get from a mammogram. Your doctor orders the exam when he or she feels the potential benefits outweigh the risks.    False negatives: No test is perfect, including LDCT. It is possible that you may have a medical condition, including lung cancer, that is not found during your exam. This is called a false negative result.    False positives and more testing: LDCT very often finds something in the lung that could be cancer, but in fact is not. This is called a false positive result. False positive tests often cause anxiety. To make sure these findings are not cancer, you may need to have more tests. These tests will be done only if you give us permission. Sometimes patients need a treatment that can have side effects, such as a biopsy. For more information on false positives, see  What can I expect from the results?     Findings not related to lung cancer: Your LDCT exam also takes pictures of areas of your body next to your lungs. In a very small number of cases, the CT scan will show an abnormal finding in one of these areas, such as your kidneys, adrenal glands, liver or thyroid. This finding may not be serious, but you may need more tests. Your doctor can help you decide what other tests you may need, if any.  What can I expect from the results?  About 1 out of 4 LDCT exams will find something that may need more tests. Most of the time, these findings are lung nodules. Lung nodules are very small collections of tissue in the lung. These nodules are very common, and the vast majority--more than 97 percent--are not cancer (benign). Most are normal lymph nodes or small areas of scarring from past infections.  But, if a small lung nodule is found to be cancer, the cancer can be cured more than 90 percent of the time. To know  if the nodule is cancer, we may need to get more images before your next yearly screening exam. If the nodule has suspicious features (for example, it is large, has an odd shape or grows over time), we will refer you to a specialist for further testing.  Will my doctor also get the results?  Yes. Your doctor will get a copy of your results.  Is it okay to keep smoking now that there s a cancer screening exam?  No. Tobacco is one of the strongest cancer-causing agents. It causes not only lung cancer, but other cancers and cardiovascular (heart) diseases as well. The damage caused by smoking builds over time. This means that the longer you smoke, the higher your risk of disease. While it is never too late to quit, the sooner you quit, the better.  Where can I find help to quit smoking?  The best way to prevent lung cancer is to stop smoking. If you have already quit smoking, congratulations and keep it up! For help on quitting smoking, please call [facility] at 3-526-QUITNOW (1-244.111.5969) or the American Cancer Society at 1-191.715.4736 to find local resources near you.  One-on-one health coaching:  If you d prefer to work individually with a health care provider on tobacco cessation, we offer:      Medication Therapy Management:  Our specially trained pharmacists work closely with you and your doctor to help you quit smoking.  Call [phone] or [phone] (toll free).

## 2025-01-09 RX ORDER — TIRZEPATIDE 2.5 MG/.5ML
2.5 INJECTION, SOLUTION SUBCUTANEOUS
Qty: 2 ML | Refills: 0 | OUTPATIENT
Start: 2025-01-09

## 2025-01-09 NOTE — TELEPHONE ENCOUNTER
Spoke with pt. She said that Zepbound was approved originally with a $400 copay but she is wondering if maybe it would be cheaper through [facility].     Would like an [facility] to check the price. If not cheaper then look at other medications.

## 2025-01-13 ENCOUNTER — TELEPHONE (OUTPATIENT)
Dept: FAMILY MEDICINE | Facility: CLINIC | Age: 60
End: 2025-01-13
Payer: COMMERCIAL

## 2025-01-13 DIAGNOSIS — R73.03 PREDIABETES: ICD-10-CM

## 2025-01-13 DIAGNOSIS — E66.01 MORBID OBESITY (H): ICD-10-CM

## 2025-01-13 RX ORDER — TIRZEPATIDE 2.5 MG/.5ML
2.5 INJECTION, SOLUTION SUBCUTANEOUS
Qty: 2 ML | Refills: 0 | Status: SHIPPED | OUTPATIENT
Start: 2025-01-13

## 2025-01-13 NOTE — TELEPHONE ENCOUNTER
RN called patient and relayed message as below. Gave patient number for [facility] [phone].    Patient verbalized understanding and all questions answered.     Camilla Dozier RN  [facility] - Registered Nurse  Clinic Triage [place]   January 13, 2025

## 2025-01-13 NOTE — TELEPHONE ENCOUNTER
Rx for Zepbound needs to be resent to [facility].     Resent prescription.     Maryanne ARIZMENDIN, RN

## 2025-01-22 ENCOUNTER — TELEPHONE (OUTPATIENT)
Dept: FAMILY MEDICINE | Facility: CLINIC | Age: 60
End: 2025-01-22
Payer: COMMERCIAL

## 2025-01-22 DIAGNOSIS — R73.03 PREDIABETES: ICD-10-CM

## 2025-01-22 DIAGNOSIS — E66.01 MORBID OBESITY (H): ICD-10-CM

## 2025-01-22 NOTE — TELEPHONE ENCOUNTER
Patient calling in, went to  her Zepbound prescription and was told that as written, it will cost her $360 for a month's supply. Patient called insurance pharmacy and was told to get an Rx for 3 months because it will be cheaper.      Would like to do the low dose for 1 more month and then the increased dose for next 2 months. Patient verbalizes understanding of the need to follow up 1 month, even if prescription is sent for 3 months supply.     Requesting prescription be sent for 3-month supply.    Charlotte Hui, RN, BSN

## 2025-01-24 NOTE — TELEPHONE ENCOUNTER
Spoke with patient.  She is verifying that a script was sent.    Sahil Gentile, BSN, RN, PHN  [facility] ~ Registered Nurse  Clinic Triage ~ [place] [facility] [place]  February 15, 2022     Stable

## 2025-03-05 ENCOUNTER — TELEPHONE (OUTPATIENT)
Dept: FAMILY MEDICINE | Facility: CLINIC | Age: 60
End: 2025-03-05
Payer: COMMERCIAL

## 2025-03-05 DIAGNOSIS — R73.03 PREDIABETES: ICD-10-CM

## 2025-03-05 DIAGNOSIS — E66.01 MORBID OBESITY (H): ICD-10-CM

## 2025-03-05 NOTE — TELEPHONE ENCOUNTER
New Medication Request        What medication are you requesting?: Zepbourd medication prescribed by Joana Taylor CNP at [facility]     Would like medication today.    Replace Ozempic.    Patient has coupons for $25 for Zepbound.    Thank you.    Reason for medication request: Weight loss diagnosis    Have you taken this medication before?: No    Controlled Substance Agreement on file:   CSA -- Patient Level:    CSA: None found at the patient level.         Patient offered an appointment? No  patient and Joana has spoken about this recently.    Preferred Pharmacy:       [facility] [id number] [place] [facility] [address] [address] [place] [address]  [address] [address] [place] [address]  Phone: [phone] Fax: [phone]      Could we send this information to you in [employer] or would you prefer to receive a phone call?:   Patient would prefer a phone call   Okay to leave a detailed message?: Yes at Cell number on file:    Telephone Information:   Mobile [phone]

## 2025-03-06 RX ORDER — TIRZEPATIDE 5 MG/.5ML
5 INJECTION, SOLUTION SUBCUTANEOUS
Qty: 6 ML | Refills: 0 | Status: SHIPPED | OUTPATIENT
Start: 2025-03-06

## 2025-03-06 RX ORDER — TIRZEPATIDE 2.5 MG/.5ML
2.5 INJECTION, SOLUTION SUBCUTANEOUS
Qty: 2 ML | Refills: 0 | Status: SHIPPED | OUTPATIENT
Start: 2025-03-06

## 2025-03-06 NOTE — TELEPHONE ENCOUNTER
Pharmacy change    Patient wants [facility]     Camilla Dozier RN  [facility] - Registered Nurse  Clinic Triage [place]   March 6, 2025

## 2025-03-11 ENCOUNTER — MYC MEDICAL ADVICE (OUTPATIENT)
Dept: FAMILY MEDICINE | Facility: CLINIC | Age: 60
End: 2025-03-11
Payer: COMMERCIAL

## 2025-03-11 NOTE — LETTER
March 25, 2025      Genesis Adhikari  [address] [place] [address]              Dear Genesis,      We care about your health and well being so we have been reviewing your chart, we have uncovered that your most recent depression screening score needs to be updated.    Attached to this letter is the depression screening tool called the PHQ9.  Please fill this out at your earliest convenience so we can work with you to improve your overall health.     If you feel that you need to see us in person, please schedule a long/multi symptom appointment via mychart or by calling the clinic.       Thank you for this opportunity to continue to care for you and we look forward to hearing from you soon!     Your [facility] Care Team

## 2025-03-11 NOTE — TELEPHONE ENCOUNTER
Patient Quality Outreach    Patient is due for the following:   Depression  -  PHQ-9 needed    Action(s) Taken:   Patient was assigned appropriate questionnaire to complete    Type of outreach:    Sent [employer] message.  Call in 1 week if not read/completed; send letter in 2 weeks if not returned/read.     Questions for provider review:    None           Patricia Duran [facility]  Chart routed to Care Team.

## 2025-04-08 DIAGNOSIS — F33.0 MAJOR DEPRESSIVE DISORDER, RECURRENT EPISODE, MILD: ICD-10-CM

## 2025-04-08 RX ORDER — BUPROPION HYDROCHLORIDE 150 MG/1
150 TABLET ORAL EVERY MORNING
Qty: 90 TABLET | Refills: 0 | Status: SHIPPED | OUTPATIENT
Start: 2025-04-08

## 2025-04-30 ENCOUNTER — TELEPHONE (OUTPATIENT)
Dept: PULMONOLOGY | Facility: CLINIC | Age: 60
End: 2025-04-30

## 2025-04-30 DIAGNOSIS — R91.1 LUNG NODULE: Primary | ICD-10-CM

## 2025-05-01 NOTE — TELEPHONE ENCOUNTER
[facility] Radiology Lung Cancer Screening CT result notification:     LDCT/Lung Cancer Screening CT Exam date: 4/30/2025  Radiologist Impression AND Recommendations:   Lung-RADS Category 3. Probably benign finding(s)-  short term follow up suggested 6 month low dose CT Chest without  contrast (please order exam code [id number]).    Pertinent Findings:  Nodules:  The largest and/or fastest of these nodule(s) are as follows:     - 7  mm solid nodule in the left lower lobe on series:  4 image:  192.     - 5  mm solid nodule in the right lower lobe on series:  4 image:   177.     - 5  mm solid nodule in the right middle lobe on series:  4 image:   132.     - 4  mm solid nodule in the right middle lobe on series:  4 image:   131.     Ordering Provider: ANTOINETTE Aquino did not receive the remaining radiology results from their PCP.        Lung Nodule Program Protocol recommendation [Pertaining to lung nodules]:    Lung RADS 3 Protocol: Results RN to notify Patient of results/recommendations and place order for 6 month CT ([id number]) - MD pack  CT Chest order ([id number]) placed to be completed within 6 months (Yes/No):  LDCT due on or after 10/30/2025.      RN communicated the lung nodule finding to the patient (Yes/No):  Yes  The patient had the following questions: n/a  Correct letter sent as per [facility] Lung nodule protocol (Yes/No):  yes  Did Patient have any CT's of chest previous? (Yes/No/NA) n/a  If no comparisons used, inquire if patient has had any chest CT's in the past and if so, request priors.    Patricia Marcos, RN  RN Care Coordinator  Lung Nodule/Interventional Pulmonology Clinic  [facility]  Phone: [phone]

## 2025-05-28 ENCOUNTER — TELEPHONE (OUTPATIENT)
Dept: NURSING | Facility: CLINIC | Age: 60
End: 2025-05-28
Payer: COMMERCIAL

## 2025-05-28 NOTE — TELEPHONE ENCOUNTER
Patient is calling for CT results.    Patient informed on the results below.    Jax Hurtado,     I am one of the nurses that works with the lung cancer screening team. I wanted to let you know that your CT scan showed several small lung nodules.     The radiologist is recommending repeat another CT scan in 6 months to monitor those lung nodules.     The order is in for this and you can call at your convenience to schedule: [phone] option 2, then option 1.     Best,  Patricia Marcos RN, BSN  Nurse Care Coordinator  Interventional Pulmonology  Hi Genesis,  There are lung nodules noted- appearing benign at this time, mild fibrotic changes likely from smoking are noted, sometimes this can progress.  No evidence of lung cancer on your screening. Keep up with not smoking and annual testing.  Please reach out with questions you have.    Sincerely,  YE Koenig,   There are lung nodules noted- appearing benign at this time, mild fibrotic changes likely from smoking are noted, sometimes this can progress.  No evidence of lung cancer on your screening. Keep up with not smoking and annual testing.   Please reach out with questions you have.    Sincerely,   YE Koenig, RN, BSN  [facility]

## 2025-05-28 NOTE — TELEPHONE ENCOUNTER
Directed Genesis to call the Lung Nodule clinic at [phone].    Jesica Ruiz RN on 5/28/2025 at 10:04 AM

## 2025-05-30 ENCOUNTER — TELEPHONE (OUTPATIENT)
Dept: FAMILY MEDICINE | Facility: CLINIC | Age: 60
End: 2025-05-30
Payer: COMMERCIAL

## 2025-05-30 DIAGNOSIS — E66.01 MORBID OBESITY (H): Primary | ICD-10-CM

## 2025-05-30 NOTE — TELEPHONE ENCOUNTER
Patient Returning Call    Reason for call:  Patient requesting for an increase on her zepbound on her next refill due next week     Information relayed to patient:  message to care team     Patient has additional questions:  No      Could we send this information to you in [facility] or would you prefer to receive a phone call?:   Patient would prefer a phone call   Okay to leave a detailed message?: Yes at Cell number on file:    Telephone Information:   Mobile [phone]

## 2025-06-25 ENCOUNTER — TELEPHONE (OUTPATIENT)
Dept: FAMILY MEDICINE | Facility: CLINIC | Age: 60
End: 2025-06-25
Payer: COMMERCIAL

## 2025-06-25 DIAGNOSIS — E66.813 CLASS 3 SEVERE OBESITY DUE TO EXCESS CALORIES WITH SERIOUS COMORBIDITY IN ADULT, UNSPECIFIED BMI (H): Primary | ICD-10-CM

## 2025-06-25 NOTE — TELEPHONE ENCOUNTER
Needs to be seen for further refills, virtual OK. Joana Taylor, DNP   
Sent [employer] with scheduling ticket.    Patricia Duran CMA ([facility])    
tirzepatide-Weight Management (ZEPBOUND) 7.5 MG/0.5ML prefilled pen         Pharmacy comments: New RX Request  Your patient's drug is off formulary. You may consider prescribing a cevered alternative if appropriate or contact the plan to request and exception.    Alternative drug: Wegovy 0.25 MG/0.5 ML pen, Wegovy 0.5MG/0.5ML pen, Wegovy1 MG/ 0.5 pen, Wegovy 1.7 MG/ 0.75 ML pen, Wegovy 2.4 MG/ 0.75 ML pen    Response requested:    Please send a new prescription, if appropriate or  Fill As Is- fill original prescription as written. Do not change to alternative.  Denied- Do not fill requested prescription. Will consult with patient.  
ADL retraining/balance training/bed mobility training/transfer training

## 2025-07-01 ENCOUNTER — VIRTUAL VISIT (OUTPATIENT)
Dept: FAMILY MEDICINE | Facility: CLINIC | Age: 60
End: 2025-07-01
Payer: COMMERCIAL

## 2025-07-01 DIAGNOSIS — R93.89 ABNORMAL FINDING ON CT SCAN: ICD-10-CM

## 2025-07-01 DIAGNOSIS — E66.813 CLASS 3 SEVERE OBESITY DUE TO EXCESS CALORIES WITH SERIOUS COMORBIDITY IN ADULT, UNSPECIFIED BMI (H): Primary | ICD-10-CM

## 2025-07-01 PROCEDURE — 98006 SYNCH AUDIO-VIDEO EST MOD 30: CPT | Performed by: NURSE PRACTITIONER

## 2025-07-01 NOTE — PROGRESS NOTES
"      Genesis is a 59 year old who is being evaluated via a billable video visit.      How would you like to obtain your AVS? Mail a copy  If the video visit is dropped, the invitation should be resent by: Text to cell phone: [phone]  Will anyone else be joining your video visit? No        Assessment & Plan     ASSESSMENT/ORDERS:    ICD-10-CM    1. Class 3 severe obesity due to excess calories with serious comorbidity in adult, unspecified BMI (H)  E66.813 tirzepatide-Weight Management (ZEPBOUND) 10 MG/0.5ML prefilled pen      2. Abnormal finding on CT scan  R93.89           Assessment & Plan  Lung nodules with reticular pattern:  - Small lung nodules present, largest measuring 0.7 cm. Reticular pattern observed in lung tissue, requiring monitoring to rule out hardening of the lungs or infection.  - Follow-up appointment scheduled for October 29, 2025, to assess stability. If stable, return to annual screening.    Class 3 severe obesity due to excess calories with serious comorbidity in adult, unspecified BMI (H):  - Current weight management with Wegovy not effective; patient reports no reduction in appetite and minimal weight loss.  - Increase Zepbound dosage to 10 mg until September 2025, then switch to high-dose Wegovy. Patient to message in a month for potential dose adjustment to 12.5 mg. Consider insurance options for coverage changes.  - Exercise: Walking as part of exercise routine    The longitudinal plan of care for the diagnosis(es)/condition(s) as documented were addressed during this visit. Due to the added complexity in care, I will continue to support Genesis in the subsequent management and with ongoing continuity of care.             BMI  Estimated body mass index is 41.04 kg/m  as calculated from the following:    Height as of 1/6/25: 1.651 m (5' 5\").    Weight as of 1/6/25: 111.9 kg (246 lb 9.6 oz).   Weight management plan: Discussed healthy diet and exercise guidelines      Follow-up "       Subjective   Genesis is a 59 year old, presenting for the following health issues:  Weight Management and Results        7/1/2025     6:59 AM   Additional Questions   Roomed by VE     [facility]        -Imaging results    Lung cancer screening.         Medication Followup of Wegovy  Taking Medication as prescribed: yes  Side Effects:  no weight loss.   Medication Helping Symptoms:  NO  History of Present Illness-  Genesis Adhikari, 59 years    Lung Nodules  - Small nodules present in lung tissue  - Nodules are under a cm, largest measuring 0.7 cm  - Reticular pattern observed in lung tissue  - No recent illness reported    Breathing Issues  - Shortness of breath when walking, attributed to overweight  - No history of breathing problems outside of exercise    Weight Management  - History of using Wegovy and Zepbound for weight management  - Previous use of Ozempic with positive results  - Wegovy did not reduce appetite  - Lost 3 lbs but weight increased overall  - Walking as part of exercise routine  - Overweight contributing to shortness of breath    Social History and Life Stressors  - Family: Kids are generally good, but Lynsey needs a little help  - Insurance issues: Current insurance not covering certain medications starting September, considering switching insurance companies at the end of the year      Review of Systems  Constitutional, HEENT, cardiovascular, pulmonary, gi and gu systems are negative, except as otherwise noted.      Objective           Vitals:  No vitals were obtained today due to virtual visit.    Physical Exam   GENERAL: alert and no distress  EYES: Eyes grossly normal to inspection.  No discharge or erythema, or obvious scleral/conjunctival abnormalities.  RESP: No audible wheeze, cough, or visible cyanosis.    SKIN: Visible skin clear. No significant rash, abnormal pigmentation or lesions.  NEURO: Cranial nerves grossly intact.  Mentation and speech appropriate for age.  PSYCH: Appropriate  affect, tone, and pace of words          Video-Visit Details    Type of service:  Video Visit   Originating Location (pt. Location): Home    Distant Location (provider location):  Off-site  Platform used for Video Visit: Irvin  Signed Electronically by: AMADOU Koenig CNP

## 2025-07-29 ENCOUNTER — TELEPHONE (OUTPATIENT)
Dept: FAMILY MEDICINE | Facility: CLINIC | Age: 60
End: 2025-07-29

## 2025-07-30 ENCOUNTER — TELEPHONE (OUTPATIENT)
Dept: FAMILY MEDICINE | Facility: CLINIC | Age: 60
End: 2025-07-30
Payer: COMMERCIAL

## 2025-07-30 DIAGNOSIS — E66.813 CLASS 3 SEVERE OBESITY DUE TO EXCESS CALORIES WITH SERIOUS COMORBIDITY IN ADULT, UNSPECIFIED BMI (H): Primary | ICD-10-CM

## 2025-07-30 NOTE — TELEPHONE ENCOUNTER
tirzepatide-Weight Management (ZEPBOUND) 12.5 MG/0.5ML prefilled pen      Reason for Request:  Alternative requested: The prescribed medication is not covered by insurance.

## 2025-08-01 NOTE — TELEPHONE ENCOUNTER
[facility]ail Pharm[facility] Prior Authorization Team   Phone: [phone]      EPA APPROVAL -ONCE APPROVED, THE RX ORDER IS RELEASED TO THE FILLING PHARMACY. NO FURTHER FOLLOW UP IS PERFORMED.

## 2025-08-07 ENCOUNTER — NURSE TRIAGE (OUTPATIENT)
Dept: FAMILY MEDICINE | Facility: CLINIC | Age: 60
End: 2025-08-07
Payer: COMMERCIAL

## 2025-08-18 ENCOUNTER — NURSE TRIAGE (OUTPATIENT)
Dept: FAMILY MEDICINE | Facility: CLINIC | Age: 60
End: 2025-08-18

## 2025-08-18 ENCOUNTER — OFFICE VISIT (OUTPATIENT)
Dept: FAMILY MEDICINE | Facility: CLINIC | Age: 60
End: 2025-08-18
Payer: COMMERCIAL

## 2025-08-18 VITALS
DIASTOLIC BLOOD PRESSURE: 78 MMHG | OXYGEN SATURATION: 95 % | WEIGHT: 235 LBS | RESPIRATION RATE: 16 BRPM | SYSTOLIC BLOOD PRESSURE: 110 MMHG | HEART RATE: 70 BPM | TEMPERATURE: 97.2 F | BODY MASS INDEX: 39.11 KG/M2

## 2025-08-18 DIAGNOSIS — E66.813 CLASS 3 SEVERE OBESITY DUE TO EXCESS CALORIES WITH SERIOUS COMORBIDITY IN ADULT, UNSPECIFIED BMI (H): ICD-10-CM

## 2025-08-18 DIAGNOSIS — N30.90 BLADDER INFECTION: Primary | ICD-10-CM

## 2025-08-18 LAB
BACTERIA #/AREA URNS HPF: ABNORMAL /HPF
RBC #/AREA URNS AUTO: ABNORMAL /HPF
WBC #/AREA URNS AUTO: ABNORMAL /HPF

## 2025-08-18 PROCEDURE — 99213 OFFICE O/P EST LOW 20 MIN: CPT | Performed by: NURSE PRACTITIONER

## 2025-08-18 PROCEDURE — G2211 COMPLEX E/M VISIT ADD ON: HCPCS | Performed by: NURSE PRACTITIONER

## 2025-08-18 PROCEDURE — 81015 MICROSCOPIC EXAM OF URINE: CPT | Performed by: NURSE PRACTITIONER

## 2025-08-18 PROCEDURE — 87086 URINE CULTURE/COLONY COUNT: CPT | Performed by: NURSE PRACTITIONER

## 2025-08-18 RX ORDER — NITROFURANTOIN 25; 75 MG/1; MG/1
100 CAPSULE ORAL 2 TIMES DAILY
Qty: 10 CAPSULE | Refills: 0 | Status: SHIPPED | OUTPATIENT
Start: 2025-08-18 | End: 2025-08-23

## 2025-08-20 LAB — BACTERIA UR CULT: NORMAL

## 2025-08-27 DIAGNOSIS — E66.813 CLASS 3 SEVERE OBESITY DUE TO EXCESS CALORIES WITH SERIOUS COMORBIDITY IN ADULT, UNSPECIFIED BMI (H): ICD-10-CM

## 2025-08-27 RX ORDER — SEMAGLUTIDE 1.7 MG/.75ML
INJECTION, SOLUTION SUBCUTANEOUS
OUTPATIENT
Start: 2025-08-27

## (undated) DEVICE — PREP SKIN SCRUB TRAY 4461A

## (undated) DEVICE — SOL WATER IRRIG 1000ML BOTTLE 07139-09

## (undated) DEVICE — NDL 19GA 1.5"

## (undated) DEVICE — DRSG TELFA 3X8" 1238

## (undated) DEVICE — TUBING IRR ENDO HYSTEROSCOPIC 502-200-000A

## (undated) DEVICE — GLOVE PROTEXIS W/NEU-THERA 6.0  2D73TE60

## (undated) DEVICE — PACK MINOR SBA15MIFSE

## (undated) DEVICE — SOL NACL 0.9% INJ 1000ML BAG 07983-09

## (undated) DEVICE — PAD PERI W/WINGS 1580A

## (undated) DEVICE — CATH INTERMITTENT CLEAN-CATH 16FR 16" VINYL LF 421716

## (undated) DEVICE — DRAPE GYN/UROLOGY FLUID POUCH TUR 29455

## (undated) RX ORDER — FENTANYL CITRATE 50 UG/ML
INJECTION, SOLUTION INTRAMUSCULAR; INTRAVENOUS
Status: DISPENSED
Start: 2018-06-11

## (undated) RX ORDER — SIMETHICONE 40MG/0.6ML
SUSPENSION, DROPS(FINAL DOSAGE FORM)(ML) ORAL
Status: DISPENSED
Start: 2018-06-11